# Patient Record
Sex: MALE | Race: WHITE | NOT HISPANIC OR LATINO | Employment: OTHER | URBAN - METROPOLITAN AREA
[De-identification: names, ages, dates, MRNs, and addresses within clinical notes are randomized per-mention and may not be internally consistent; named-entity substitution may affect disease eponyms.]

---

## 2017-01-05 ENCOUNTER — OFFICE VISIT (OUTPATIENT)
Dept: FAMILY MEDICINE | Facility: CLINIC | Age: 82
End: 2017-01-05

## 2017-01-05 VITALS
SYSTOLIC BLOOD PRESSURE: 131 MMHG | BODY MASS INDEX: 29.36 KG/M2 | TEMPERATURE: 97.5 F | HEART RATE: 91 BPM | DIASTOLIC BLOOD PRESSURE: 70 MMHG | HEIGHT: 72 IN | WEIGHT: 216.8 LBS | OXYGEN SATURATION: 98 %

## 2017-01-05 DIAGNOSIS — R15.9 FECAL INCONTINENCE: Primary | ICD-10-CM

## 2017-01-05 DIAGNOSIS — Z13.9 SCREENING: ICD-10-CM

## 2017-01-05 DIAGNOSIS — I10 ESSENTIAL HYPERTENSION: ICD-10-CM

## 2017-01-05 DIAGNOSIS — E11.21 TYPE 2 DIABETES MELLITUS WITH DIABETIC NEPHROPATHY, WITHOUT LONG-TERM CURRENT USE OF INSULIN (H): ICD-10-CM

## 2017-01-05 DIAGNOSIS — E78.5 HYPERLIPIDEMIA LDL GOAL <100: ICD-10-CM

## 2017-01-05 DIAGNOSIS — R19.7 DIARRHEA, UNSPECIFIED TYPE: ICD-10-CM

## 2017-01-05 LAB
% GRANULOCYTES: 75.7 %G (ref 40–75)
ANION GAP SERPL CALCULATED.3IONS-SCNC: 11 MMOL/L (ref 5–18)
BUN SERPL-MCNC: 23 MG/DL (ref 8–28)
CALCIUM SERPL-MCNC: 9.6 MG/DL (ref 8.5–10.5)
CHLORIDE SERPL-SCNC: 109 MMOL/L (ref 98–107)
CHOLEST SERPL-MCNC: 122 MG/DL
CO2 SERPL-SCNC: 17 MMOL/L (ref 22–31)
CREAT SERPL-MCNC: 1.31 MG/DL (ref 0.7–1.3)
CREAT UR-MCNC: 192.5 MG/DL
FASTING?: ABNORMAL
GLUCOSE SERPL-MCNC: 267 MG/DL (ref 70–125)
GRANULOCYTES #: 5.7 K/UL (ref 1.6–8.3)
HBA1C MFR BLD: 11.6 % (ref 4.1–5.7)
HCT VFR BLD AUTO: 42.6 % (ref 40–53)
HDLC SERPL-MCNC: 33 MG/DL
HEMOGLOBIN: 13.4 G/DL (ref 13.3–17.7)
LDLC SERPL CALC-MCNC: 64 MG/DL
LYMPHOCYTES # BLD AUTO: 1.4 K/UL (ref 0.8–5.3)
LYMPHOCYTES NFR BLD AUTO: 19.1 %L (ref 20–48)
MCH RBC QN AUTO: 29.5 PG (ref 26.5–35)
MCHC RBC AUTO-ENTMCNC: 31.5 G/DL (ref 32–36)
MCV RBC AUTO: 93.8 FL (ref 78–100)
MICROALBUMIN UR-MCNC: 2.82 MG/DL (ref 0–1.99)
MICROALBUMIN/CREAT UR: 14.6 MG/G
MID #: 0.4 K/UL (ref 0–2.2)
MID %: 5.2 %M (ref 0–20)
PLATELET # BLD AUTO: 250 K/UL (ref 150–450)
POTASSIUM SERPL-SCNC: 5 MMOL/L (ref 3.5–5)
RBC # BLD AUTO: 4.54 M/UL (ref 4.4–5.9)
SODIUM SERPL-SCNC: 137 MMOL/L (ref 136–145)
TRIGL SERPL-MCNC: 125 MG/DL
WBC # BLD AUTO: 7.5 K/UL (ref 4–11)

## 2017-01-05 RX ORDER — DIPHENOXYLATE HCL/ATROPINE 2.5-.025MG
1 TABLET ORAL 4 TIMES DAILY PRN
Qty: 30 TABLET | Refills: 0 | Status: SHIPPED | OUTPATIENT
Start: 2017-01-05 | End: 2017-02-02

## 2017-01-05 NOTE — PROGRESS NOTES
HPI:       Elie Martinez is a 84 year old  male who presents for diarrhea for five days  Has had intermittent problems with incontinence. Some urgency, but has occasionally lost stool, se isolid, without sensation.  Consistent with autonomic neuropathy.  No recent fever, no other symptoms. Wife has recently been hospitalized for CVA, and patient has had some change in diet and some additional stress.    Has not been checked for diabetes since last February.   Pt thinks there has been little change in diabetes control.   No recent exposures to infectious etiologies known.   Loosing stool about three times per day.  Perhaps better in the last two days.   No vomiting. No GI upset. No change in medication. No change in metformin              PMHX:   Current Medications:   Current Outpatient Prescriptions   Medication Sig Dispense Refill     diphenoxylate-atropine (LOMOTIL) 2.5-0.025 MG per tablet Take 1 tablet by mouth 4 times daily as needed for diarrhea 30 tablet 0     glipiZIDE (GLUCOTROL) 5 MG tablet Take 1 tablet (5 mg) by mouth daily (with breakfast) 180 tablet 1     clotrimazole-betamethasone (LOTRISONE) cream Apply topically 2 times daily 45 g 2     ONE TOUCH LANCETS MISC Use to test blood sugars 3-4 times daily or as directed. (Please dispense LifeScan or preferred brand by insurance) 100 each 11     blood glucose monitoring (ONE TOUCH TEST STRIPS) test strip Use to test blood sugar 4 times daily or as directed. (Please dispense LifeScan or preferred brand by insurance) 1 Box 11     Blood Glucose Monitoring Suppl (ONE TOUCH BASIC SYSTEM) W/DEVICE KIT Use to test blood sugar 3-4 times daily or as directed. (Please dispense LifeScan or preferred brand by insurance) 1 kit 11     blood glucose monitoring (FREESTYLE INSULINX) test strip Use to test blood sugar 4 times daily or as directed. 1 Box 3     blood glucose monitoring (FREESTYLE FREEDOM LITE) meter device kit Use to test blood sugars 3-4 times daily  or as directed. 1 kit 0     blood glucose monitoring (FREESTYLE LITE) test strip Use to test blood sugars 3-4 times daily or as directed. 1 Box 11     metFORMIN (GLUCOPHAGE-XR) 500 MG 24 hr tablet Take 2 tablets (1,000 mg) by mouth 2 times daily (with meals) 360 tablet 3     atorvastatin (LIPITOR) 40 MG tablet Take 1 tablet (40 mg) by mouth daily 90 tablet 3     blood glucose monitoring (FREESTYLE INSULINX SYSTEM) meter device kit Use to test blood sugar 4 times daily or as directed. 1 kit 0     lisinopril (PRINIVIL,ZESTRIL) 10 MG tablet Take 1 tablet (10 mg) by mouth daily 90 tablet 3     aspirin 325 MG tablet Take 1 tablet (325 mg) by mouth daily 90 tablet 3       Existing Problems  Patient Active Problem List   Diagnosis     DM2 (diabetes mellitus, type 2) (H)     Cerebral vascular disease     S/P carotid endarterectomy     Hypertension     Prostate cancer (H)     Hyperlipidemia LDL goal <100     Chronic renal disease, stage II       Allergies:  No Known Allergies    Previous labs:  Lab Results   Component Value Date    HGB 13.2* 02/04/2016    HCT 42.1 02/04/2016    CHOL 135.0 12/21/2015    TRIG 181.0* 12/21/2015    HDL 38.0* 12/21/2015     04/13/2016    BUN 22 04/13/2016    CO2 26.0 03/16/2016    PSA 0.8 09/14/2016    PSA 0.8 09/14/2016               Review of Systems:    CONSTITUTIONAL: no fatigue, no unexpected change in weight  SKIN: no worrisome rashes, no worrisome moles, no worrisome lesions  EYES: no acute vision problems or changes  ENT: no ear problems, no mouth problems, no throat problems  RESP: no significant cough, no shortness of breath  CV: no chest pain, no palpitations, no new or worsening peripheral edema  GI: no nausea, no vomiting, no constipation, no diarrhea          Physical Exam:     Filed Vitals:    01/05/17 1015   BP: 131/70   Pulse: 91   Temp: 97.5  F (36.4  C)   TempSrc: Oral   Height: 6' (182.9 cm)   Weight: 216 lb 12.8 oz (98.34 kg)   SpO2: 98%     Body mass index is 29.4  kg/(m^2).    GENERAL:alert, well hydrated, no distress  EYES: Eyes grossly normal to inspection, extraocular movements - intact, and PERRL  HENT: ear canals- normal; TMs- normal; Nose- normal; Mouth- no ulcers, no lesions  NECK: no tenderness, no adenopathy, no asymmetry, no masses, no stiffness;  RESP: lungs clear to auscultation - no rales, no rhonchi, no wheezes  CV: regular rates and rhythm, normal S1 S2, no S3 or S4 and no murmur, no click or rub -  ABDOMEN: soft, no tenderness, no  hepatosplenomegaly, no masses, normal bowel sounds             Labs and Procedures     Office Visit on 09/14/2016   Component Date Value Ref Range Status     PSA 09/14/2016 0.8  0.0 - 6.5 ng/mL Final     PSA 09/14/2016 0.8  0.0 - 6.5 ng/mL Final              Assessment and Plan     1.Stool incontinence - consider infectious versus onset of autonomic neuropathy (diabetes), versus functional changes.  Will provide lomotil for short term control. Will also increase fiber to two teaspoons twice per day.  2. Stool sample for culture and OP.  3. A1c,CBC, BMP.   4. Hyperlipidemia - will recheck lipids at this time.   5. Recheck in one week.         Options for treatment and follow-up care were reviewed with the patient and/or guardian. Elie Martinez and/or guardian engaged in the decision making process and verbalized understanding of the options discussed and agreed with the final plan.    Jairo Hammer MD

## 2017-01-05 NOTE — PATIENT INSTRUCTIONS
Your medication list is printed, please keep this with you, it is helpful to bring this current list to any other medical appointments, the emergency room or hospital.    If you had lab testing today and your results are reassuring or normal they will be be mailed to you within 7 days.     If the lab tests need quick action we will call you with the results.   The phone number we will call with results is # [phone] (home) . If this is not the best number please call our clinic and change the number.    If you need any refills please call your pharmacy and they will contact us.    If you have any further concerns or wish to schedule another appointment you must call our office during normal business hours  [phone] (8-5:00 M-F)  If you have urgent medical questions that cannot wait  you may also call [phone] at any time of day.  If you have a medical emergency please call [phone].    Thank you for coming to [facility].

## 2017-01-05 NOTE — Clinical Note
January 11, 2017      Elie Martinez  [address]   [place] [facility] [address]        Dear Elie,    Your diabetes is out of control. Your doctor will talk with you more about this at your next visit.    Please see below for your test results.    Resulted Orders   Hemoglobin A1c ([facility])   Result Value Ref Range    Hemoglobin A1C 11.6 (H) 4.1 - 5.7 %   CBC with Diff Plt ([facility])   Result Value Ref Range    WBC 7.5 4.0 - 11.0 K/uL    Lymphocytes # 1.4 0.8 - 5.3 K/uL    % Lymphocytes 19.1 (L) 20.0 - 48.0 %L    Mid # 0.4 0.0 - 2.2 K/uL    Mid % 5.2 0.0 - 20.0 %M    GRANULOCYTES # 5.7 1.6 - 8.3 K/uL    % Granulocytes 75.7 (H) 40.0 - 75.0 %G    RBC 4.54 4.40 - 5.90 M/uL    Hemoglobin 13.4 13.3 - 17.7 g/dL    Hematocrit 42.6 40.0 - 53.0 %    MCV 93.8 78.0 - 100.0 fL    MCH 29.5 26.5 - 35.0 pg    MCHC 31.5 (L) 32.0 - 36.0 g/dL    Platelets 250.0 150.0 - 450.0 K/uL   Microalbumin Creatinine Ratio Random Ur ([facility])   Result Value Ref Range    Microalbumin, Urine 2.82 (H) 0.00 - 1.99 mg/dL    Creatinine, Urine 192.5 mg/dL    Albumin Urine mg/g Cr 14.6 <=19.9 mg/g    Narrative    Test performed by:  [facility] [address] [place] [address]  Microalbumin, Random Urine  <2.0 mg/dL . . . . . . . . Normal  3.0-30.0 mg/dL . . . . . . Microalbuminuria  >30.0 mg/dL . . . . . .  . Clinical Proteinuria  Microalbumin/Creatinine Ratio, Random Urine  <20 mg/g . . . . .. . . . Normal   mg/g . . . . . . . Microalbuminuria  >300 mg/g . . . . . . . . Clinical Proteinuria   Basic Metabolic Profile ([facility])   Result Value Ref Range    Sodium 137 136 - 145 mmol/L    Potassium 5.0 3.5 - 5.0 mmol/L    Chloride 109 (H) 98 - 107 mmol/L    CO2, Total 17 (L) 22 - 31 mmol/L    Anion Gap 11 5 - 18 mmol/L    Glucose 267 (H) 70 - 125 mg/dL    Calcium 9.6 8.5 - 10.5 mg/dL    Urea Nitrogen 23 8 - 28 mg/dL    Creatinine 1.31 (H) 0.70 - 1.30 mg/dL    GFR Estimate If Black >60 >60 mL/min/1.73m2    GFR Estimate 52 (L) >60  mL/min/1.73m2    Narrative    Test performed by:  [facility]  [address] [place] [address]  Fasting Glucose reference range is 70-99 mg/dL per  American Diabetes Association (ADA) guidelines.   Lipid Profile ([facility])   Result Value Ref Range    Triglycerides 125 <=149 mg/dL    Cholesterol 122 <=199 mg/dL    LDL Cholesterol Calculated 64 <=129 mg/dL    HDL Cholesterol 33 (L) >=40 mg/dL    Fasting? Unknown     Narrative    Test performed by:  [facility] [address] [place] [address]       If you have any questions, please call the clinic to make an appointment.    Sincerely,    Jairo Hammer MD

## 2017-01-06 DIAGNOSIS — R19.7 DIARRHEA, UNSPECIFIED TYPE: ICD-10-CM

## 2017-01-06 NOTE — Clinical Note
January 11, 2017      Elie Martinez  [address]   [place] [facility] [address]        Dear Elie,    No stool infections seen.    Please see below for your test results.    Resulted Orders   Ova And Parasite - Stool ([facility])   Result Value Ref Range    Ova and Parasite No ova or parasites seen No Ova or Parasites seen    Narrative    Test performed by:  [facility] [address] [place] [address]   Enterohaemorrhagic E. Coli ([facility])   Result Value Ref Range    Shiga Toxin 1 Negative Negative    Shiga Toxin 2 Negative Negative    Narrative    Test performed by:  [facility] [address] [place] [address]   Culture, Stool ([facility])   Result Value Ref Range    Culture SEE RESULTS BELOW       Comment:      CULTURE, STOOL   CULTURE RESULTS:    No Salmonella, Shigella, Yersinia, or Campylobacter.      Narrative    Test performed by:  [facility] [address] [place] [address]  All specimens submitted for routine culture tested for Salmonella, Shigella,   Yersinia, Campylobacter, and if applicable, Shiga toxin 1 and 2 producing   Enterohemorrhagic E. coli.       If you have any questions, please call the clinic to make an appointment.    Sincerely,    [facility]

## 2017-01-07 LAB
SHIGA TOXIN 1: NEGATIVE
SHIGA TOXIN 2: NEGATIVE

## 2017-01-09 LAB
CULTURE: NORMAL
O+P SPEC MICRO: NORMAL

## 2017-01-09 NOTE — PROGRESS NOTES
Quick Note:    Please call patient and send results letter.   Your diabetes is out of control. Your doctor will talk with you more about this at your next visit.  ______

## 2017-02-02 ENCOUNTER — OFFICE VISIT (OUTPATIENT)
Dept: FAMILY MEDICINE | Facility: CLINIC | Age: 82
End: 2017-02-02

## 2017-02-02 VITALS
BODY MASS INDEX: 29.56 KG/M2 | OXYGEN SATURATION: 98 % | HEART RATE: 92 BPM | TEMPERATURE: 97.3 F | WEIGHT: 218 LBS | SYSTOLIC BLOOD PRESSURE: 95 MMHG | DIASTOLIC BLOOD PRESSURE: 62 MMHG

## 2017-02-02 DIAGNOSIS — R19.7 DIARRHEA, UNSPECIFIED TYPE: ICD-10-CM

## 2017-02-02 DIAGNOSIS — I67.9 CEREBRAL VASCULAR DISEASE: ICD-10-CM

## 2017-02-02 DIAGNOSIS — E11.65 TYPE 2 DIABETES MELLITUS WITH HYPERGLYCEMIA, UNSPECIFIED LONG TERM INSULIN USE STATUS: ICD-10-CM

## 2017-02-02 DIAGNOSIS — E11.65 TYPE 2 DIABETES MELLITUS WITH HYPERGLYCEMIA, WITHOUT LONG-TERM CURRENT USE OF INSULIN (H): Primary | ICD-10-CM

## 2017-02-02 DIAGNOSIS — I10 BENIGN ESSENTIAL HYPERTENSION: ICD-10-CM

## 2017-02-02 RX ORDER — METFORMIN HCL 500 MG
500 TABLET, EXTENDED RELEASE 24 HR ORAL 2 TIMES DAILY WITH MEALS
Qty: 360 TABLET | Refills: 3
Start: 2017-02-02 | End: 2017-07-31

## 2017-02-02 RX ORDER — DIPHENOXYLATE HCL/ATROPINE 2.5-.025MG
1 TABLET ORAL 4 TIMES DAILY PRN
Qty: 30 TABLET | Refills: 0 | Status: SHIPPED | OUTPATIENT
Start: 2017-02-02 | End: 2017-02-16

## 2017-02-02 RX ORDER — GLIPIZIDE 5 MG/1
5 TABLET ORAL
Qty: 180 TABLET | Refills: 1 | Status: SHIPPED | OUTPATIENT
Start: 2017-02-02 | End: 2017-10-09

## 2017-02-02 NOTE — PROGRESS NOTES
"SUBJECTIVE:  This is an 84-year-old male with type 2 diabetes, cerebrovascular disease status post carotid endarterectomy, hypertension, stage III chronic kidney disease and prostate cancer status post treatment.  He presents today with a chief complaint of ongoing loose stools and intermittent stool incontinence.  He gets up in the morning, takes his morning medications of lisinopril, aspirin, metformin 1000 mg (extended release) and 5 mg of glipizide.  Between 10:00 a.m. and noon he will have a small amount of loose runny stools and he is sometimes incontinent of stool.  He has had a normal formed stool, usually shortly after breakfast.  He rarely will have loose runny stools or stool incontinence in the early afternoon.  For the remainder of the day he does not have any difficulty with loose stools or runny stool.  He has formed stool every morning.  He denies any abdominal pain, cramping, blood in stool or black stool.  This has bothered him for years.  He cannot recall it ever being better in the past.  When I look at my records it appears that he had resolution of his loose stools when he switched from shorter acting metformin to long-acting metformin.  He does not have blood glucose readings with him today.  He had an A1c done earlier this month was 11.3, up from 7.3 back in March.  He denies any changes in his medication but admits he occasionally misses his afternoon medication doses.  His wife has suffered a stroke about 2 months ago and has been hospitalized and in transitional care.  This has been hard for him to care for his own medications while caring for her.  They are moving today.  He uses a pillbox for his medications and feels at the end of the week his pillbox is always empty.  He was able to name the medication metformin and its appropriate dose and then knew he took \"four other medications,\" but did not know their names or other dose.   Next, as far as his hypertension he does not check his " blood pressure at home.  No dizziness, lightheadedness or orthostatic symptoms.   Next, as far as his wife's health they are moving, but I do not know the extent of her change in independent living.   REVIEW OF SYSTEMS:  Formed stools in the morning loose stools in late morning as outlined above.  No areas of pain.  The remainder of the review of systems as outlined above.   OBJECTIVE:   VITAL SIGNS:  As above with his blood pressure relatively low.   GENERAL:  He is alert, no distress, relates his own history, although not extensive detail, medication naming is outlined in the HPI.   HEENT:  Head is normocephalic and atraumatic.  Eyes, sclerae are nonicteric, noninjected.  Moist mucous membranes.  Tympanic membranes are white with normal bony and light landmarks.   NECK:  Supple without lymphadenopathy.   CARDIOVASCULAR:  Regular rate and rhythm without murmur.   LUNGS:  Clear with fair air exchange bilaterally.   ABDOMEN:  Soft and nontender.   ASSESSMENT AND PLAN:   1.  Chronic loose stool:  Reviewing my records it appears he had some improvement with switching from short-acting metformin long-acting metformin.  I will try reducing his extended release metformin dose from 1000 mg twice a day to 500 mg twice a day and ensure this happens with meals.  I have also recommended a home care nurse to do weekly medication monitoring.  My staff will help arrange a nursing evaluation and ongoing observation.  He did recently have stool cultures, stool studies which were negative and infectious etiology seems likely.  Without pain ischemic bowel seems unlikely.  He is not on other likely medications.  A neuropathy is a possibility, although his normal stooling and sensation every morning makes this less likely as well.   2.  Uncontrolled type 2 diabetes:  His A1c raised from 7.3-11.3 over the past 7 months.  Medication adherence is a possibility, particularly in light of some memory deficit.  I have increased his glipizide  to 5 mg twice daily with meals.  Her metformin reduction as outlined above.  He will see me in 3 weeks with a glucometer.  Home nursing assessment.   3.  Stage 3 chronic kidney disease:  Basic metabolic profile on  1/5 shows stable GFR in the 50 range,  repeat no later than 2017.   4.  Hypertension:  His blood pressure is relatively low today, I have reduced his lisinopril 5 mg each day.   Goal blood pressure of around 140/ 90 in light of his cerebrovascular disease.   5.  History of prostate cancer:  He last saw Urology in November.  They plan to follow up PSA last at 0.8.

## 2017-02-02 NOTE — MR AVS SNAPSHOT
After Visit Summary   2/2/2017    Elie Martinez    MRN: [id number]           Patient Information     Date Of Birth          9/8/1932        Visit Information        Provider Department      2/2/2017 1:00 PM Jhon Gutierrez MD [facility]        Today's Diagnoses     Type 2 diabetes mellitus with hyperglycemia, without long-term current use of insulin (H)    -  1     Type 2 diabetes mellitus with hyperglycemia, unspecified long term insulin use status (H)         Diarrhea, unspecified type           Care Instructions    - Reduce lisinopril from 10 mg (1 tablet) to 5 mg ( 0.5 tablet) daily for blood pressure.  - Reduce metformin to 1 tablet twice daily instead of 2 tablets twice daily.  - Increase glipizide to 1 tablet with breakfast and 1 tablet with your evening meal.  - Return to see Dr Gutierrez in 2-3 weeks with your glucometer.    Your medication list is printed, please keep this with you, it is helpful to bring this current list to any other medical appointments, the emergency room or hospital.    If you had lab testing today and your results are reassuring or normal they will be be mailed to you within 7 days.     If the lab tests need quick action we will call you with the results.   The phone number we will call with results is # [phone] (home) . If this is not the best number please call our clinic and change the number.    If you need any refills please call your pharmacy and they will contact us.    If you have any further concerns or wish to schedule another appointment you must call our office during normal business hours  [phone] (8-5:00 M-F)  If you have urgent medical questions that cannot wait  you may also call [phone] at any time of day.  If you have a medical emergency please call [phone].    Thank you for coming to [facility].          Follow-ups after your visit        Who to contact     Please call your clinic at [phone] to:    Ask questions about  your health    Make or cancel appointments    Discuss your medicines    Learn about your test results    Speak to your doctor   If you have compliments or concerns about an experience at your clinic, or if you wish to file a complaint, please contact [facility] Patient Relations at [phone] or email us at [email or web address]         Additional Information About Your Visit        OneMedNethart Information     TopPatcht is an electronic gateway that provides easy, online access to your medical records. With Whyteboard, you can request a clinic appointment, read your test results, renew a prescription or communicate with your care team.     To sign up for Whyteboard visit the website at [email or web address] ([email or web address]   You will be asked to enter the access code listed below, as well as some personal information. Please follow the directions to create your username and password.     Your access code is: [email or web address]  Expires: 5/3/2017  1:35 PM     Your access code will  in 90 days. If you need help or a new code, please contact your [facility] or call [phone] for assistance.        Care EveryWhere ID     This is your Care EveryWhere ID. This could be used by other organizations to access your [place] medical records  [id number]        Your Vitals Were     Pulse Temperature Pulse Oximetry             92 97.3  F (36.3  C) (Tympanic) 98%          Blood Pressure from Last 3 Encounters:   17 95/62   17 131/70   16 138/73    Weight from Last 3 Encounters:   17 218 lb (98.884 kg)   17 216 lb 12.8 oz (98.34 kg)   16 219 lb (99.338 kg)              Today, you had the following     No orders found for display         Today's Medication Changes          These changes are accurate as of: 17  1:35 PM.  If you have any questions, ask your nurse or doctor.               These medicines have changed or have updated  prescriptions.        Dose/Directions    glipiZIDE 5 MG tablet   Commonly known as:  GLUCOTROL   This may have changed:  when to take this   Used for:  Type 2 diabetes mellitus with hyperglycemia, unspecified long term insulin use status (H)   Changed by:  Jhon Gutierrez MD        Dose:  5 mg   Take 1 tablet (5 mg) by mouth 2 times daily (before meals)   Quantity:  180 tablet   Refills:  1       metFORMIN 500 MG 24 hr tablet   Commonly known as:  GLUCOPHAGE-XR   This may have changed:  how much to take   Used for:  Type 2 diabetes mellitus with hyperglycemia, without long-term current use of insulin (H)   Changed by:  Jhon Gutierrez MD        Dose:  500 mg   Take 1 tablet (500 mg) by mouth 2 times daily (with meals)   Quantity:  360 tablet   Refills:  3            Where to get your medicines      These medications were sent to [id number] [place], [place] - [address] [place] [address]     Phone:  [phone]    - glipiZIDE 5 MG tablet      Some of these will need a paper prescription and others can be bought over the counter.  Ask your nurse if you have questions.     Bring a paper prescription for each of these medications    - diphenoxylate-atropine 2.5-0.025 MG per tablet    You don't need a prescription for these medications    - metFORMIN 500 MG 24 hr tablet             Primary Care Provider Office Phone # Fax #    Jhon Gutierrez [phone]       [facility] [address] [place] [address]        Thank you!     Thank you for choosing [facility]  for your care. Our goal is always to provide you with excellent care. Hearing back from our patients is one way we can continue to improve our services. Please take a few minutes to complete the written survey that you may receive in the mail after your visit with us. Thank you!             Your Updated Medication List - Protect others around you: Learn how to safely use, store and throw away your  medicines at www.disposemymeds.org.          This list is accurate as of: 2/2/17  1:35 PM.  Always use your most recent med list.                   Brand Name Dispense Instructions for use    aspirin 325 MG tablet     90 tablet    Take 1 tablet (325 mg) by mouth daily       atorvastatin 40 MG tablet    LIPITOR    90 tablet    Take 1 tablet (40 mg) by mouth daily       * blood glucose monitoring meter device kit     1 kit    Use to test blood sugar 4 times daily or as directed.       * blood glucose monitoring meter device kit     1 kit    Use to test blood sugars 3-4 times daily or as directed.       * ONE TOUCH BASIC SYSTEM W/DEVICE Kit     1 kit    Use to test blood sugar 3-4 times daily or as directed. (Please dispense LifeScan or preferred brand by insurance)       * blood glucose monitoring test strip    FREESTYLE INSULINX    1 Box    Use to test blood sugar 4 times daily or as directed.       * blood glucose monitoring test strip    FREESTYLE LITE    1 Box    Use to test blood sugars 3-4 times daily or as directed.       * blood glucose monitoring test strip    ONE TOUCH TEST STRIPS    1 Box    Use to test blood sugar 4 times daily or as directed. (Please dispense LifeScan or preferred brand by insurance)       clotrimazole-betamethasone cream    LOTRISONE    45 g    Apply topically 2 times daily       diphenoxylate-atropine 2.5-0.025 MG per tablet    LOMOTIL    30 tablet    Take 1 tablet by mouth 4 times daily as needed for diarrhea       glipiZIDE 5 MG tablet    GLUCOTROL    180 tablet    Take 1 tablet (5 mg) by mouth 2 times daily (before meals)       lisinopril 10 MG tablet    PRINIVIL/ZESTRIL    90 tablet    Take 1 tablet (10 mg) by mouth daily       metFORMIN 500 MG 24 hr tablet    GLUCOPHAGE-XR    360 tablet    Take 1 tablet (500 mg) by mouth 2 times daily (with meals)       ONE TOUCH LANCETS Misc     100 each    Use to test blood sugars 3-4 times daily or as directed. (Please dispense InCommcan or  preferred brand by insurance)       * Notice:  This list has 6 medication(s) that are the same as other medications prescribed for you. Read the directions carefully, and ask your doctor or other care provider to review them with you.

## 2017-02-02 NOTE — PATIENT INSTRUCTIONS
- Reduce lisinopril from 10 mg (1 tablet) to 5 mg ( 0.5 tablet) daily for blood pressure.  - Reduce metformin to 1 tablet twice daily instead of 2 tablets twice daily.  - Increase glipizide to 1 tablet with breakfast and 1 tablet with your evening meal.  - Return to see Dr Gutierrez in 2-3 weeks with your glucometer.    Your medication list is printed, please keep this with you, it is helpful to bring this current list to any other medical appointments, the emergency room or hospital.    If you had lab testing today and your results are reassuring or normal they will be be mailed to you within 7 days.     If the lab tests need quick action we will call you with the results.   The phone number we will call with results is # [phone] (home) . If this is not the best number please call our clinic and change the number.    If you need any refills please call your pharmacy and they will contact us.    If you have any further concerns or wish to schedule another appointment you must call our office during normal business hours  [phone] (8-5:00 M-F)  If you have urgent medical questions that cannot wait  you may also call [phone] at any time of day.  If you have a medical emergency please call [phone].    Thank you for coming to [facility].

## 2017-02-13 DIAGNOSIS — E11.9 TYPE 2 DIABETES MELLITUS WITHOUT COMPLICATION, WITHOUT LONG-TERM CURRENT USE OF INSULIN (H): ICD-10-CM

## 2017-02-13 NOTE — TELEPHONE ENCOUNTER
[facility] phone call message- patient requesting a refill:    Full Medication Name: Freestyle light test strips    Dose:     Pharmacy confirmed as   [id number] - [place], [place] - [address]  AT [facility] [address]  [address] [place] [id number]  Phone: [phone] Fax: [phone]    [facility] - [place], [place] - [address] [place] [address]  Phone: [phone] Fax: [phone]  : No: To the [employer] above.    Additional Comments:      OK to leave a message on voice mail? Yes    Primary language: English      needed? No    Call taken on February 13, 2017 at 2:59 PM by Cherie Linares

## 2017-02-16 ENCOUNTER — OFFICE VISIT (OUTPATIENT)
Dept: FAMILY MEDICINE | Facility: CLINIC | Age: 82
End: 2017-02-16

## 2017-02-16 VITALS
HEIGHT: 72 IN | OXYGEN SATURATION: 95 % | RESPIRATION RATE: 16 BRPM | TEMPERATURE: 98.3 F | WEIGHT: 218.6 LBS | DIASTOLIC BLOOD PRESSURE: 65 MMHG | BODY MASS INDEX: 29.61 KG/M2 | HEART RATE: 71 BPM | SYSTOLIC BLOOD PRESSURE: 151 MMHG

## 2017-02-16 DIAGNOSIS — I10 BENIGN ESSENTIAL HYPERTENSION: ICD-10-CM

## 2017-02-16 DIAGNOSIS — E11.21 TYPE 2 DIABETES MELLITUS WITH DIABETIC NEPHROPATHY, WITHOUT LONG-TERM CURRENT USE OF INSULIN (H): ICD-10-CM

## 2017-02-16 DIAGNOSIS — R19.5 LOOSE STOOLS: Primary | ICD-10-CM

## 2017-02-16 NOTE — PROGRESS NOTES
SUBJECTIVE:  This is an 84-year-old male with type 2 diabetes, cerebrovascular disease status post carotid endarterectomy, hypertension, stage III chronic kidney disease and prostate cancer status post treatment.  I saw him 2 weeks ago with loose stools with intermittent stool incontinence.  I reduced his metformin from 1000 mg twice daily to 500 mg twice daily.  Over the last 7 days his stools have been formed and back to normal.  He has not been using any Lomotil or other over-the-counter supplements.  He is happy with the change.  Also, at the last appointment due to an A1c greater than 11 I increased his glipizide to 5 mg twice daily with meals.  He admitted that he was having difficulty  remembering afternoon doses of medicines and was likely missing some afternoon doses.  He is using a pillbox and recently completed a moved to a new home so he thinks getting afternoon dose in regularly should be easier from now on.  I also reduced his lisinopril from 10 mg to 5 mg due to relative low blood pressure.   REVIEW OF SYSTEMS:  He has not checked his blood sugars over the last 2 weeks as he has let his wife use all of his testing supplies as she recovers from a recent stroke in the transitional care center.  He has been feeling some stress around her recent stroke but feels he is coping better recently.  He has moved into a new home and the move is now complete.He has not had any abdominal pain or new urinary symptoms.    The remainder of the review of systems is outlined above.   OBJECTIVE:   VITAL SIGNS:  As above with his blood pressure now above goal.   GENERAL:  He is alert, no distress, relates his own history.  He is able to verify all 3 medication changes we made 2 weeks ago.   HEENT:  Head is normocephalic and atraumatic.  Eyes, sclerae are nonicteric, noninjected.  Moist mucous membranes.   NECK:  Supple without lymphadenopathy.   CARDIOVASCULAR:  Regular rate and rhythm without murmur.   LUNGS:  Clear with  fair air exchange bilaterally.   ABDOMEN:  Soft and nontender.   EXTREMITIES:  With trace bilateral edema.   ASSESSMENT AND PLAN:   1.  Loose stool, most likely due to metformin.  His loose stools seemed to resolve with a lower dose of metformin.  He has undergone stool cultures which were negative for infectious etiology.    Will continue the lower dose metformin extended release 500 mg twice daily for now.  Monitor for recurrence.    2.  Uncontrolled type 2 diabetes:  His A1c of 7.3, up to 11.3 over the past 7 months.  Afternoon medication adherence is a likely contributor and some memory deficit is likely contributing to medication adherence.  He is using a pillbox regularly, we will aim for an A1c of 8.5%.  I have referred him for home nursing assessment.  He will begin checking his blood glucoses at least once daily at rotating times,  And in his after after-visit summary he has call parameters for glucoses out of range.  IF his glucoses are within range, he will follow up in April 2017 for an A1c and a basic metabolic profile.  He will return within a couple of weeks if blood glucoses are regularly above the listed range or any single blood sugar less than 70.   3.  Hypertension:  His blood pressure is back above goal now.  I have asked him to resume his typical lisinopril 10 mg dose.  Basic metabolic profile plan for 04/2017.  His goal blood pressure is around 140/90 in light of his cerebrovascular disease.   4.  History of prostate cancer:  He saw Urology in 11/2012 and they are following PSAs.  His last PSA was 0.8.

## 2017-02-16 NOTE — MR AVS SNAPSHOT
After Visit Summary   2/16/2017    Elie Martinez    MRN: [id number]           Patient Information     Date Of Birth          9/8/1932        Visit Information        Provider Department      2/16/2017 10:00 AM Jhon Gutierrez MD [facility]        Care Instructions    Continue Metformin 500 mg twice daily with meals along with Glipizide 5 mg twice daily with meals.    Increase your Lisinopril, blood pressure medication back up to 10 mg daily.    Return to clinic in April 2017 for a hemoglobin A1C and kidney blood test.    Please call earlier if you are regularly getting fasting blood sugars greater than 140 or greater than 250 after you eat.      Your medication list is printed, please keep this with you, it is helpful to bring this current list to any other medical appointments, the emergency room or hospital.    If you had lab testing today and your results are reassuring or normal they will be be mailed to you within 7 days.     If the lab tests need quick action we will call you with the results.   The phone number we will call with results is # [phone] (home) . If this is not the best number please call our clinic and change the number.    If you need any refills please call your pharmacy and they will contact us.    If you have any further concerns or wish to schedule another appointment you must call our office during normal business hours  [phone] (8-5:00 M-F)  If you have urgent medical questions that cannot wait  you may also call [phone] at any time of day.  If you have a medical emergency please call [phone].    Thank you for coming to [facility].          Follow-ups after your visit        Who to contact     Please call your clinic at [phone] to:    Ask questions about your health    Make or cancel appointments    Discuss your medicines    Learn about your test results    Speak to your doctor   If you have compliments or concerns about an experience at  your clinic, or if you wish to file a complaint, please contact [facility] Patient Relations at [phone] or email us at [email or web address]         Additional Information About Your Visit        [facility] [employer] in Practice Information     [employer] is an electronic gateway that provides easy, online access to your medical records. With [employer], you can request a clinic appointment, read your test results, renew a prescription or communicate with your care team.     To sign up for [employer] visit the website at [email or web address]   You will be asked to enter the access code listed below, as well as some personal information. Please follow the directions to create your username and password.     Your access code is: [email or web address]  Expires: 5/3/2017  1:35 PM     Your access code will  in 90 days. If you need help or a new code, please contact your [facility] or call [phone] for assistance.        Care EveryWhere ID     This is your Care EveryWhere ID. This could be used by other organizations to access your [facility] medical records  [id number]        Your Vitals Were     Pulse Temperature Respirations Height Pulse Oximetry BMI (Body Mass Index)    71 98.3  F (36.8  C) (Oral) 16 6' (182.9 cm) 95% 29.65 kg/m2       Blood Pressure from Last 3 Encounters:   17 151/65   17 95/62   17 131/70    Weight from Last 3 Encounters:   17 218 lb 9.6 oz (99.2 kg)   17 218 lb (98.9 kg)   17 216 lb 12.8 oz (98.3 kg)              Today, you had the following     No orders found for display         Today's Medication Changes          These changes are accurate as of: 17 10:50 AM.  If you have any questions, ask your nurse or doctor.               Stop taking these medicines if you haven't already. Please contact your care team if you have questions.     diphenoxylate-atropine 2.5-0.025 MG per tablet   Commonly known as:  LOMOTIL    Stopped by:  Jhon Gutierrez MD                    Primary Care Provider Office Phone # Fax #    Jhon Gutierrez [phone]       [facility] [address] [place] [address]        Thank you!     Thank you for choosing [facility]  for your care. Our goal is always to provide you with excellent care. Hearing back from our patients is one way we can continue to improve our services. Please take a few minutes to complete the written survey that you may receive in the mail after your visit with us. Thank you!             Your Updated Medication List - Protect others around you: Learn how to safely use, store and throw away your medicines at www.disposemymeds.org.          This list is accurate as of: 2/16/17 10:50 AM.  Always use your most recent med list.                   Brand Name Dispense Instructions for use    aspirin 325 MG tablet     90 tablet    Take 1 tablet (325 mg) by mouth daily       atorvastatin 40 MG tablet    LIPITOR    90 tablet    Take 1 tablet (40 mg) by mouth daily       * blood glucose monitoring meter device kit     1 kit    Use to test blood sugar 4 times daily or as directed.       * blood glucose monitoring meter device kit     1 kit    Use to test blood sugars 3-4 times daily or as directed.       * ONE TOUCH BASIC SYSTEM W/DEVICE Kit     1 kit    Use to test blood sugar 3-4 times daily or as directed. (Please dispense LifeScan or preferred brand by insurance)       * blood glucose monitoring test strip    FREESTYLE INSULINX    1 Box    Use to test blood sugar 4 times daily or as directed.       * blood glucose monitoring test strip    ONE TOUCH TEST STRIPS    1 Box    Use to test blood sugar 4 times daily or as directed. (Please dispense LifeScan or preferred brand by insurance)       * blood glucose monitoring test strip    FREESTYLE LITE    1 Box    Use to test blood sugars 3-4 times daily or as directed.       clotrimazole-betamethasone cream     LOTRISONE    45 g    Apply topically 2 times daily       glipiZIDE 5 MG tablet    GLUCOTROL    180 tablet    Take 1 tablet (5 mg) by mouth 2 times daily (before meals)       lisinopril 10 MG tablet    PRINIVIL/ZESTRIL    90 tablet    Take 1 tablet (10 mg) by mouth daily       metFORMIN 500 MG 24 hr tablet    GLUCOPHAGE-XR    360 tablet    Take 1 tablet (500 mg) by mouth 2 times daily (with meals)       ONE TOUCH LANCETS Misc     100 each    Use to test blood sugars 3-4 times daily or as directed. (Please dispense LifeScan or preferred brand by insurance)       * Notice:  This list has 6 medication(s) that are the same as other medications prescribed for you. Read the directions carefully, and ask your doctor or other care provider to review them with you.

## 2017-02-16 NOTE — PATIENT INSTRUCTIONS
Continue Metformin 500 mg twice daily with meals along with Glipizide 5 mg twice daily with meals.    Increase your Lisinopril, blood pressure medication back up to 10 mg daily.    Return to clinic in April 2017 for a hemoglobin A1C and kidney blood test.    Please call earlier if you are regularly getting fasting blood sugars greater than 140 or greater than 250 after you eat.      Your medication list is printed, please keep this with you, it is helpful to bring this current list to any other medical appointments, the emergency room or hospital.    If you had lab testing today and your results are reassuring or normal they will be be mailed to you within 7 days.     If the lab tests need quick action we will call you with the results.   The phone number we will call with results is # [phone] (home) . If this is not the best number please call our clinic and change the number.    If you need any refills please call your pharmacy and they will contact us.    If you have any further concerns or wish to schedule another appointment you must call our office during normal business hours  [phone] (8-5:00 M-F)  If you have urgent medical questions that cannot wait  you may also call [phone] at any time of day.  If you have a medical emergency please call [phone].    Thank you for coming to [facility].

## 2017-02-21 DIAGNOSIS — I10 BENIGN ESSENTIAL HYPERTENSION: ICD-10-CM

## 2017-02-21 DIAGNOSIS — E11.9 TYPE 2 DIABETES MELLITUS WITHOUT COMPLICATION, WITHOUT LONG-TERM CURRENT USE OF INSULIN (H): ICD-10-CM

## 2017-02-23 RX ORDER — LISINOPRIL 10 MG/1
10 TABLET ORAL DAILY
Qty: 90 TABLET | Refills: 3 | Status: SHIPPED | OUTPATIENT
Start: 2017-02-23 | End: 2018-02-20

## 2017-02-28 ENCOUNTER — TRANSFERRED RECORDS (OUTPATIENT)
Dept: HEALTH INFORMATION MANAGEMENT | Facility: CLINIC | Age: 82
End: 2017-02-28

## 2017-05-04 DIAGNOSIS — I67.9 CEREBRAL VASCULAR DISEASE: ICD-10-CM

## 2017-05-05 RX ORDER — ATORVASTATIN CALCIUM 40 MG/1
40 TABLET, FILM COATED ORAL DAILY
Qty: 90 TABLET | Refills: 3 | Status: SHIPPED | OUTPATIENT
Start: 2017-05-05 | End: 2017-10-09

## 2017-05-26 ENCOUNTER — OFFICE VISIT (OUTPATIENT)
Dept: FAMILY MEDICINE | Facility: CLINIC | Age: 82
End: 2017-05-26

## 2017-05-26 VITALS
RESPIRATION RATE: 18 BRPM | OXYGEN SATURATION: 95 % | SYSTOLIC BLOOD PRESSURE: 129 MMHG | TEMPERATURE: 98 F | BODY MASS INDEX: 28.81 KG/M2 | WEIGHT: 212.4 LBS | DIASTOLIC BLOOD PRESSURE: 76 MMHG | HEART RATE: 82 BPM

## 2017-05-26 DIAGNOSIS — R10.9 RIGHT FLANK PAIN: Primary | ICD-10-CM

## 2017-05-26 DIAGNOSIS — I10 ESSENTIAL HYPERTENSION: ICD-10-CM

## 2017-05-26 DIAGNOSIS — E11.65 TYPE 2 DIABETES MELLITUS WITH HYPERGLYCEMIA, UNSPECIFIED LONG TERM INSULIN USE STATUS: ICD-10-CM

## 2017-05-26 DIAGNOSIS — N18.2 CHRONIC RENAL DISEASE, STAGE II: ICD-10-CM

## 2017-05-26 LAB
BILIRUBIN UR: NEGATIVE
BLOOD UR: NEGATIVE
BUN SERPL-MCNC: 22 MG/DL (ref 7–30)
CALCIUM SERPL-MCNC: 10.2 MG/DL (ref 8.5–10.4)
CHLORIDE SERPLBLD-SCNC: 98 MMOL/L (ref 94–109)
CLARITY, URINE: CLEAR
CO2 SERPL-SCNC: 28 MMOL/L (ref 20–32)
COLOR UR: YELLOW
CREAT SERPL-MCNC: 1.2 MG/DL (ref 0.8–1.5)
EGFR CALCULATED (BLACK REFERENCE): 74.2 ML/MIN
EGFR CALCULATED (NON BLACK REFERENCE): 61.3 ML/MIN
GLUCOSE SERPL-MCNC: 149 MG/DL (ref 60–109)
GLUCOSE URINE: ABNORMAL
HBA1C MFR BLD: 8.5 % (ref 4.1–5.7)
KETONES UR QL: NEGATIVE
LEUKOCYTE ESTERASE UR: ABNORMAL
NITRITE UR QL STRIP: NEGATIVE
PH UR STRIP: 5 [PH] (ref 5–7)
POTASSIUM SERPL-SCNC: 4.6 MMOL/L (ref 3.4–5.3)
PROTEIN UR: NEGATIVE
SODIUM SERPL-SCNC: 141 MMOL/L (ref 133–144)
SP GR UR STRIP: 1.01
UROBILINOGEN UR STRIP-ACNC: ABNORMAL

## 2017-05-26 NOTE — PATIENT INSTRUCTIONS
~Avoid overhead pulling, pushing for next 2-3 weeks, if continues to hurts, please call Dr. Gutierrez  ~Can take Tylenol or Acetaminophen, can take 2 tablets up to 4 times per day for side pain  ~Depending on diabetes lab, will see if need to be seen sooner or else 3 months follow up    Your medication list is printed, please keep this with you, it is helpful to bring this current list to any other medical appointments, the emergency room or hospital.    If you had lab testing today and your results are reassuring or normal they will be be mailed to you within 7 days.     If the lab tests need quick action we will call you with the results.   The phone number we will call with results is # [phone] (home) . If this is not the best number please call our clinic and change the number.    If you need any refills please call your pharmacy and they will contact us.    If you have any further concerns or wish to schedule another appointment you must call our office during normal business hours  [phone] (8-5:00 M-F)  If you have urgent medical questions that cannot wait  you may also call [phone] at any time of day.  If you have a medical emergency please call [phone].    Thank you for coming to [facility].

## 2017-05-26 NOTE — LETTER
June 1, 2017      Elie Martinez  [address] [place] [address]   [place] [address]        Dear Elie,    As we discussed in clinic, your 3 month blood sugar average improved to 8.5%     Please see below for your test results.    Resulted Orders   Urinalysis, Micro If ([facility])   Result Value Ref Range    Specific Gravity Urine 1.015 1.005 - 1.030    pH Urine 5.0 4.5 - 8.0    Leukocyte Esterase UR 2+ (A) NEGATIVE    Nitrite Urine Negative NEGATIVE    Protein UR Negative NEGATIVE    Glucose Urine Trace (A) NEGATIVE    Ketones Urine Negative NEGATIVE    Urobilinogen mg/dL 0.2 E.U./dL 0.2 E.U./dL    Bilirubin UR Negative NEGATIVE    Blood UR Negative NEGATIVE    Color Urine Yellow     Clarity, urine Clear    Hemoglobin A1c ([facility])   Result Value Ref Range    Hemoglobin A1C 8.5 (H) 4.1 - 5.7 %   Basic Metabolic Panel ([facility]) - Results < 1 hr   Result Value Ref Range    Glucose 149.0 (H) 60.0 - 109.0 mg/dL    Urea Nitrogen 22.0 7.0 - 30.0 mg/dL    Creatinine 1.2 0.8 - 1.5 mg/dL    Sodium 141.0 133.0 - 144.0 mmol/L    Potassium 4.6 3.4 - 5.3 mmol/L    Chloride 98.0 94.0 - 109.0 mmol/L    Carbon Dioxide 28.0 20.0 - 32.0 mmol/L    Calcium 10.2 8.5 - 10.4 mg/dL    eGFR Calculated (Non Black Reference) 61.3 >60.0 mL/min    eGFR Calculated ([facility] Reference) 74.2 >60.0 mL/min       If you have any questions, please call the clinic to make an appointment.    Sincerely,    Jhon Gutierrez MD

## 2017-05-26 NOTE — NURSING NOTE
5/24/2017 PCS Previsit Plan   DUE FOR:  TSH?  Tdap at pharmacy due to Medicare insurance  Advance directive-advised to bring clinic a copy  Fall risk assessment  Foot exam-MD to ALBERT Moreno

## 2017-05-26 NOTE — MR AVS SNAPSHOT
After Visit Summary   5/26/2017    Elie Martinez    MRN: [id number]           Patient Information     Date Of Birth          9/8/1932        Visit Information        Provider Department      5/26/2017 1:40 PM Jhon Gutierrez MD [facility]        Today's Diagnoses     Right flank pain    -  1    Diabetes mellitus with hyperglycemia (H)          Care Instructions    ~Avoid overhead pulling, pushing for next 2-3 weeks, if continues to hurts, please call Dr. Gutierrez  ~Can take Tylenol or Acetaminophen, can take 2 tablets up to 4 times per day for side pain  ~Depending on diabetes lab, will see if need to be seen sooner or else 3 months follow up    Your medication list is printed, please keep this with you, it is helpful to bring this current list to any other medical appointments, the emergency room or hospital.    If you had lab testing today and your results are reassuring or normal they will be be mailed to you within 7 days.     If the lab tests need quick action we will call you with the results.   The phone number we will call with results is # [phone] (home) . If this is not the best number please call our clinic and change the number.    If you need any refills please call your pharmacy and they will contact us.    If you have any further concerns or wish to schedule another appointment you must call our office during normal business hours  [phone] (8-5:00 M-F)  If you have urgent medical questions that cannot wait  you may also call [phone] at any time of day.  If you have a medical emergency please call [phone].    Thank you for coming to [facility].            Follow-ups after your visit        Who to contact     Please call your clinic at [phone] to:    Ask questions about your health    Make or cancel appointments    Discuss your medicines    Learn about your test results    Speak to your doctor   If you have compliments or concerns about an experience at  your clinic, or if you wish to file a complaint, please contact [facility] Patient Relations at [phone] or email us at [email or web address]         Additional Information About Your Visit        Care EveryWhere ID     This is your Care EveryWhere ID. This could be used by other organizations to access your [facility] medical records  [id number]        Your Vitals Were     Pulse Temperature Respirations Pulse Oximetry BMI (Body Mass Index)       82 98  F (36.7  C) (Oral) 18 95% 28.81 kg/m2        Blood Pressure from Last 3 Encounters:   05/26/17 129/76   02/16/17 151/65   02/02/17 95/62    Weight from Last 3 Encounters:   05/26/17 212 lb 6.4 oz (96.3 kg)   02/16/17 218 lb 9.6 oz (99.2 kg)   02/02/17 218 lb (98.9 kg)              We Performed the Following     Basic Metabolic Panel ([facility]) - Results < 1 hr     Hemoglobin A1c ([facility])     Urinalysis, Micro If ([facility])        Primary Care Provider Office Phone # Fax #    Jhon Gutierrez [phone]       [facility] [address] [place] [address]        Thank you!     Thank you for choosing [facility]  for your care. Our goal is always to provide you with excellent care. Hearing back from our patients is one way we can continue to improve our services. Please take a few minutes to complete the written survey that you may receive in the mail after your visit with us. Thank you!             Your Updated Medication List - Protect others around you: Learn how to safely use, store and throw away your medicines at www.disposemymeds.org.          This list is accurate as of: 5/26/17  2:20 PM.  Always use your most recent med list.                   Brand Name Dispense Instructions for use    aspirin 325 MG tablet     90 tablet    Take 1 tablet (325 mg) by mouth daily       atorvastatin 40 MG tablet    LIPITOR    90 tablet    Take 1 tablet (40 mg) by mouth daily       * blood glucose monitoring  meter device kit     1 kit    Use to test blood sugar 4 times daily or as directed.       * blood glucose monitoring meter device kit     1 kit    Use to test blood sugars 3-4 times daily or as directed.       * ONE TOUCH BASIC SYSTEM W/DEVICE Kit     1 kit    Use to test blood sugar 3-4 times daily or as directed. (Please dispense LifeScan or preferred brand by insurance)       * blood glucose monitoring test strip    FREESTYLE INSULINX    1 Box    Use to test blood sugar 4 times daily or as directed.       * blood glucose monitoring test strip    ONE TOUCH TEST STRIPS    1 Box    Use to test blood sugar 4 times daily or as directed. (Please dispense LifeScan or preferred brand by insurance)       * blood glucose monitoring test strip    FREESTYLE LITE    1 Box    Use to test blood sugars 3-4 times daily or as directed.       clotrimazole-betamethasone cream    LOTRISONE    45 g    Apply topically 2 times daily       glipiZIDE 5 MG tablet    GLUCOTROL    180 tablet    Take 1 tablet (5 mg) by mouth 2 times daily (before meals)       lisinopril 10 MG tablet    PRINIVIL/ZESTRIL    90 tablet    Take 1 tablet (10 mg) by mouth daily       metFORMIN 500 MG 24 hr tablet    GLUCOPHAGE-XR    360 tablet    Take 1 tablet (500 mg) by mouth 2 times daily (with meals)       ONE TOUCH LANCETS Misc     100 each    Use to test blood sugars 3-4 times daily or as directed. (Please dispense LifeScan or preferred brand by insurance)       * Notice:  This list has 6 medication(s) that are the same as other medications prescribed for you. Read the directions carefully, and ask your doctor or other care provider to review them with you.

## 2017-05-26 NOTE — PROGRESS NOTES
SUBJECTIVE:  This is an 84-year-old male with type 2 diabetes, cerebrovascular disease status post carotid endarterectomy, hypertension, stage III chronic kidney disease and prostate cancer status post treatment.  His wife  earlier this month.  She had been helpful with COPD.  He is coping well per his report with her death.  He feels like he is surrounded with family which has been helpful.   As far as his diabetes, he is on metformin 500 mg twice a day, which is the highest he dose can tolerate without loose stools.  He has been using a pillbox regularly now.  He denies any hypoglycemic symptoms.  He is using glipizide 5 mg twice daily with meals but sometimes forgets his afternoon dose of medication, but he thinks since using a pillbox since early February he is using his medication more consistently.   As far as his hypertension, this winter we reduced his lisinopril from 10 to 5 mg due to relative low blood pressure.  He has not had dizziness, lightheadedness.  No headaches or vision changes.   He also has an intermittent discomfort.  He points to his right rib cage just anterior to the mid axillary line.  He has been doing some new exercises over the past 3 weeks which include some overhead reaching and pulling activities.  He thinks this may be contributing.  He has not had any skin changes, rash or blistering.  No abdominal pain, change in his stools, blood in stools or black stools.  No change in his urination, pain with urination, or gross hematuria.   REVIEW OF SYSTEMS:  No fevers or chills, no chest pain episodes.  No dyspnea.  He has not been checking his blood sugars recently.  The remainder of the review of systems as outlined above.   PHYSICAL EXAMINATION:   VITAL SIGNS:  As above with his blood pressure at goal on repeat.   GENERAL:  He is alert, no distress, relates his own history.   HEENT:  Head is normocephalic and atraumatic.  Eyes, sclerae are nonicteric, noninjected.  Moist mucous  membranes.   NECK:  Thick and supple without lymphadenopathy.   CARDIOVASCULAR:  Regular rate and rhythm without murmur.   LUNGS:  Clear with fair air exchange bilaterally.   ABDOMEN:  Soft and nontender throughout.   EXTREMITIES:  Examination of his right side and flank show an approximately 1 cm subcutaneous nodule at the right flank near the mid axillary line and T5 rib space.  This is just posterior to the area of his discomfort.  He does not have any tenderness to palpation.  There is no crepitus.  The skin is normal-appearing.  There is no rash and no vesicles.  He is able to reach above his head, push and pull against resistance without discomfort.  There are no areas of point tenderness.  Extremities are warm, with trace bilateral peripheral edema.   ASSESSMENT AND PLAN:   1.  Type 2 diabetes:  His last A1c was 11.3%.  At that point we felt poor afternoon glipizide adherence is likely contributing and the fact that his metformin had to be decreased due to loose stools.  He has been using a pillbox regularly since February, an A1c was obtained today and is pending.  I have asked him to check his blood glucose 3-5 times per week at rotating times to get more information for medication management.  He will eat a snack and call for further advice if he has any blood sugar readings under 70.   2.  Right flank pain:  No clear etiology at this point, it is not overly bothersome.  I ordered a urinalysis to look for hematuria, although a kidney stone is unlikely.  If he does have microscopic hematuria would increase my suspicion.  Most likely it seems musculoskeletal with intercostal muscle pain, I recommend avoiding his new reaching and pulling activities the next 2-3 weeks to see if that improves.  If not he will call or return for further evaluation.   3.  Hypertension:  He is back on lisinopril 10 mg a day since February, we plan for a basic metabolic profile today.  His goal blood pressure is 140/90 in light of  cerebrovascular disease.   4.  History of prostate cancer:  He tells me the urologist did a PSA sometime this year.  As far as I know his last PSA was 0.8.   5.  Adjustment:  He seems to be adjusting to the loss of his wife.  He feels supported at this point declines other assistance.

## 2017-06-01 ENCOUNTER — TELEPHONE (OUTPATIENT)
Dept: FAMILY MEDICINE | Facility: CLINIC | Age: 82
End: 2017-06-01

## 2017-06-01 ENCOUNTER — OFFICE VISIT (OUTPATIENT)
Dept: FAMILY MEDICINE | Facility: CLINIC | Age: 82
End: 2017-06-01

## 2017-06-01 VITALS
HEART RATE: 70 BPM | TEMPERATURE: 97.6 F | SYSTOLIC BLOOD PRESSURE: 149 MMHG | RESPIRATION RATE: 22 BRPM | DIASTOLIC BLOOD PRESSURE: 64 MMHG | BODY MASS INDEX: 29.12 KG/M2 | WEIGHT: 215 LBS | HEIGHT: 72 IN | OXYGEN SATURATION: 96 %

## 2017-06-01 DIAGNOSIS — R23.8 SKIN BREAKDOWN: Primary | ICD-10-CM

## 2017-06-01 RX ORDER — MUPIROCIN 20 MG/G
OINTMENT TOPICAL 3 TIMES DAILY
Qty: 30 G | Refills: 0 | Status: SHIPPED | OUTPATIENT
Start: 2017-06-01 | End: 2017-09-11

## 2017-06-01 NOTE — PROGRESS NOTES
SUBJECTIVE:  This is an 84-year-old male.  I last saw Mr. Martinez last week where he was having some flank pain in his right lower rib cage.  His flank pain has resolved, but he noticed a spot of blood the day after he saw me in the front of his underwear.  This has been happening most days since that time.  No pain with urination, change his urinary frequency, fevers or chills.  He has not been able to examine his own penis.   REVIEW OF SYSTEMS:  His flank pain has resolved.  No chills.  No change in urinary frequency.  He last had a urinary catheter in [place] a long time ago.  No injury to his penis.  He does not look at his penis when he urinates and retracts his foreskin.   OBJECTIVE:   VITAL SIGNS:  As above.   GENERAL:  He is alert, no distress, relates his own history.   GENITOURINARY:  He does have a spot of blood in the front of his undergarment at the area of his penis, when we retract his foreskin right near the corona anteriorly, there is a scant amount of blood.  There appears to be a shallow area of ulceration near the edge of the foreskin, he is unable to fully retract his foreskin over the corona.  There are no color changes.  There is no pain.  His urethral opening is lengthened likely from the previous catheter.   ASSESSMENT AND PLAN:   1.  Irritation of the glans near the corona where there is contact with the foreskin:  He had a urinalysis without blood at his last visit.  I recommend trying to gently retract the foreskin and use some Bactroban ointment to keep the area free of contact irritation from his foreskin and then replace his foreskin and he will do this 2-3 times a day to keep the area covered with ointment.  He will return to have me examine the area no later than 14 days.  If there is any worsening, any increase in bleeding, he should see his urologist.  If after my exam in 2 weeks the area is not significantly improved I will recommend consultation with a urologist as well.

## 2017-06-01 NOTE — TELEPHONE ENCOUNTER
Spoke with Juarez for follow up details. C/o continued right flank pain, intermittent pain-no known trigger, goes away on its own. Don reports in the mornings noticing reddish secretions/discharge on his underwear. Otherwise on urination does not visibly see blood in urine. Touching right side makes pain more intense. Reviewed Dr Gutierrez's last visit note, advised because of report of changed symptoms and on last UA- no hematuria seen, to come in this morning to be seen. Agreed by Juarez, an appt scheduled to see Dr Gutierrez at 1120am. Denis PARNELL

## 2017-06-01 NOTE — PROGRESS NOTES
Please send result letter    As we discussed in clinic, your 3 month blood sugar average improved to 8.5%

## 2017-06-01 NOTE — MR AVS SNAPSHOT
After Visit Summary   6/1/2017    Elie Martinez    MRN: [id number]           Patient Information     Date Of Birth          9/8/1932        Visit Information        Provider Department      6/1/2017 11:20 AM Jhon Gutierrez MD [facility]        Today's Diagnoses     Skin breakdown    -  1      Care Instructions    - Apply bacitracin 2-3 times a day for 2 weeks to effected area.  - Follow-up with Dr Gutierrez in 2 weeks.  - If no improvement, see urologist.    Your medication list is printed, please keep this with you, it is helpful to bring this current list to any other medical appointments, the emergency room or hospital.    If you had lab testing today and your results are reassuring or normal they will be be mailed to you within 7 days.     If the lab tests need quick action we will call you with the results.   The phone number we will call with results is # [phone] (home) . If this is not the best number please call our clinic and change the number.    If you need any refills please call your pharmacy and they will contact us.    If you have any further concerns or wish to schedule another appointment you must call our office during normal business hours  [phone] (8-5:00 M-F)  If you have urgent medical questions that cannot wait  you may also call [phone] at any time of day.  If you have a medical emergency please call [phone].    Thank you for coming to [facility].            Follow-ups after your visit        Who to contact     Please call your clinic at [phone] to:    Ask questions about your health    Make or cancel appointments    Discuss your medicines    Learn about your test results    Speak to your doctor   If you have compliments or concerns about an experience at your clinic, or if you wish to file a complaint, please contact [facility] Patient Relations at [phone] or email us at [email or web address]          Additional Information About Your Visit        Care EveryWhere ID     This is your Care EveryWhere ID. This could be used by other organizations to access your [place] medical records  [id number]        Your Vitals Were     Pulse Temperature Respirations Height Pulse Oximetry BMI (Body Mass Index)    70 97.6  F (36.4  C) (Oral) 22 6' (182.9 cm) 96% 29.16 kg/m2       Blood Pressure from Last 3 Encounters:   06/01/17 149/64   05/26/17 129/76   02/16/17 151/65    Weight from Last 3 Encounters:   06/01/17 215 lb (97.5 kg)   05/26/17 212 lb 6.4 oz (96.3 kg)   02/16/17 218 lb 9.6 oz (99.2 kg)              Today, you had the following     No orders found for display         Today's Medication Changes          These changes are accurate as of: 6/1/17 11:44 AM.  If you have any questions, ask your nurse or doctor.               Start taking these medicines.        Dose/Directions    mupirocin 2 % ointment   Commonly known as:  BACTROBAN   Used for:  Skin breakdown   Started by:  Jhon Gutierrez MD        Apply topically 3 times daily   Quantity:  30 g   Refills:  0            Where to get your medicines      These medications were sent to [facility] - [address] [address]     Phone:  [phone]     mupirocin 2 % ointment                Primary Care Provider Office Phone # Fax #    Jhon Gutierrez [phone]       [facility] [address] [place] [address]        Thank you!     Thank you for choosing [facility]  for your care. Our goal is always to provide you with excellent care. Hearing back from our patients is one way we can continue to improve our services. Please take a few minutes to complete the written survey that you may receive in the mail after your visit with us. Thank you!             Your Updated Medication List - Protect others around you: Learn how to safely use, store and throw away  your medicines at www.disposemymeds.org.          This list is accurate as of: 6/1/17 11:44 AM.  Always use your most recent med list.                   Brand Name Dispense Instructions for use    aspirin 325 MG tablet     90 tablet    Take 1 tablet (325 mg) by mouth daily       atorvastatin 40 MG tablet    LIPITOR    90 tablet    Take 1 tablet (40 mg) by mouth daily       * blood glucose monitoring meter device kit     1 kit    Use to test blood sugar 4 times daily or as directed.       * blood glucose monitoring meter device kit     1 kit    Use to test blood sugars 3-4 times daily or as directed.       * ONE TOUCH BASIC SYSTEM W/DEVICE Kit     1 kit    Use to test blood sugar 3-4 times daily or as directed. (Please dispense LifeScan or preferred brand by insurance)       * blood glucose monitoring test strip    FREESTYLE INSULINX    1 Box    Use to test blood sugar 4 times daily or as directed.       * blood glucose monitoring test strip    ONE TOUCH TEST STRIPS    1 Box    Use to test blood sugar 4 times daily or as directed. (Please dispense LifeScan or preferred brand by insurance)       * blood glucose monitoring test strip    FREESTYLE LITE    1 Box    Use to test blood sugars 3-4 times daily or as directed.       clotrimazole-betamethasone cream    LOTRISONE    45 g    Apply topically 2 times daily       glipiZIDE 5 MG tablet    GLUCOTROL    180 tablet    Take 1 tablet (5 mg) by mouth 2 times daily (before meals)       lisinopril 10 MG tablet    PRINIVIL/ZESTRIL    90 tablet    Take 1 tablet (10 mg) by mouth daily       metFORMIN 500 MG 24 hr tablet    GLUCOPHAGE-XR    360 tablet    Take 1 tablet (500 mg) by mouth 2 times daily (with meals)       mupirocin 2 % ointment    BACTROBAN    30 g    Apply topically 3 times daily       ONE TOUCH LANCETS Misc     100 each    Use to test blood sugars 3-4 times daily or as directed. (Please dispense LifeScan or preferred brand by insurance)       * Notice:  This list  has 6 medication(s) that are the same as other medications prescribed for you. Read the directions carefully, and ask your doctor or other care provider to review them with you.

## 2017-06-01 NOTE — PATIENT INSTRUCTIONS
- Apply bacitracin 2-3 times a day for 2 weeks to effected area.  - Follow-up with Dr Gutierrez in 2 weeks.  - If no improvement, see urologist.    Your medication list is printed, please keep this with you, it is helpful to bring this current list to any other medical appointments, the emergency room or hospital.    If you had lab testing today and your results are reassuring or normal they will be be mailed to you within 7 days.     If the lab tests need quick action we will call you with the results.   The phone number we will call with results is # [phone] (home) . If this is not the best number please call our clinic and change the number.    If you need any refills please call your pharmacy and they will contact us.    If you have any further concerns or wish to schedule another appointment you must call our office during normal business hours  [phone] (8-5:00 M-F)  If you have urgent medical questions that cannot wait  you may also call [phone] at any time of day.  If you have a medical emergency please call [phone].    Thank you for coming to [facility].

## 2017-07-31 DIAGNOSIS — E11.65 TYPE 2 DIABETES MELLITUS WITH HYPERGLYCEMIA, WITHOUT LONG-TERM CURRENT USE OF INSULIN (H): ICD-10-CM

## 2017-08-04 RX ORDER — METFORMIN HCL 500 MG
500 TABLET, EXTENDED RELEASE 24 HR ORAL 2 TIMES DAILY WITH MEALS
Qty: 360 TABLET | Refills: 3 | Status: SHIPPED | OUTPATIENT
Start: 2017-08-04 | End: 2018-03-12

## 2017-08-21 ENCOUNTER — OFFICE VISIT (OUTPATIENT)
Dept: FAMILY MEDICINE | Facility: CLINIC | Age: 82
End: 2017-08-21

## 2017-08-21 VITALS
SYSTOLIC BLOOD PRESSURE: 139 MMHG | HEIGHT: 72 IN | BODY MASS INDEX: 29.42 KG/M2 | TEMPERATURE: 98.1 F | WEIGHT: 217.2 LBS | OXYGEN SATURATION: 97 % | HEART RATE: 79 BPM | RESPIRATION RATE: 20 BRPM | DIASTOLIC BLOOD PRESSURE: 85 MMHG

## 2017-08-21 DIAGNOSIS — Z53.9 ERRONEOUS ENCOUNTER--DISREGARD: Primary | ICD-10-CM

## 2017-08-21 DIAGNOSIS — Z23 IMMUNIZATION DUE: ICD-10-CM

## 2017-08-21 NOTE — PROGRESS NOTES
Patient presented to clinic, but had an outside obligation and was unable to wait for physician visit.  Will reschedule. FREDIS

## 2017-08-21 NOTE — MR AVS SNAPSHOT
After Visit Summary   8/21/2017    Elie Martinez    MRN: [id number]           Patient Information     Date Of Birth          9/8/1932        Visit Information        Provider Department      8/21/2017 10:40 AM Jhon Gutierrez MD [facility]        Today's Diagnoses     ERRONEOUS ENCOUNTER--DISREGARD    -  1    Immunization due           Follow-ups after your visit        Who to contact     Please call your clinic at [phone] to:    Ask questions about your health    Make or cancel appointments    Discuss your medicines    Learn about your test results    Speak to your doctor   If you have compliments or concerns about an experience at your clinic, or if you wish to file a complaint, please contact [facility] Patient Relations at [phone] or email us at [email or web address]         Additional Information About Your Visit        Care EveryWhere ID     This is your Care EveryWhere ID. This could be used by other organizations to access your [facility] medical records  [id number]        Your Vitals Were     Pulse Temperature Respirations Height Pulse Oximetry BMI (Body Mass Index)    79 98.1  F (36.7  C) (Oral) 20 6' (182.9 cm) 97% 29.46 kg/m2       Blood Pressure from Last 3 Encounters:   08/21/17 139/85   06/01/17 149/64   05/26/17 129/76    Weight from Last 3 Encounters:   08/21/17 217 lb 3.2 oz (98.5 kg)   06/01/17 215 lb (97.5 kg)   05/26/17 212 lb 6.4 oz (96.3 kg)              We Performed the Following     ADMIN VACCINE, INITIAL     TDAP VACCINE (BOOSTRIX)        Primary Care Provider Office Phone # Fax #    Jhon Gutierrez [phone]       [facility] [address] [place] [id number]        Equal Access to Services     [facility] Center[facility] : Hadii tristian Alvarado, waaxda manav, qaybta kaalmada marge, carlos hernandez. So [facility] [phone].    ATENCIÓN: Si mendez carmichael, meche zelaya ponce  disposición servicios gratuitos de asistencia lingüística. Gerson paige [phone].    We comply with applicable federal civil rights laws and [place] laws. We do not discriminate on the basis of race, color, national origin, age, disability sex, sexual orientation or gender identity.            Thank you!     Thank you for choosing [facility]  for your care. Our goal is always to provide you with excellent care. Hearing back from our patients is one way we can continue to improve our services. Please take a few minutes to complete the written survey that you may receive in the mail after your visit with us. Thank you!             Your Updated Medication List - Protect others around you: Learn how to safely use, store and throw away your medicines at www.disposemymeds.org.          This list is accurate as of: 8/21/17 12:50 PM.  Always use your most recent med list.                   Brand Name Dispense Instructions for use Diagnosis    aspirin 325 MG tablet     90 tablet    Take 1 tablet (325 mg) by mouth daily    Cerebral vascular disease       atorvastatin 40 MG tablet    LIPITOR    90 tablet    Take 1 tablet (40 mg) by mouth daily    Cerebral vascular disease       * blood glucose monitoring meter device kit     1 kit    Use to test blood sugar 4 times daily or as directed.    Type 2 diabetes mellitus without complication (H)       * blood glucose monitoring meter device kit     1 kit    Use to test blood sugars 3-4 times daily or as directed.    Type 2 diabetes mellitus without complication (H)       * ONE TOUCH BASIC SYSTEM W/DEVICE Kit     1 kit    Use to test blood sugar 3-4 times daily or as directed. (Please dispense LifeScan or preferred brand by insurance)    Type 2 diabetes mellitus without complication (H)       * blood glucose monitoring test strip    FREESTYLE INSULINX    1 Box    Use to test blood sugar 4 times daily or as directed.    Type 2 diabetes mellitus without complication (H)        * blood glucose monitoring test strip    ONE TOUCH TEST STRIPS    1 Box    Use to test blood sugar 4 times daily or as directed. (Please dispense LifeScan or preferred brand by insurance)    Type 2 diabetes mellitus without complication (H)       * blood glucose monitoring test strip    FREESTYLE LITE    1 Box    Use to test blood sugars 3-4 times daily or as directed.    Type 2 diabetes mellitus without complication, without long-term current use of insulin (H)       clotrimazole-betamethasone cream    LOTRISONE    45 g    Apply topically 2 times daily    Tinea corporis       glipiZIDE 5 MG tablet    GLUCOTROL    180 tablet    Take 1 tablet (5 mg) by mouth 2 times daily (before meals)    Type 2 diabetes mellitus with hyperglycemia, unspecified long term insulin use status (H)       lisinopril 10 MG tablet    PRINIVIL/ZESTRIL    90 tablet    Take 1 tablet (10 mg) by mouth daily    Benign essential hypertension       metFORMIN 500 MG 24 hr tablet    GLUCOPHAGE-XR    360 tablet    Take 1 tablet (500 mg) by mouth 2 times daily (with meals)    Type 2 diabetes mellitus with hyperglycemia, without long-term current use of insulin (H)       mupirocin 2 % ointment    BACTROBAN    30 g    Apply topically 3 times daily    Skin breakdown       ONE TOUCH LANCETS Misc     100 each    Use to test blood sugars 3-4 times daily or as directed. (Please dispense LifeScan or preferred brand by insurance)    Type 2 diabetes mellitus without complication (H)       * Notice:  This list has 6 medication(s) that are the same as other medications prescribed for you. Read the directions carefully, and ask your doctor or other care provider to review them with you.

## 2017-09-11 ENCOUNTER — OFFICE VISIT (OUTPATIENT)
Dept: FAMILY MEDICINE | Facility: CLINIC | Age: 82
End: 2017-09-11

## 2017-09-11 VITALS
TEMPERATURE: 97.9 F | WEIGHT: 217 LBS | HEIGHT: 72 IN | OXYGEN SATURATION: 97 % | BODY MASS INDEX: 29.39 KG/M2 | DIASTOLIC BLOOD PRESSURE: 73 MMHG | SYSTOLIC BLOOD PRESSURE: 132 MMHG | HEART RATE: 86 BPM

## 2017-09-11 DIAGNOSIS — R23.8 SKIN BREAKDOWN: ICD-10-CM

## 2017-09-11 DIAGNOSIS — R19.7 DIARRHEA, UNSPECIFIED TYPE: ICD-10-CM

## 2017-09-11 DIAGNOSIS — E66.3 PATIENT OVERWEIGHT: ICD-10-CM

## 2017-09-11 DIAGNOSIS — E11.65 TYPE 2 DIABETES MELLITUS WITH HYPERGLYCEMIA, UNSPECIFIED LONG TERM INSULIN USE STATUS: Primary | ICD-10-CM

## 2017-09-11 LAB — HBA1C MFR BLD: 9.3 % (ref 4.1–5.7)

## 2017-09-11 RX ORDER — MUPIROCIN 20 MG/G
OINTMENT TOPICAL 3 TIMES DAILY
Qty: 30 G | Refills: 0 | Status: SHIPPED | OUTPATIENT
Start: 2017-09-11 | End: 2017-09-11

## 2017-09-11 RX ORDER — MUPIROCIN 20 MG/G
OINTMENT TOPICAL 3 TIMES DAILY
Qty: 30 G | Refills: 1 | Status: SHIPPED | OUTPATIENT
Start: 2017-09-11 | End: 2018-01-29

## 2017-09-11 NOTE — PROGRESS NOTES
"SUBJECTIVE:  This is a recently turned 85-year-old male with a history of type 2 diabetes, cerebrovascular disease status post carotid endarterectomy, hypertension, stage III chronic kidney disease and prostate cancer status post treatment.  His wife passed away in 05/2017.  He feels supported by his family and reports a 4 day birthday celebration over the weekend which he enjoyed.  His first concern today is the return of loose stools I saw him in February where he was having problems with loose stools.  This resolves when I reduced his metformin from 1000 mg twice a day to 500 mg twice a day.  For some reason he has been taking metformin 500 mg 3 times daily since I last saw him.  In addition he has been using some Metamucil as he recalls being advised to take Metamucil every day.  He has occasional loose stools which were \"come on so quick some time I have an accident.  This is similar to back in February.   Next, as far as his diabetes, he cannot recall whether he is using glipizide once or twice per day.  He has not had any hypoglycemic symptoms, shaking, dizziness or feeling unwell.  He is trying to use a pillbox, which he feels is helpful.   As far as his hypertension no dizziness, lightheadedness or orthostatic symptoms.   REVIEW OF SYSTEMS:  No chest pains, no palpitations, no shortness of breath, no change in his exercise tolerance.  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  He is involved with his family.   OBJECTIVE:   VITAL SIGNS:  As above with blood pressure at goal.   GENERAL:  He is alert and in no distress, relates his own history, although he lacks detail in his medication dosing and timing.   HEENT:  Head is normocephalic and atraumatic.  Eyes, sclerae are nonicteric, noninjected.  Moist mucous membranes.   NECK:  Supple without adenopathy.   CARDIOVASCULAR:  Regular rate and rhythm without murmur.   LUNGS:  Clear with fair air exchange " bilaterally.   ABDOMEN:  Soft and nontender.   EXTREMITIES:  With trace bilateral peripheral edema.   ASSESSMENT AND PLAN:   1.  Loose stools:  I decreased his metformin to 500 mg twice a day.  I anticipate this will resolve his loose stool problem as it did back in 02/2017.  My concern is around his medication adherence, likely due to memory deficits contributing.  He is using a pillbox.  He has some family involved.  I have offered a  homecare nurse and I think some outside supervision of his medication adherence would be helpful.  I also recommended that he stop using the Metamucil at this point.     2.  Type 2 diabetes:  A1c was last 8.5%.  His goal is 8.5% due to his age.  He will reduce his metformin 500 mg twice day, continue glipizide twice daily with meals.  We will await A1c readings for further recommendations.  I also recommended avoiding sweetened beverages and sweetened snacks and trying to do some daily walking.   3.  Right flank pain:  This resolved in early summer and has not recurred.   4.  Hypertension:  He is tolerating lisinopril 10 mg a day, no symptoms.   5.  History of prostate cancer:  He tells me he follows with a urologist and as far as I know his last PSA was 0.8.         Body mass index is 29.43 kg/(m^2).  Weight management plan: Discussed healthy diet and exercise guidelines and patient will follow up in 3 months in clinic to re-evaluate.

## 2017-09-11 NOTE — MR AVS SNAPSHOT
After Visit Summary   9/11/2017    Elie Martinez    MRN: [id number]           Patient Information     Date Of Birth          9/8/1932        Visit Information        Provider Department      9/11/2017 10:40 AM Jhon Gutierrez MD [facility]        Today's Diagnoses     Type 2 diabetes mellitus with hyperglycemia, unspecified long term insulin use status (H)    -  1    Skin breakdown        Diarrhea, unspecified type          Care Instructions    ~ Stop Metamucil.     ~ Take Metformin 500 mg twice daily.     ~ Take your Glipizide 5 mg twice daily with food.     ~ Checked your 3 months blood sugar average test today. (A1c)    Follow up in December or January if blood test is normal.       Your medication list is printed, please keep this with you, it is helpful to bring this current list to any other medical appointments, the emergency room or hospital.    If you had lab testing today and your results are reassuring or normal they will be be mailed to you within 7 days.     If the lab tests need quick action we will call you with the results.   The phone number we will call with results is # [phone] (home) . If this is not the best number please call our clinic and change the number.    If you need any refills please call your pharmacy and they will contact us.    If you have any further concerns or wish to schedule another appointment you must call our office during normal business hours  [phone] (8-5:00 M-F)  If you have urgent medical questions that cannot wait  you may also call [phone] at any time of day.  If you have a medical emergency please call [phone].    Thank you for coming to [facility].            Follow-ups after your visit        Who to contact     Please call your clinic at [phone] to:    Ask questions about your health    Make or cancel appointments    Discuss your medicines    Learn about your test results    Speak to your doctor   If you have  compliments or concerns about an experience at your clinic, or if you wish to file a complaint, please contact [facility] Patient Relations at [phone] or email us at [email or web address]         Additional Information About Your Visit        [employer] Information     [employer] is an electronic gateway that provides easy, online access to your medical records. With [employer], you can request a clinic appointment, read your test results, renew a prescription or communicate with your care team.     To sign up for [employer] visit the website at [email or web address]   You will be asked to enter the access code listed below, as well as some personal information. Please follow the directions to create your username and password.     Your access code is: [email or web address]  Expires: 12/10/2017 11:18 AM     Your access code will  in 90 days. If you need help or a new code, please contact your [facility] or call [phone] for assistance.        Care EveryWhere ID     This is your Care EveryWhere ID. This could be used by other organizations to access your [place] medical records  [id number]        Your Vitals Were     Pulse Temperature Height Pulse Oximetry BMI (Body Mass Index)       86 97.9  F (36.6  C) (Oral) 6' (182.9 cm) 97% 29.43 kg/m2        Blood Pressure from Last 3 Encounters:   17 132/73   17 139/85   17 149/64    Weight from Last 3 Encounters:   17 217 lb (98.4 kg)   17 217 lb 3.2 oz (98.5 kg)   17 215 lb (97.5 kg)              We Performed the Following     Hemoglobin A1c (P FM)          Today's Medication Changes          These changes are accurate as of: 17 11:20 AM.  If you have any questions, ask your nurse or doctor.               Start taking these medicines.        Dose/Directions    mupirocin 2 % ointment   Commonly known as:  BACTROBAN   Used for:  Skin breakdown   Started by:  Matt  Jhon HURST MD        Apply topically 3 times daily   Quantity:  30 g   Refills:  1            Where to get your medicines      These medications were sent to [facility] - [place], [place] - [address]  [address] [address]     Phone:  [phone]     mupirocin 2 % ointment                Primary Care Provider Office Phone # Fax #    Jhon Gutierrez [phone]       [facility] [address] [place] [address]        Equal Access to Services     [facility]: Hadii aad ku hadasho Soomaali, waaxda luqadaha, qaybta kaalmada adeegyada, waxay idiin hayaan adeeg kharash lavicenta . So [facility] [phone].    ATENCIÓN: Si habla español, tiene a ponce disposición servicios gratuitos de asistencia lingüística. [facility] [phone].    We comply with applicable federal civil rights laws and [place] laws. We do not discriminate on the basis of race, color, national origin, age, disability sex, sexual orientation or gender identity.            Thank you!     Thank you for choosing [facility]  for your care. Our goal is always to provide you with excellent care. Hearing back from our patients is one way we can continue to improve our services. Please take a few minutes to complete the written survey that you may receive in the mail after your visit with us. Thank you!             Your Updated Medication List - Protect others around you: Learn how to safely use, store and throw away your medicines at www.disposemymeds.org.          This list is accurate as of: 9/11/17 11:20 AM.  Always use your most recent med list.                   Brand Name Dispense Instructions for use Diagnosis    aspirin 325 MG tablet     90 tablet    Take 1 tablet (325 mg) by mouth daily    Cerebral vascular disease       atorvastatin 40 MG tablet    LIPITOR    90 tablet    Take 1 tablet (40 mg) by mouth daily    Cerebral vascular disease       * blood glucose monitoring  meter device kit     1 kit    Use to test blood sugar 4 times daily or as directed.    Type 2 diabetes mellitus without complication (H)       * blood glucose monitoring meter device kit     1 kit    Use to test blood sugars 3-4 times daily or as directed.    Type 2 diabetes mellitus without complication (H)       * ONE TOUCH BASIC SYSTEM W/DEVICE Kit     1 kit    Use to test blood sugar 3-4 times daily or as directed. (Please dispense LifeScan or preferred brand by insurance)    Type 2 diabetes mellitus without complication (H)       * blood glucose monitoring test strip    FREESTYLE INSULINX    1 Box    Use to test blood sugar 4 times daily or as directed.    Type 2 diabetes mellitus without complication (H)       * blood glucose monitoring test strip    ONE TOUCH TEST STRIPS    1 Box    Use to test blood sugar 4 times daily or as directed. (Please dispense LifeScan or preferred brand by insurance)    Type 2 diabetes mellitus without complication (H)       * blood glucose monitoring test strip    FREESTYLE LITE    1 Box    Use to test blood sugars 3-4 times daily or as directed.    Type 2 diabetes mellitus without complication, without long-term current use of insulin (H)       clotrimazole-betamethasone cream    LOTRISONE    45 g    Apply topically 2 times daily    Tinea corporis       glipiZIDE 5 MG tablet    GLUCOTROL    180 tablet    Take 1 tablet (5 mg) by mouth 2 times daily (before meals)    Type 2 diabetes mellitus with hyperglycemia, unspecified long term insulin use status (H)       lisinopril 10 MG tablet    PRINIVIL/ZESTRIL    90 tablet    Take 1 tablet (10 mg) by mouth daily    Benign essential hypertension       metFORMIN 500 MG 24 hr tablet    GLUCOPHAGE-XR    360 tablet    Take 1 tablet (500 mg) by mouth 2 times daily (with meals)    Type 2 diabetes mellitus with hyperglycemia, without long-term current use of insulin (H)       mupirocin 2 % ointment    BACTROBAN    30 g    Apply topically 3 times  daily    Skin breakdown       ONE TOUCH LANCETS Misc     100 each    Use to test blood sugars 3-4 times daily or as directed. (Please dispense LifeScan or preferred brand by insurance)    Type 2 diabetes mellitus without complication (H)       * Notice:  This list has 6 medication(s) that are the same as other medications prescribed for you. Read the directions carefully, and ask your doctor or other care provider to review them with you.

## 2017-09-11 NOTE — LETTER
September 15, 2017      Elie Martinez  [address]   [place] [place] [address]        Dear Elie,    Your 3 month blood sugar average has risen to 9.3%, above your goal of 8.5%.  Please take your Metformin 500mg twice daily and Glipizide 5mg twice daily about 20 minutes before you eat. Do not drink sweetened beverages, and avoid sweet snacks.  We will repeat this diabetes blood test in December of 2017.     Please see below for your test results.    Resulted Orders   Hemoglobin A1c ([facility])   Result Value Ref Range    Hemoglobin A1C 9.3 (H) 4.1 - 5.7 %       If you have any questions, please call the clinic to make an appointment.    Sincerely,    Jhon Gutierrez MD

## 2017-09-11 NOTE — NURSING NOTE
Provider to review meds.  Patient taking Metformin 500 mg three times a day, chart states twice daily. Taking Glipizide once a day, chart states BID.   ---MARIA ELENA Dorman

## 2017-09-11 NOTE — PATIENT INSTRUCTIONS
~ Stop Metamucil.     ~ Take Metformin 500 mg twice daily.     ~ Take your Glipizide 5 mg twice daily with food.     ~ Checked your 3 months blood sugar average test today. (A1c)    Follow up in December or January if blood test is normal.       Your medication list is printed, please keep this with you, it is helpful to bring this current list to any other medical appointments, the emergency room or hospital.    If you had lab testing today and your results are reassuring or normal they will be be mailed to you within 7 days.     If the lab tests need quick action we will call you with the results.   The phone number we will call with results is # [phone] (home) . If this is not the best number please call our clinic and change the number.    If you need any refills please call your pharmacy and they will contact us.    If you have any further concerns or wish to schedule another appointment you must call our office during normal business hours  [phone] (8-5:00 M-F)  If you have urgent medical questions that cannot wait  you may also call [phone] at any time of day.  If you have a medical emergency please call [phone].    Thank you for coming to [facility].

## 2017-09-15 NOTE — PROGRESS NOTES
Please call with results and recommendations AND send letter with results and recommendations.    Your 3 month blood sugar average has risen to 9.3%, above your goal of 8.5%.  Please take your Metformin 500mg twice daily and Glipizide 5mg twice daily about 20 minutes before you eat. Do not drink sweetened beverages, and avoid sweet snacks.  We will repeat this diabetes blood test in December of 2017.

## 2017-10-09 ENCOUNTER — TELEPHONE (OUTPATIENT)
Dept: FAMILY MEDICINE | Facility: CLINIC | Age: 82
End: 2017-10-09

## 2017-10-09 DIAGNOSIS — E11.65 TYPE 2 DIABETES MELLITUS WITH HYPERGLYCEMIA, UNSPECIFIED LONG TERM INSULIN USE STATUS: ICD-10-CM

## 2017-10-09 DIAGNOSIS — I67.9 CEREBRAL VASCULAR DISEASE: ICD-10-CM

## 2017-10-09 NOTE — TELEPHONE ENCOUNTER
[facility] phone call message- patient requesting a refill:    Full Medication Name: Glipizide 5 mg and Atorvastatin 40mg    Dose:     Pharmacy confirmed as   [id number] - [place], [place] - [address]  [address] [place] [id number]  Phone: [phone] Fax: [phone]    [facility] - [place], [place] - [address] [place] [address]  Phone: [phone] Fax: [phone]  : Yes    Additional Comments:      OK to leave a message on voice mail? Yes    Primary language: English      needed? No    Call taken on October 9, 2017 at 9:18 AM by Adriana Shea

## 2017-10-12 RX ORDER — GLIPIZIDE 5 MG/1
5 TABLET ORAL
Qty: 180 TABLET | Refills: 1 | Status: SHIPPED | OUTPATIENT
Start: 2017-10-12 | End: 2018-01-08

## 2017-10-12 RX ORDER — ATORVASTATIN CALCIUM 40 MG/1
40 TABLET, FILM COATED ORAL DAILY
Qty: 90 TABLET | Refills: 3 | Status: SHIPPED | OUTPATIENT
Start: 2017-10-12 | End: 2018-04-09

## 2017-10-12 NOTE — TELEPHONE ENCOUNTER
Patient is calling to check on Rx below, he states he went to the pharmacy and it is not ready so he is calling to check on Rx. He states he will be out tomorrow. Please call and advise.

## 2017-10-16 ENCOUNTER — TRANSFERRED RECORDS (OUTPATIENT)
Dept: HEALTH INFORMATION MANAGEMENT | Facility: CLINIC | Age: 82
End: 2017-10-16

## 2017-10-23 ENCOUNTER — TRANSFERRED RECORDS (OUTPATIENT)
Dept: HEALTH INFORMATION MANAGEMENT | Facility: CLINIC | Age: 82
End: 2017-10-23

## 2017-11-01 ENCOUNTER — TRANSFERRED RECORDS (OUTPATIENT)
Dept: HEALTH INFORMATION MANAGEMENT | Facility: CLINIC | Age: 82
End: 2017-11-01

## 2017-11-01 ENCOUNTER — AMBULATORY - HEALTHEAST (OUTPATIENT)
Dept: PODIATRY | Age: 82
End: 2017-11-01

## 2017-11-01 DIAGNOSIS — M79.673 PAIN OF FOOT, UNSPECIFIED LATERALITY: ICD-10-CM

## 2017-11-01 DIAGNOSIS — E11.9 TYPE 2 DIABETES MELLITUS WITHOUT COMPLICATION, WITHOUT LONG-TERM CURRENT USE OF INSULIN (H): ICD-10-CM

## 2017-11-01 DIAGNOSIS — L60.2 ONYCHAUXIS: ICD-10-CM

## 2017-11-01 ASSESSMENT — MIFFLIN-ST. JEOR: SCORE: 1705.91

## 2018-01-08 ENCOUNTER — TELEPHONE (OUTPATIENT)
Dept: FAMILY MEDICINE | Facility: CLINIC | Age: 83
End: 2018-01-08

## 2018-01-08 DIAGNOSIS — E11.65 TYPE 2 DIABETES MELLITUS WITH HYPERGLYCEMIA, UNSPECIFIED LONG TERM INSULIN USE STATUS: ICD-10-CM

## 2018-01-08 RX ORDER — GLIPIZIDE 5 MG/1
5 TABLET ORAL
Qty: 180 TABLET | Refills: 1 | Status: SHIPPED | OUTPATIENT
Start: 2018-01-08 | End: 2018-06-25

## 2018-01-08 NOTE — TELEPHONE ENCOUNTER
[facility] phone call message- medication clarification/question:    Full Medication Name: glipiZIDE (GLUCOTROL) 5 MG tablet    Question: Pt needs a refill.     Pharmacy confirmed as    [facility] - [place], [place] - [address] [place] [address] ROAD: Yes    OK to leave a message on voice mail? Yes    Primary language: English      needed? No    Call taken on January 8, 2018 at 9:08 AM by Kayla Triplett

## 2018-01-08 NOTE — TELEPHONE ENCOUNTER
Glipizide refill sent to pharmacy.     Last A1c 9.5%. Last Visit 9/11/17.  Due for f/u MD visit.      Routed to [facility] staff to organize MD appt for sometime in the next month or so.    DAISHA Gutierrez MD

## 2018-01-08 NOTE — TELEPHONE ENCOUNTER
Called patient, left detailed message on pt voicemail letting him know that he is due for a diabetes follow-up and to call back to schedule an appointment. I explain that he can ask for myself or schedule with one of the ladies who answers the phone.

## 2018-01-29 ENCOUNTER — RECORDS - HEALTHEAST (OUTPATIENT)
Dept: ADMINISTRATIVE | Facility: OTHER | Age: 83
End: 2018-01-29

## 2018-01-29 ENCOUNTER — OFFICE VISIT (OUTPATIENT)
Dept: FAMILY MEDICINE | Facility: CLINIC | Age: 83
End: 2018-01-29
Payer: MEDICARE

## 2018-01-29 VITALS
DIASTOLIC BLOOD PRESSURE: 77 MMHG | SYSTOLIC BLOOD PRESSURE: 127 MMHG | TEMPERATURE: 98 F | WEIGHT: 214 LBS | OXYGEN SATURATION: 98 % | HEART RATE: 75 BPM | BODY MASS INDEX: 29.02 KG/M2

## 2018-01-29 DIAGNOSIS — E11.65 TYPE 2 DIABETES MELLITUS WITH HYPERGLYCEMIA, WITHOUT LONG-TERM CURRENT USE OF INSULIN (H): Primary | ICD-10-CM

## 2018-01-29 DIAGNOSIS — N18.2 CKD (CHRONIC KIDNEY DISEASE) STAGE 2, GFR 60-89 ML/MIN: ICD-10-CM

## 2018-01-29 DIAGNOSIS — I10 ESSENTIAL HYPERTENSION: ICD-10-CM

## 2018-01-29 DIAGNOSIS — I67.9 CEREBRAL VASCULAR DISEASE: ICD-10-CM

## 2018-01-29 LAB
BUN SERPL-MCNC: 19 MG/DL (ref 7–30)
CALCIUM SERPL-MCNC: 9.6 MG/DL (ref 8.5–10.4)
CHLORIDE SERPLBLD-SCNC: 97 MMOL/L (ref 94–109)
CO2 SERPL-SCNC: 23 MMOL/L (ref 20–32)
CREAT SERPL-MCNC: 1.2 MG/DL (ref 0.8–1.5)
CREAT UR-MCNC: 98.9 MG/DL
EGFR CALCULATED (BLACK REFERENCE): 74 ML/MIN
EGFR CALCULATED (NON BLACK REFERENCE): 61.2 ML/MIN
GLUCOSE SERPL-MCNC: 371 MG/DL (ref 60–109)
HBA1C MFR BLD: 12.8 % (ref 4.1–5.7)
MICROALBUMIN UR-MCNC: 2.9 MG/DL (ref 0–1.99)
MICROALBUMIN/CREAT UR: 29.3 MG/G
POTASSIUM SERPL-SCNC: 5 MMOL/L (ref 3.4–5.3)
SODIUM SERPL-SCNC: 139 MMOL/L (ref 133–144)

## 2018-01-29 NOTE — PROGRESS NOTES
Please send result letter    As we discussed by phone, your kidney function is normal.    Your 3 month blood sugar average is very high.  We have a number of medication options to better control your blood sugars.  As we discussed, it will be helpful to have your daughter Raghu with us at a clinic visit to discuss these options and ensure we have a good way to monitor your blood sugars at home with these medication changes.  I will see you at about 3pm on Monday 2/5/18.

## 2018-01-29 NOTE — PATIENT INSTRUCTIONS
Schedule your eye appointment at your earliest convenience.   Will call your with your lab results.     +++++++++++++++++++++++++++++++++++++++++++++++++++++++++++++++++++++++  Your medication list is printed, please keep this with you, it is helpful to bring this current list to any other medical appointments, the emergency room or hospital.    If you had lab testing today and your results are reassuring or normal they will be be mailed to you within 7 days.     If the lab tests need quick action we will call you with the results.   The phone number we will call with results is # [phone] (home) . If this is not the best number please call our clinic and change the number.    If you need any refills please call your pharmacy and they will contact us.    If you have any further concerns or wish to schedule another appointment you must call our office during normal business hours  [phone] (8-5:00 M-F)  If you have urgent medical questions that cannot wait  you may also call [phone] at any time of day.  If you have a medical emergency please call [phone].    Thank you for coming to [facility].

## 2018-01-29 NOTE — NURSING NOTE
Eye appt near due and patient aware and will schedule his appointment.   ~ Bobby ORTIZ CMA ([place])

## 2018-01-29 NOTE — PROGRESS NOTES
SUBJECTIVE:  This is an 85-year-old male with type 2 diabetes, cerebrovascular disease status post carotid endarterectomy, hypertension and stage II chronic kidney disease and prostate cancer status post treatment.   As far as his diabetes, he does not often check his blood glucoses.  He admits that over the past few months his children have been taking him out to eat often and he has not been careful about what he has been eating.  When I last saw him in September he had some improvement in loose stools after reducing his metformin from 500 mg 3 times a day to 500 mg twice a day.  He is using Glipizide twice daily before meals.  He denies any hypoglycemic symptoms and has never had readings less than 90 that he can recall.  He follows with [facility] twice a year.  He denies any foot wounds or paresthesias.  He denies polydipsia, no change in his urination.   Next, as far as his hypertension he continues on lisinopril each day.  Denies any dizziness, lightheadedness or orthostatic symptoms.   SOCIAL HISTORY:  His wife, La passed away in 05/2017.  He feels very supported by his family and he denies any depressive symptoms.  He is using a pillbox for his medications.  He continues to work with the [employer] and which keeps him busy.   OBJECTIVE:   VITAL SIGNS:  As above with his blood pressure at goal.   GENERAL:  He is alert, no distress, relates his own history and names his medications for diabetes but needs some prompting to name his other medications.   HEENT:  Head is normocephalic and atraumatic.  Eyes, sclerae are nonicteric, noninjected.   CARDIOVASCULAR:  Regular rate and rhythm without murmur.   LUNGS:  Clear with fair air exchange bilaterally.   ABDOMEN:  Soft and nontender.   EXTREMITIES:  With trace bilateral peripheral edema.  Examination of his feet shows heavy callusing on the plantar aspect of both feet.  He has no skin wounds.  He has no sensation to monofilament exam  throughout both feet.   ASSESSMENT AND PLAN:   1.  Type 2 diabetes:  His A1c is pending.  I anticipate some improvement with improvement in his dietary habits.  He is open to reducing large carbohydrate filled breakfast.  His goal A1c is 8.5%, in light of his age and coexisting medical conditions.  He will continue to use a pillbox.  He has declined home care nurse to help with medications.  His metformin dose is limited by loose stools at higher doses.   2.  Hypertension:  Well controlled.  Basic metabolic profile is pending today.   3.  Stage II chronic kidney disease.  His GFR in 05/2017 was 61.   4.  Cerebrovascular disease, status post carotid endarterectomy:  No new symptoms.   5.  History of prostate cancer:  He follows with a urologist.  His last PSA, he reports was 0.8.

## 2018-01-29 NOTE — MR AVS SNAPSHOT
After Visit Summary   1/29/2018    Elie Martinez    MRN: [id number]           Patient Information     Date Of Birth          9/8/1932        Visit Information        Provider Department      1/29/2018 10:00 AM Jhon Gutierrez MD [facility]        Today's Diagnoses     Type 2 diabetes mellitus with hyperglycemia, without long-term current use of insulin (H)    -  1    CKD (chronic kidney disease) stage 2, GFR 60-89 ml/min        Essential hypertension        Cerebral vascular disease          Care Instructions    Schedule your eye appointment at your earliest convenience.   Will call your with your lab results.     +++++++++++++++++++++++++++++++++++++++++++++++++++++++++++++++++++++++  Your medication list is printed, please keep this with you, it is helpful to bring this current list to any other medical appointments, the emergency room or hospital.    If you had lab testing today and your results are reassuring or normal they will be be mailed to you within 7 days.     If the lab tests need quick action we will call you with the results.   The phone number we will call with results is # [phone] (home) . If this is not the best number please call our clinic and change the number.    If you need any refills please call your pharmacy and they will contact us.    If you have any further concerns or wish to schedule another appointment you must call our office during normal business hours  [phone] (8-5:00 M-F)  If you have urgent medical questions that cannot wait  you may also call [phone] at any time of day.  If you have a medical emergency please call [phone].    Thank you for coming to [facility].            Follow-ups after your visit        Who to contact     Please call your clinic at [phone] to:    Ask questions about your health    Make or cancel appointments    Discuss your medicines    Learn about your test results    Speak to your doctor   If you have  compliments or concerns about an experience at your clinic, or if you wish to file a complaint, please contact [facility] Patient Relations at [phone] or email us at [email or web address]         Additional Information About Your Visit        [email or web address].[employer] Information     RedCritter is an electronic gateway that provides easy, online access to your medical records. With RedCritter, you can request a clinic appointment, read your test results, renew a prescription or communicate with your care team.     To sign up for RedCritter visit the website at [email or web address]   You will be asked to enter the access code listed below, as well as some personal information. Please follow the directions to create your username and password.     Your access code is: [email or web address]  Expires: 2018 10:26 AM     Your access code will  in 90 days. If you need help or a new code, please contact your [facility] or call [phone] for assistance.        Care EveryWhere ID     This is your Care EveryWhere ID. This could be used by other organizations to access your [place] medical records  [id number]        Your Vitals Were     Pulse Temperature Pulse Oximetry BMI (Body Mass Index)          75 98  F (36.7  C) (Oral) 98% 29.02 kg/m2         Blood Pressure from Last 3 Encounters:   18 127/77   17 132/73   17 139/85    Weight from Last 3 Encounters:   18 214 lb (97.1 kg)   17 217 lb (98.4 kg)   17 217 lb 3.2 oz (98.5 kg)              We Performed the Following     Basic Metabolic Panel ([facility]) - Results < 1 hr     Hemoglobin A1c ([facility])     Microalbumin Random Ur ([facility])          Today's Medication Changes          These changes are accurate as of 18 10:27 AM.  If you have any questions, ask your nurse or doctor.               These medicines have changed or have updated prescriptions.        Dose/Directions     blood glucose monitoring test strip   Commonly known as:  ONETOUCH TEST STRIPS   This may have changed:  Another medication with the same name was removed. Continue taking this medication, and follow the directions you see here.   Used for:  Type 2 diabetes mellitus without complication (H)   Changed by:  Jhon Gutierrez MD        Use to test blood sugar 4 times daily or as directed. (Please dispense LifeScan or preferred brand by insurance)   Quantity:  1 Box   Refills:  11       ONETOUCH BASIC SYSTEM W/DEVICE Kit   This may have changed:  Another medication with the same name was removed. Continue taking this medication, and follow the directions you see here.   Used for:  Type 2 diabetes mellitus without complication (H)   Changed by:  Jhon Gutierrez MD        Use to test blood sugar 3-4 times daily or as directed. (Please dispense LifeScan or preferred brand by insurance)   Quantity:  1 kit   Refills:  11         Stop taking these medicines if you haven't already. Please contact your care team if you have questions.     clotrimazole-betamethasone cream   Commonly known as:  LOTRISONE   Stopped by:  Jhon Gutierrez MD           mupirocin 2 % ointment   Commonly known as:  BACTROBAN   Stopped by:  Jhon Gutierrez MD                    Primary Care Provider Office Phone # Fax #    Jhon Gutierrez [phone]       [facility] [address] [place] [address]        Equal Access to Services     [facility]: Hadii aad ku hadasho Soomaali, waaxda luqadaha, qaybta kaalmada adeegyada, waxay idiin hayarleen denise . So [facility] [phone].    ATENCIÓN: Si habla español, tiene a ponce disposición servicios gratuitos de asistencia lingüística. Llame al [phone].    We comply with applicable federal civil rights laws and [place] laws. We do not discriminate on the basis of race, color, national origin, age, disability, sex, sexual orientation, or gender identity.            Thank  you!     Thank you for choosing [facility]  for your care. Our goal is always to provide you with excellent care. Hearing back from our patients is one way we can continue to improve our services. Please take a few minutes to complete the written survey that you may receive in the mail after your visit with us. Thank you!             Your Updated Medication List - Protect others around you: Learn how to safely use, store and throw away your medicines at www.disposemymeds.org.          This list is accurate as of 1/29/18 10:27 AM.  Always use your most recent med list.                   Brand Name Dispense Instructions for use Diagnosis    aspirin 325 MG tablet     90 tablet    Take 1 tablet (325 mg) by mouth daily    Cerebral vascular disease       atorvastatin 40 MG tablet    LIPITOR    90 tablet    Take 1 tablet (40 mg) by mouth daily    Cerebral vascular disease       blood glucose monitoring test strip    ONETOUCH TEST STRIPS    1 Box    Use to test blood sugar 4 times daily or as directed. (Please dispense LifeScan or preferred brand by insurance)    Type 2 diabetes mellitus without complication (H)       glipiZIDE 5 MG tablet    GLUCOTROL    180 tablet    Take 1 tablet (5 mg) by mouth 2 times daily (before meals)    Type 2 diabetes mellitus with hyperglycemia, unspecified long term insulin use status (H)       lisinopril 10 MG tablet    PRINIVIL/ZESTRIL    90 tablet    Take 1 tablet (10 mg) by mouth daily    Benign essential hypertension       metFORMIN 500 MG 24 hr tablet    GLUCOPHAGE-XR    360 tablet    Take 1 tablet (500 mg) by mouth 2 times daily (with meals)    Type 2 diabetes mellitus with hyperglycemia, without long-term current use of insulin (H)       ONETOUCH BASIC SYSTEM W/DEVICE Kit     1 kit    Use to test blood sugar 3-4 times daily or as directed. (Please dispense LifeScan or preferred brand by insurance)    Type 2 diabetes mellitus without complication (H)       ONETOUCH LANCETS  Misc     100 each    Use to test blood sugars 3-4 times daily or as directed. (Please dispense LifeScan or preferred brand by insurance)    Type 2 diabetes mellitus without complication (H)

## 2018-01-29 NOTE — LETTER
January 29, 2018      Elie Martinez  [address]   [place] [address]        Dear Elie,    As we discussed by phone, your kidney function is normal.     Your 3 month blood sugar average is very high.  We have a number of medication options to better control your blood sugars.  As we discussed, it will be helpful to have your daughter Raghu with us at a clinic visit to discuss these options and ensure we have a good way to monitor your blood sugars at home with these medication changes.  I will see you at about 3pm on Monday 2/5/18.     Please see below for your test results.    Resulted Orders   Basic Metabolic Panel ([facility]) - Results < 1 hr   Result Value Ref Range    Glucose 371.0 (H) 60.0 - 109.0 mg/dL    Urea Nitrogen 19.0 7.0 - 30.0 mg/dL    Creatinine 1.2 0.8 - 1.5 mg/dL    Sodium 139.0 133.0 - 144.0 mmol/L    Potassium 5.0 3.4 - 5.3 mmol/L    Chloride 97.0 94.0 - 109.0 mmol/L    Carbon Dioxide 23.0 20.0 - 32.0 mmol/L    Calcium 9.6 8.5 - 10.4 mg/dL    eGFR Calculated (Non Black Reference) 61.2 >60.0 mL/min    eGFR Calculated (Black Reference) 74.0 >60.0 mL/min   Hemoglobin A1c ([facility])   Result Value Ref Range    Hemoglobin A1C 12.8 (H) 4.1 - 5.7 %       If you have any questions, please call the clinic to make an appointment.    Sincerely,    Jhon Gutierrez MD

## 2018-01-29 NOTE — LETTER
January 31, 2018      Elie Martinez  [address]   [place] [address]        Dear Elie,    Your urine protein test is unchanged compared to one year ago.  It shows that your diabetes is putting some strain on your kidneys.  Avoid taking ibuprofen, advil, naproxen or other NSAID pain relievers which can be hard on the kidneys.  We will talk more about this when I see you in the clinic next week.    Please see below for your test results.    Resulted Orders   Basic Metabolic Panel ([facility]) - Results < 1 hr   Result Value Ref Range    Glucose 371.0 (H) 60.0 - 109.0 mg/dL    Urea Nitrogen 19.0 7.0 - 30.0 mg/dL    Creatinine 1.2 0.8 - 1.5 mg/dL    Sodium 139.0 133.0 - 144.0 mmol/L    Potassium 5.0 3.4 - 5.3 mmol/L    Chloride 97.0 94.0 - 109.0 mmol/L    Carbon Dioxide 23.0 20.0 - 32.0 mmol/L    Calcium 9.6 8.5 - 10.4 mg/dL    eGFR Calculated (Non Black Reference) 61.2 >60.0 mL/min    eGFR Calculated (Black Reference) 74.0 >60.0 mL/min   Hemoglobin A1c ([facility])   Result Value Ref Range    Hemoglobin A1C 12.8 (H) 4.1 - 5.7 %   Microalbumin Random Ur ([facility])   Result Value Ref Range    Microalbumin, Urine 2.90 (H) 0.00 - 1.99 mg/dL    Creatinine, Urine 98.9 mg/dL    Albumin Urine mg/g Cr 29.3 (H) <=19.9 mg/g    Narrative    Test performed by:  [facility]  [address] [place] [address]  Microalbumin, Random Urine  <2.0 mg/dL . . . . . . . . Normal  3.0-30.0 mg/dL . . . . . . Microalbuminuria  >30.0 mg/dL . . . . . .  . Clinical Proteinuria  Microalbumin/Creatinine Ratio, Random Urine  <20 mg/g . . . . .. . . . Normal   mg/g . . . . . . . Microalbuminuria  >300 mg/g . . . . . . . . Clinical Proteinuria       If you have any questions, please call the clinic to make an appointment.    Sincerely,    Jhon Gutierrez MD

## 2018-01-30 NOTE — PROGRESS NOTES
Please send result letter  Your urine protein test is unchanged compared to one year ago.  It shows that your diabetes is putting some strain on your kidneys.  Avoid taking ibuprofen, advil, naproxen or other NSAID pain relievers which can be hard on the kidneys.  We will talk more about this when I see you in the clinic next week.

## 2018-02-05 ENCOUNTER — OFFICE VISIT (OUTPATIENT)
Dept: FAMILY MEDICINE | Facility: CLINIC | Age: 83
End: 2018-02-05
Payer: MEDICARE

## 2018-02-05 ENCOUNTER — RECORDS - HEALTHEAST (OUTPATIENT)
Dept: ADMINISTRATIVE | Facility: OTHER | Age: 83
End: 2018-02-05

## 2018-02-05 VITALS
HEIGHT: 72 IN | HEART RATE: 74 BPM | WEIGHT: 214.8 LBS | SYSTOLIC BLOOD PRESSURE: 155 MMHG | TEMPERATURE: 98.3 F | BODY MASS INDEX: 29.09 KG/M2 | DIASTOLIC BLOOD PRESSURE: 77 MMHG | OXYGEN SATURATION: 98 %

## 2018-02-05 DIAGNOSIS — E11.65 TYPE 2 DIABETES MELLITUS WITH HYPERGLYCEMIA, WITHOUT LONG-TERM CURRENT USE OF INSULIN (H): ICD-10-CM

## 2018-02-05 DIAGNOSIS — N18.2 CHRONIC RENAL DISEASE, STAGE II: Primary | ICD-10-CM

## 2018-02-05 NOTE — PATIENT INSTRUCTIONS
- Keep checking blood sugars one time daily, preferably in the morning before you eat breakfast.  - Start taking Lantus insulin 10 units at night, before bed or in the morning.  - Goal fasting blood sugars (before you eat) is between 100-150 and after eating goal blood sugar in between 120-220.  - Follow-up in 1 month.    Your medication list is printed, please keep this with you, it is helpful to bring this current list to any other medical appointments, the emergency room or hospital.    If you had lab testing today and your results are reassuring or normal they will be be mailed to you within 7 days.     If the lab tests need quick action we will call you with the results.   The phone number we will call with results is # [phone] (home) . If this is not the best number please call our clinic and change the number.    If you need any refills please call your pharmacy and they will contact us.    If you have any further concerns or wish to schedule another appointment you must call our office during normal business hours  [phone] (8-5:00 M-F)  If you have urgent medical questions that cannot wait  you may also call [phone] at any time of day.  If you have a medical emergency please call [phone].    Thank you for coming to [facility].

## 2018-02-05 NOTE — MR AVS SNAPSHOT
After Visit Summary   2/5/2018    Elie Martinez    MRN: [id number]           Patient Information     Date Of Birth          9/8/1932        Visit Information        Provider Department      2/5/2018 3:00 PM Jhon Gutierrez MD [facility]        Today's Diagnoses     Chronic renal disease, stage II    -  1    Type 2 diabetes mellitus with hyperglycemia, without long-term current use of insulin (H)          Care Instructions    - Keep checking blood sugars one time daily, preferably in the morning before you eat breakfast.  - Start taking Lantus insulin 10 units at night, before bed or in the morning.  - Goal fasting blood sugars (before you eat) is between 100-150 and after eating goal blood sugar in between 120-220.  - Follow-up in 1 month.    Your medication list is printed, please keep this with you, it is helpful to bring this current list to any other medical appointments, the emergency room or hospital.    If you had lab testing today and your results are reassuring or normal they will be be mailed to you within 7 days.     If the lab tests need quick action we will call you with the results.   The phone number we will call with results is # [phone] (home) . If this is not the best number please call our clinic and change the number.    If you need any refills please call your pharmacy and they will contact us.    If you have any further concerns or wish to schedule another appointment you must call our office during normal business hours  [phone] (8-5:00 M-F)  If you have urgent medical questions that cannot wait  you may also call [phone] at any time of day.  If you have a medical emergency please call [phone].    Thank you for coming to [facility].            Follow-ups after your visit        Your next 10 appointments already scheduled     Mar 05, 2018  3:00 PM CST   Return Visit with Jhon Gutierrez MD   [facility] ([facility])    [address]  [place] [place] [address]   [phone]              Who to contact     Please call your clinic at [phone] to:    Ask questions about your health    Make or cancel appointments    Discuss your medicines    Learn about your test results    Speak to your doctor   If you have compliments or concerns about an experience at your clinic, or if you wish to file a complaint, please contact [facility] Patient Relations at [phone] or email us at [email or web address]         Additional Information About Your Visit        [employer] Information     [employer] is an electronic gateway that provides easy, online access to your medical records. With [employer], you can request a clinic appointment, read your test results, renew a prescription or communicate with your care team.     To sign up for [employer] visit the website at [email or web address]   You will be asked to enter the access code listed below, as well as some personal information. Please follow the directions to create your username and password.     Your access code is: [email or web address]  Expires: 2018 10:26 AM     Your access code will  in 90 days. If you need help or a new code, please contact your [facility] or call [phone] for assistance.        Care EveryWhere ID     This is your Care EveryWhere ID. This could be used by other organizations to access your [place] medical records  [id number]        Your Vitals Were     Pulse Temperature Height Pulse Oximetry BMI (Body Mass Index)       74 98.3  F (36.8  C) (Oral) 6' 0.25\" (183.5 cm) 98% 28.93 kg/m2        Blood Pressure from Last 3 Encounters:   18 155/77   18 127/77   17 132/73    Weight from Last 3 Encounters:   18 214 lb 12.8 oz (97.4 kg)   18 214 lb (97.1 kg)   17 217 lb (98.4 kg)              Today, you had the following     No orders found for display         Today's " Medication Changes          These changes are accurate as of 2/5/18  4:01 PM.  If you have any questions, ask your nurse or doctor.               Start taking these medicines.        Dose/Directions    insulin glargine 100 UNIT/ML injection   Commonly known as:  LANTUS SOLOSTAR   Used for:  Type 2 diabetes mellitus with hyperglycemia, without long-term current use of insulin (H)   Started by:  Jhon Gutierrez MD        Dose:  10 Units   Inject 10 Units Subcutaneous At Bedtime   Quantity:  3 mL   Refills:  0            Where to get your medicines      These medications were sent to [facility] - [address]  [address] [facility] [address]     Phone:  [phone]     insulin glargine 100 UNIT/ML injection                Primary Care Provider Office Phone # Fax #    Jhon Gutierrez [phone]       [facility] [address] [place] [address]        Equal Access to Services     [facility]: Hadii aad ku hadasho Soomaali, waaxda luqadaha, qaybta kaalmada adeegyada, waxay idiin hayaan adeeg kharash la'aan . So [facility] [phone].    ATENCIÓN: Si habla español, tiene a ponce disposición servicios gratuitos de asistencia lingüística. Gerson al [phone].    We comply with applicable federal civil rights laws and [place] laws. We do not discriminate on the basis of race, color, national origin, age, disability, sex, sexual orientation, or gender identity.            Thank you!     Thank you for choosing [facility]  for your care. Our goal is always to provide you with excellent care. Hearing back from our patients is one way we can continue to improve our services. Please take a few minutes to complete the written survey that you may receive in the mail after your visit with us. Thank you!             Your Updated Medication List - Protect others around you: Learn how to safely use, store and throw away your medicines at  www.disposemymeds.org.          This list is accurate as of 2/5/18  4:01 PM.  Always use your most recent med list.                   Brand Name Dispense Instructions for use Diagnosis    aspirin 325 MG tablet     90 tablet    Take 1 tablet (325 mg) by mouth daily    Cerebral vascular disease       atorvastatin 40 MG tablet    LIPITOR    90 tablet    Take 1 tablet (40 mg) by mouth daily    Cerebral vascular disease       blood glucose monitoring test strip    ONETOUCH TEST STRIPS    1 Box    Use to test blood sugar 4 times daily or as directed. (Please dispense LifeScan or preferred brand by insurance)    Type 2 diabetes mellitus without complication (H)       glipiZIDE 5 MG tablet    GLUCOTROL    180 tablet    Take 1 tablet (5 mg) by mouth 2 times daily (before meals)    Type 2 diabetes mellitus with hyperglycemia, unspecified long term insulin use status (H)       insulin glargine 100 UNIT/ML injection    LANTUS SOLOSTAR    3 mL    Inject 10 Units Subcutaneous At Bedtime    Type 2 diabetes mellitus with hyperglycemia, without long-term current use of insulin (H)       lisinopril 10 MG tablet    PRINIVIL/ZESTRIL    90 tablet    Take 1 tablet (10 mg) by mouth daily    Benign essential hypertension       metFORMIN 500 MG 24 hr tablet    GLUCOPHAGE-XR    360 tablet    Take 1 tablet (500 mg) by mouth 2 times daily (with meals)    Type 2 diabetes mellitus with hyperglycemia, without long-term current use of insulin (H)       ONETOUCH BASIC SYSTEM W/DEVICE Kit     1 kit    Use to test blood sugar 3-4 times daily or as directed. (Please dispense LifeScan or preferred brand by insurance)    Type 2 diabetes mellitus without complication (H)       ONETOUCH LANCETS Misc     100 each    Use to test blood sugars 3-4 times daily or as directed. (Please dispense LifeScan or preferred brand by insurance)    Type 2 diabetes mellitus without complication (H)

## 2018-02-06 NOTE — PROGRESS NOTES
SUBJECTIVE:  This is an 85-year-old male with type 2 diabetes, cerebrovascular disease status post carotid endarterectomy, hypertension and stage II chronic kidney disease.  At his last visit his A1C returned well above goal at 12.8%.  I discussed with both him and his daughter and his daughter is going to take a more active role in assisting with his diabetic management.  He brings in glycemic readings for the first time today showing fasting glucoses of 220-240 and postprandials of 200-320.  He denies any hypoglycemic symptoms or readings.  He continues to feel well.   REVIEW OF SYSTEMS:  No visual changes.  No change in his urination.  No skin wounds or paresthesias.   OBJECTIVE:   VITAL SIGNS:  As above with his blood pressure above goal.   GENERAL:  He is alert and in no distress.   HEENT:  Head is normocephalic and atraumatic.   CARDIOVASCULAR:  Regular rate and rhythm.   LUNGS:  Clear with fair air exchange.   ABDOMEN:  Soft and nontender.   EXTREMITIES:  With trace bilateral edema.   ASSESSMENT AND PLAN:     1.  Uncontrolled type 2 diabetes:  Last A1c greater than 12%.  We will initiate long-acting insulin at 10 units a day.  He will continue metformin 500 mg twice a day (dose is limited by GI upset).  Continue glipizide 5 mg twice a day.  Will titrate to a fasting goal 100-150 postprandial goal less than 220.   2.  Hypertension:  His blood pressure is well controlled at last visit continue current medication regimen.   3.  Stage II chronic kidney disease.   4.  Cerebrovascular disease:  Continue secondary prevention.   5.  History of prostate cancer, continue to follow with Urology.

## 2018-02-13 ENCOUNTER — TELEPHONE (OUTPATIENT)
Dept: FAMILY MEDICINE | Facility: CLINIC | Age: 83
End: 2018-02-13

## 2018-02-13 DIAGNOSIS — E11.22 TYPE 2 DIABETES MELLITUS WITH STAGE 2 CHRONIC KIDNEY DISEASE, WITH LONG-TERM CURRENT USE OF INSULIN (H): Primary | ICD-10-CM

## 2018-02-13 DIAGNOSIS — N18.2 TYPE 2 DIABETES MELLITUS WITH STAGE 2 CHRONIC KIDNEY DISEASE, WITH LONG-TERM CURRENT USE OF INSULIN (H): Primary | ICD-10-CM

## 2018-02-13 DIAGNOSIS — Z79.4 TYPE 2 DIABETES MELLITUS WITH STAGE 2 CHRONIC KIDNEY DISEASE, WITH LONG-TERM CURRENT USE OF INSULIN (H): Primary | ICD-10-CM

## 2018-02-13 NOTE — TELEPHONE ENCOUNTER
Elie santiago pen needle rx has been faxed to [facility]. He confirms with also informing me he has already received a call from [facility]. Denis PARNELL

## 2018-02-13 NOTE — TELEPHONE ENCOUNTER
[facility] phone call message- medication clarification/question:    Full Medication Name: Needles for insulin     Question: Pt is calling in stating he didn't get any needles for the insulin he's supposed to be using. Also wants to know if this has to go through [facility] also? If not [place] is ok to send them to. Would like a call back when it's sent.      Pharmacy confirmed as      [id number] - [place] [address] [address] AT [facility] [address]: Yes    OK to leave a message on voice mail? Yes    Primary language: English      needed? No    Call taken on February 13, 2018 at 9:20 AM by Luz Marina Stover

## 2018-02-13 NOTE — TELEPHONE ENCOUNTER
Dr Gutierrez is not in clinic. Will route this request to NICHOLAS-Dr Zhang to review and complete. Pen needles have been populated for Dr Zhang to review. Denis PARNELL

## 2018-02-20 DIAGNOSIS — I10 BENIGN ESSENTIAL HYPERTENSION: ICD-10-CM

## 2018-02-20 RX ORDER — LISINOPRIL 10 MG/1
10 TABLET ORAL DAILY
Qty: 90 TABLET | Refills: 1 | Status: SHIPPED | OUTPATIENT
Start: 2018-02-20 | End: 2018-08-13

## 2018-03-12 ENCOUNTER — OFFICE VISIT (OUTPATIENT)
Dept: FAMILY MEDICINE | Facility: CLINIC | Age: 83
End: 2018-03-12
Payer: MEDICARE

## 2018-03-12 VITALS
TEMPERATURE: 97.6 F | SYSTOLIC BLOOD PRESSURE: 128 MMHG | HEART RATE: 72 BPM | DIASTOLIC BLOOD PRESSURE: 76 MMHG | WEIGHT: 214 LBS | BODY MASS INDEX: 28.82 KG/M2 | OXYGEN SATURATION: 98 %

## 2018-03-12 DIAGNOSIS — N18.2 CHRONIC RENAL DISEASE, STAGE II: ICD-10-CM

## 2018-03-12 DIAGNOSIS — E11.65 TYPE 2 DIABETES MELLITUS WITH HYPERGLYCEMIA, WITHOUT LONG-TERM CURRENT USE OF INSULIN (H): ICD-10-CM

## 2018-03-12 DIAGNOSIS — I10 ESSENTIAL HYPERTENSION: Primary | ICD-10-CM

## 2018-03-12 RX ORDER — METFORMIN HCL 500 MG
500 TABLET, EXTENDED RELEASE 24 HR ORAL
Qty: 90 TABLET | Refills: 3
Start: 2018-03-12 | End: 2018-05-16

## 2018-03-12 NOTE — PROGRESS NOTES
SUBJECTIVE: This is an 85-year-old male who presents with multiple issues.     First, as far as his type 2 diabetes he started Lantus insulin 10 units about 1 month ago.  He has noticed some decrease in his glucoses.  His fasting glucoses continue to be between 160-200 and postprandial 164-299 mainly in the 260sS.  He has a glucose log showing regular twice daily glucose monitoring.  He has again developed loose stools which in the past has improved with the reduction in dose of metformin.  He is currently taking metformin extended release twice daily.     As far as his hypertension he is taking lisinopril 10 mg a day without dizziness, lightheadedness, cough.   REVIEW OF SYSTEMS:  No abdominal pain, no blood in stool or black stools.  His loose stools are difficult to control and he has had some stool incontinence as he has had before during loose stool bouts.  No nausea or vomiting.  The remainder of the review of systems is outlined above.   OBJECTIVE:   VITAL SIGNS:  As above with his blood pressure at goal.   GENERAL:  He is alert, in no distress, relates his own history.  His daughter is also here.   HEENT:  Head is normocephalic and atraumatic.  Eyes, sclerae are nonicteric, not injected.  Moist mucous membranes.   CARDIOVASCULAR:  Heart tones are quiet and regular.   LUNGS:  Clear with fair air exchange bilaterally.   ABDOMEN:  Soft and nontender throughout.   EXTREMITIES:  Warm.   ASSESSMENT AND PLAN:   1.  Type 2 diabetes, poorly controlled:  Increased Lantus insulin from 10 to 15 units each day.  Will decrease metformin extended release down to 500 mg once daily due to likely side effect of loose stools.  He will call next week with his glucose readings and I will titrate his Lantus insulin dose to a goal fasting level of 120-160.  He will continue glipizide as outlined in the medication list.   2.  Hypertension:  Well controlled.  Continue lisinopril.   3.  Stage II chronic kidney disease:  Repeat a  basic metabolic profile no later than May 2018, baseline GFR of about 61.   4.  Cerebrovascular disease:  He is status post carotid endarterectomy, continue secondary prevention.   5.  History of prostate cancer:  He follows with a urologist.

## 2018-03-12 NOTE — PATIENT INSTRUCTIONS
~ Increase your Lantus to 15 units starting tonight.     ~ Call Dr. Gutierrez on 3/19/18 next week with 1 week of glucose readings. Check sugars twice a day.     ~  Metformin to 1 tablet once a day to see if this will help your loose stools.  Call Dr. Gutierrez with update when you call with your sugar readings.     =======================================================================  Your medication list is printed, please keep this with you, it is helpful to bring this current list to any other medical appointments, the emergency room or hospital.    If you had lab testing today and your results are reassuring or normal they will be be mailed to you within 7 days.     If the lab tests need quick action we will call you with the results.   The phone number we will call with results is # [phone] (home) . If this is not the best number please call our clinic and change the number.    If you need any refills please call your pharmacy and they will contact us.    If you have any further concerns or wish to schedule another appointment you must call our office during normal business hours  [phone] (8-5:00 M-F)  If you have urgent medical questions that cannot wait  you may also call [phone] at any time of day.  If you have a medical emergency please call [phone].    Thank you for coming to [facility].

## 2018-03-12 NOTE — MR AVS SNAPSHOT
After Visit Summary   3/12/2018    Elie Martinez    MRN: [id number]           Patient Information     Date Of Birth          1932        Visit Information        Provider Department      3/12/2018 9:40 AM Jhon Gutierrez MD [facility]        Today's Diagnoses     Essential hypertension    -  1    Type 2 diabetes mellitus with hyperglycemia, without long-term current use of insulin (H)        Chronic renal disease, stage II          Care Instructions    ~ Increase your Lantus to 15 units starting tonight.     ~ Call Dr. Gutierrez on 3/19/18 next week with 1 week of glucose readings. Check sugars twice a day.     ~  Metformin to 1 tablet once a day to see if this will help your loose stools.  Call Dr. Gutierrez with update when you call with your sugar readings.     =======================================================================  Your medication list is printed, please keep this with you, it is helpful to bring this current list to any other medical appointments, the emergency room or hospital.    If you had lab testing today and your results are reassuring or normal they will be be mailed to you within 7 days.     If the lab tests need quick action we will call you with the results.   The phone number we will call with results is # [phone] (home) . If this is not the best number please call our clinic and change the number.    If you need any refills please call your pharmacy and they will contact us.    If you have any further concerns or wish to schedule another appointment you must call our office during normal business hours  [phone] (8-5:00 M-F)  If you have urgent medical questions that cannot wait  you may also call [phone] at any time of day.  If you have a medical emergency please call 211.    Thank you for coming to [facility].            Follow-ups after your visit        Your next 10 appointments already scheduled     2018  9:20 AM CDT    65+ Annual Wellness with Rosa Shaferp Vicki   [facility] ([facility])    [address] [place] [address]   [phone]            2018  9:40 AM CDT   65+ Annual Wellness with Jhon Gutierrez MD   [facility] ([facility])    [address] [place] [address]   [phone]              Who to contact     Please call your clinic at [phone] to:    Ask questions about your health    Make or cancel appointments    Discuss your medicines    Learn about your test results    Speak to your doctor            Additional Information About Your Visit        MyChart Information     Everfit is an electronic gateway that provides easy, online access to your medical records. With Flux, you can request a clinic appointment, read your test results, renew a prescription or communicate with your care team.     To sign up for Everfit visit the website at [email or web address]   You will be asked to enter the access code listed below, as well as some personal information. Please follow the directions to create your username and password.     Your access code is: [email or web address]  Expires: 2018 11:26 AM     Your access code will  in 90 days. If you need help or a new code, please contact your [facility] or call [phone] for assistance.        Care EveryWhere ID     This is your Care EveryWhere ID. This could be used by other organizations to access your [place] medical records  [id number]        Your Vitals Were     Pulse Temperature Pulse Oximetry BMI (Body Mass Index)          72 97.6  F (36.4  C) 98% 28.82 kg/m2         Blood Pressure from Last 3 Encounters:   18 128/76   18 155/77   18 127/77    Weight from Last 3 Encounters:   18 214 lb (97.1 kg)   18 214 lb 12.8 oz (97.4 kg)   18 214 lb (97.1 kg)              Today, you had the following     No orders found for display         Today's  Medication Changes          These changes are accurate as of 3/12/18 10:31 AM.  If you have any questions, ask your nurse or doctor.               These medicines have changed or have updated prescriptions.        Dose/Directions    blood glucose monitoring test strip   Commonly known as:  ONETOUCH TEST STRIPS   This may have changed:  additional instructions   Changed by:  Jhon Gutierrez MD        Use to test blood sugar 4 times daily or as directed. (Please dispense preferred brand by insurance)   Quantity:  1 Box   Refills:  11       insulin glargine 100 UNIT/ML injection   Commonly known as:  LANTUS SOLOSTAR   This may have changed:  how much to take   Used for:  Type 2 diabetes mellitus with hyperglycemia, without long-term current use of insulin (H)   Changed by:  Jhon Gutierrez MD        Dose:  15 Units   Inject 15 Units Subcutaneous At Bedtime   Quantity:  3 mL   Refills:  0       metFORMIN 500 MG 24 hr tablet   Commonly known as:  GLUCOPHAGE-XR   This may have changed:  when to take this   Used for:  Type 2 diabetes mellitus with hyperglycemia, without long-term current use of insulin (H)   Changed by:  Jhon Gutierrez MD        Dose:  500 mg   Take 1 tablet (500 mg) by mouth every morning (before breakfast)   Quantity:  90 tablet   Refills:  3            Where to get your medicines      These medications were sent to [facility] [address] [address]     Phone:  [phone]     blood glucose monitoring test strip         Some of these will need a paper prescription and others can be bought over the counter.  Ask your nurse if you have questions.     You don't need a prescription for these medications     insulin glargine 100 UNIT/ML injection    metFORMIN 500 MG 24 hr tablet                Primary Care Provider Office Phone # Fax #    Jhon Gutierrez -[phone]       [facility] [address]  [place] [address]        Equal Access to Services     [facility] : Hadii tristian garrett tucker Alvarado, waaxda luqadaha, qaybta kaalmada marge, carlos hernandezglenismisty hernandez. So [facility] [phone].    ATENCIÓN: Si habla español, tiene a ponce disposición servicios gratuitos de asistencia lingüística. Gerson al [phone].    We comply with applicable federal civil rights laws and [place] laws. We do not discriminate on the basis of race, color, national origin, age, disability, sex, sexual orientation, or gender identity.            Thank you!     Thank you for choosing [facility]  for your care. Our goal is always to provide you with excellent care. Hearing back from our patients is one way we can continue to improve our services. Please take a few minutes to complete the written survey that you may receive in the mail after your visit with us. Thank you!             Your Updated Medication List - Protect others around you: Learn how to safely use, store and throw away your medicines at www.disposemymeds.org.          This list is accurate as of 3/12/18 10:31 AM.  Always use your most recent med list.                   Brand Name Dispense Instructions for use Diagnosis    aspirin 325 MG tablet     90 tablet    Take 1 tablet (325 mg) by mouth daily    Cerebral vascular disease       atorvastatin 40 MG tablet    LIPITOR    90 tablet    Take 1 tablet (40 mg) by mouth daily    Cerebral vascular disease       blood glucose monitoring test strip    ONETOUCH TEST STRIPS    1 Box    Use to test blood sugar 4 times daily or as directed. (Please dispense preferred brand by insurance)        glipiZIDE 5 MG tablet    GLUCOTROL    180 tablet    Take 1 tablet (5 mg) by mouth 2 times daily (before meals)    Type 2 diabetes mellitus with hyperglycemia, unspecified long term insulin use status (H)       insulin glargine 100 UNIT/ML injection    LANTUS SOLOSTAR    3 mL    Inject 15 Units Subcutaneous At Bedtime     Type 2 diabetes mellitus with hyperglycemia, without long-term current use of insulin (H)       insulin pen needle 32G X 6 MM    NOVOFINE    100 each    Use once daily or as directed for Lantus Solostar.    Type 2 diabetes mellitus with stage 2 chronic kidney disease, with long-term current use of insulin (H)       lisinopril 10 MG tablet    PRINIVIL/ZESTRIL    90 tablet    Take 1 tablet (10 mg) by mouth daily    Benign essential hypertension       metFORMIN 500 MG 24 hr tablet    GLUCOPHAGE-XR    90 tablet    Take 1 tablet (500 mg) by mouth every morning (before breakfast)    Type 2 diabetes mellitus with hyperglycemia, without long-term current use of insulin (H)       ONETOUCH BASIC SYSTEM W/DEVICE Kit     1 kit    Use to test blood sugar 3-4 times daily or as directed. (Please dispense LifeScan or preferred brand by insurance)    Type 2 diabetes mellitus without complication (H)       ONETOUCH LANCETS Misc     100 each    Use to test blood sugars 3-4 times daily or as directed. (Please dispense LifeScan or preferred brand by insurance)    Type 2 diabetes mellitus without complication (H)

## 2018-03-19 ENCOUNTER — TELEPHONE (OUTPATIENT)
Dept: FAMILY MEDICINE | Facility: CLINIC | Age: 83
End: 2018-03-19

## 2018-03-19 DIAGNOSIS — E11.65 TYPE 2 DIABETES MELLITUS WITH HYPERGLYCEMIA, WITHOUT LONG-TERM CURRENT USE OF INSULIN (H): ICD-10-CM

## 2018-03-19 NOTE — TELEPHONE ENCOUNTER
[facility] phone call message- general phone call:    Reason for call: Pt calling in stating Dr Gutierrez wanted his insulin results from last week. States his morning results were between 7:15am and 7:45am and the evenings were between 8:45pm and 9:00pm and all within 15 units of insulin. Results are as follows:     3/12 187 in the am and 266 in the pm  3/13 209 in the am and 260 in the pm  3/14 253 in the am and 112 at 8:45pm, due to it being low he had some ice cream and took it again at 10:00pm and it was 420   3/15 246 in the am and 240 in the pm   3/16 355 in the am and 265 in the pm  3/17 209 in the am and 289 in the pm   3/18 269 in the am and 260 in the pm     States he will be home after 2pm today if there's any questions or a vm can be left also.     Return call needed: Yes    OK to leave a message on voice mail? Yes    Primary language: English      needed? No    Call taken on March 19, 2018 at 9:09 AM by Luz Marina Stover

## 2018-03-21 ENCOUNTER — DOCUMENTATION ONLY (OUTPATIENT)
Dept: FAMILY MEDICINE | Facility: CLINIC | Age: 83
End: 2018-03-21

## 2018-03-21 NOTE — TELEPHONE ENCOUNTER
I called and spoke with patient, blood glucoses reviewed (outlined below)    Fasting and post prandial readings well above goal.    Loose stools improved with the recent reduction in metformin to 500mg once daily.    Continue metformin at just 500mg once daily.    Increase Lantus insulin from 15 to 20 units daily.    Record glucose fasting AM and QHS daily for next 2 weeks and call with readings for further medication titration.    Eat a snack and then call MD if any glucose reading under 60.    Lorie ZUÑIGA

## 2018-04-09 ENCOUNTER — OFFICE VISIT (OUTPATIENT)
Dept: FAMILY MEDICINE | Facility: CLINIC | Age: 83
End: 2018-04-09
Payer: MEDICARE

## 2018-04-09 VITALS
HEART RATE: 81 BPM | BODY MASS INDEX: 28.15 KG/M2 | TEMPERATURE: 98.6 F | WEIGHT: 209 LBS | SYSTOLIC BLOOD PRESSURE: 133 MMHG | DIASTOLIC BLOOD PRESSURE: 74 MMHG | OXYGEN SATURATION: 97 %

## 2018-04-09 DIAGNOSIS — Z00.00 WELLNESS EXAMINATION: Primary | ICD-10-CM

## 2018-04-09 DIAGNOSIS — I67.9 CEREBRAL VASCULAR DISEASE: ICD-10-CM

## 2018-04-09 DIAGNOSIS — Z00.00 INITIAL MEDICARE ANNUAL WELLNESS VISIT: Primary | ICD-10-CM

## 2018-04-09 DIAGNOSIS — E11.65 TYPE 2 DIABETES MELLITUS WITH HYPERGLYCEMIA, WITHOUT LONG-TERM CURRENT USE OF INSULIN (H): ICD-10-CM

## 2018-04-09 RX ORDER — ATORVASTATIN CALCIUM 40 MG/1
40 TABLET, FILM COATED ORAL DAILY
Qty: 90 TABLET | Refills: 3 | Status: SHIPPED | OUTPATIENT
Start: 2018-04-09 | End: 2019-04-04

## 2018-04-09 NOTE — NURSING NOTE
Medicare Wellness Visit  Health Risk Assessment          FALL RISK ASSESSMENT 2018   Fallen 2 or more times in the past year? No No   Any fall with injury in the past year? No No     TUseconds       Health Risk Assessment / Review of Systems     Constitutional: Any fevers or night sweats? 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  No     Cardiovascular: Any chest pain, fast or irregular heart beat, calf pain with walking?     No           Respiratory:   Any breathing problems or cough?   No     Gastrointestinal: Any stomach or stool problems?    YES being seen by  for symptoms     Genitourinary: Do you have difficulty controlling urination?   No     Muscles and Joints: Any joint stiffness or soreness?   No     Skin: Any concerning lesions or moles?   No     Nervous System: Any loss of strength or feeling, numbness or tingling, shaking, dizziness, or headache?  No     Mental Health: Any depression, anxiety or problems sleeping?    No     Cognition: Do you have any problems with your memory?  No            Medical Care     What other specialists or organizations are involved in your medical care?  [facility];   Patient Care Team       Relationship Specialty Notifications Start End    Jhon Gutierrez MD PCP - General Family Practice  14     Phone: [phone] Fax: [phone]         [facility] [address] [place] [address]          Have you been to the ER or overnight in the hospital in the last year?  No          Social History / Home Safety       Marital Status:Single  Who lives in your household? Lives alone in assisted living (engages in activities in living community including coffee, cards, and bible study)    Do you feel threatened or controlled by a partner, ex-partner or anyone in your life? No     Has anyone hurt you physically, for example by pushing, hitting, slapping or kicking you  "  or forcing you to have sex? No          Does your home have any of the following safety concerns? Loose rugs in the hallway, no grab bars in the bathroom, no handrails on the stairs or have poorly lit areas?  No     Do you need help with dressing yourself, bathing, eating or getting around your home?No     Do you need help with the phone, transportation, shopping, preparing meals, housework, laundry, medications or managing money?No       Risk Behaviors and Healthy Habits     History   Smoking Status     Former Smoker     Types: Cigarettes     Quit date: 5/21/1994   Smokeless Tobacco     Former User     Quit date: 5/21/1952     How many servings of fruits and vegetables do you eat a day? 3 (educated on 5 servings a day)    Exercise: 2-3 days/week for an average of 15-30 minutes walking      Do you frequently drive without a seatbelt? No     Do you use tobacco?  No     Do you use any other drugs? No         Do you use alcohol?Yes  Number of drinks per day 1 per week  Number of drinking days a week 1      Frailty Assessment            How difficult is walking from one room to the other on the same level?not  Yes=moderate or severe No     Is it difficult to lift or carry something as heavy as 10 pounds?not  Yes=moderate or severe No     Have you lost 10 or more pounds unintentionally in the previous year? No  (If \"Yes\" and >5% weight loss, then score 1.  Score 0, if <5% weight loss or \"No\" weight loss)    Compared with most (men/women) your age, would you say  that you are more active, less active, or about the same? Same       A doctor told the patient they had the following illnesses:  Stroke (in eye)  Diabetes  Kidney disease  Cancer(Prostate 8 years ago, is seen yearly for check-ups)   Scored:4       Advance Directives:   Discussed with patient and family as appropriate. Has patient  completed advance directives and/or a living will? yes      Latha Leon RN    "

## 2018-04-09 NOTE — MR AVS SNAPSHOT
After Visit Summary   2018    Elie Martinez    MRN: [id number]           Patient Information     Date Of Birth          1932        Visit Information        Provider Department      2018 9:20 AM Rn, Rosa Ump [facility]        Today's Diagnoses     Wellness examination    -  1       Follow-ups after your visit        Your next 10 appointments already scheduled     2018  9:20 AM CDT   65+ Annual Wellness with Rosa Espinoza Rn   [facility] ([facility])    [address] [place] [address]   [phone]            2018  9:40 AM CDT   65+ Annual Wellness with Jhon Gutierrez MD   [facility] ([facility])    [address] [place] [address]   [phone]              Who to contact     Please call your clinic at [phone] to:    Ask questions about your health    Make or cancel appointments    Discuss your medicines    Learn about your test results    Speak to your doctor            Additional Information About Your Visit        MyChart Information     Syncplicity is an electronic gateway that provides easy, online access to your medical records. With [employer], you can request a clinic appointment, read your test results, renew a prescription or communicate with your care team.     To sign up for Syncplicity visit the website at [email or web address]   You will be asked to enter the access code listed below, as well as some personal information. Please follow the directions to create your username and password.     Your access code is: [email or web address]  Expires: 2018 11:26 AM     Your access code will  in 90 days. If you need help or a new code, please contact your [facility] or call [phone] for assistance.        Care EveryWhere ID     This is your Care EveryWhere ID. This could be used by other organizations to access your [place] medical records  [id number]         Blood  Pressure from Last 3 Encounters:   03/12/18 128/76   02/05/18 155/77   01/29/18 127/77    Weight from Last 3 Encounters:   03/12/18 214 lb (97.1 kg)   02/05/18 214 lb 12.8 oz (97.4 kg)   01/29/18 214 lb (97.1 kg)              Today, you had the following     No orders found for display       Primary Care Provider Office Phone # Fax #    Jhon Gutierrez [phone]       [facility] [address] [place] [address]        Equal Access to Services     [facility]: Hadii aad ku hadasho Soomaali, waaxda luqadaha, qaybta kaalmada adeegyada, carlos denise . So [facility] [phone].    ATENCIÓN: Si habla español, tiene a ponce disposición servicios gratuitos de asistencia lingüística. [facility] [phone].    We comply with applicable federal civil rights laws and [place] laws. We do not discriminate on the basis of race, color, national origin, age, disability, sex, sexual orientation, or gender identity.            Thank you!     Thank you for choosing [facility]  for your care. Our goal is always to provide you with excellent care. Hearing back from our patients is one way we can continue to improve our services. Please take a few minutes to complete the written survey that you may receive in the mail after your visit with us. Thank you!             Your Updated Medication List - Protect others around you: Learn how to safely use, store and throw away your medicines at www.disposemymeds.org.          This list is accurate as of 4/9/18  9:02 AM.  Always use your most recent med list.                   Brand Name Dispense Instructions for use Diagnosis    aspirin 325 MG tablet     90 tablet    Take 1 tablet (325 mg) by mouth daily    Cerebral vascular disease       atorvastatin 40 MG tablet    LIPITOR    90 tablet    Take 1 tablet (40 mg) by mouth daily    Cerebral vascular disease       blood glucose monitoring test strip    ONETOUCH TEST STRIPS    1 Box     Use to test blood sugar 4 times daily or as directed. (Please dispense preferred brand by insurance)        glipiZIDE 5 MG tablet    GLUCOTROL    180 tablet    Take 1 tablet (5 mg) by mouth 2 times daily (before meals)    Type 2 diabetes mellitus with hyperglycemia, unspecified long term insulin use status (H)       insulin glargine 100 UNIT/ML injection    LANTUS SOLOSTAR    3 mL    Inject 20 Units Subcutaneous At Bedtime    Type 2 diabetes mellitus with hyperglycemia, without long-term current use of insulin (H)       insulin pen needle 32G X 6 MM    NOVOFINE    100 each    Use once daily or as directed for Lantus Solostar.    Type 2 diabetes mellitus with stage 2 chronic kidney disease, with long-term current use of insulin (H)       lisinopril 10 MG tablet    PRINIVIL/ZESTRIL    90 tablet    Take 1 tablet (10 mg) by mouth daily    Benign essential hypertension       metFORMIN 500 MG 24 hr tablet    GLUCOPHAGE-XR    90 tablet    Take 1 tablet (500 mg) by mouth every morning (before breakfast)    Type 2 diabetes mellitus with hyperglycemia, without long-term current use of insulin (H)       ONETOUCH BASIC SYSTEM W/DEVICE Kit     1 kit    Use to test blood sugar 3-4 times daily or as directed. (Please dispense LifeScan or preferred brand by insurance)    Type 2 diabetes mellitus without complication (H)       ONETOUCH LANCETS Misc     100 each    Use to test blood sugars 3-4 times daily or as directed. (Please dispense LifeScan or preferred brand by insurance)    Type 2 diabetes mellitus without complication (H)

## 2018-04-09 NOTE — PROGRESS NOTES
Annual Wellness Visit for 65 years and older    HPI  This 85 year old male presents as an established patient  Jhon Gutierrez who presents for a  Initial Medicare Wellness Visit  Other issues patient wants to be addressed today:  Chief Complaint   Patient presents with     Wellness Visit     Medication Reconciliation     completed.        Patient Active Problem List   Diagnosis     Long-term insulin use in type 2 diabetes (H)     Cerebral vascular disease     S/P carotid endarterectomy     Hypertension     Prostate cancer (H)     Hyperlipidemia LDL goal <100     Chronic renal disease, stage II     Past Medical History:   Diagnosis Date     Cancer (H)     prostate     Cerebral vascular disease      Chronic kidney disease      Diabetes (H)      Hyperlipidemia      Hypertension        Family History   Problem Relation Age of Onset     DIABETES Daughter      Hypertension No family hx of      Coronary Artery Disease No family hx of      Breast Cancer No family hx of      Colon Cancer No family hx of      Prostate Cancer No family hx of      Other Cancer No family hx of      Asthma No family hx of      Problem List, Family History and past Medical History reviewed and  unchanged/updated.    Nursing Notes:   Latha Leon, RN  2018  9:38 AM  Addendum  Medicare Wellness Visit  Health Risk Assessment          FALL RISK ASSESSMENT 2018   Fallen 2 or more times in the past year? No No   Any fall with injury in the past year? No No     TUseconds       Health Risk Assessment / Review of Systems     Constitutional: Any fevers or night sweats? 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  No     Cardiovascular: Any chest pain, fast or irregular heart beat, calf pain with walking?     No           Respiratory:   Any breathing problems or cough?   No     Gastrointestinal: Any stomach or stool problems?    YES being seen by  for symptoms      Genitourinary: Do you have difficulty controlling urination?   No     Muscles and Joints: Any joint stiffness or soreness?   No     Skin: Any concerning lesions or moles?   No     Nervous System: Any loss of strength or feeling, numbness or tingling, shaking, dizziness, or headache?  No     Mental Health: Any depression, anxiety or problems sleeping?    No     Cognition: Do you have any problems with your memory?  No            Medical Care     What other specialists or organizations are involved in your medical care?  [facility];   Patient Care Team       Relationship Specialty Notifications Start End    Jhon Gutierrez MD PCP - General Family Practice  5/1/14     Phone: [phone] Fax: [phone]         [facility] [address] [facility] [address]          Have you been to the ER or overnight in the hospital in the last year?  No          Social History / Home Safety       Marital Status:Single  Who lives in your household? Lives alone in assisted living (engages in activities in living community including coffee, cards, and bible study)    Do you feel threatened or controlled by a partner, ex-partner or anyone in your life? No     Has anyone hurt you physically, for example by pushing, hitting, slapping or kicking you   or forcing you to have sex? No          Does your home have any of the following safety concerns? Loose rugs in the hallway, no grab bars in the bathroom, no handrails on the stairs or have poorly lit areas?  No     Do you need help with dressing yourself, bathing, eating or getting around your home?No     Do you need help with the phone, transportation, shopping, preparing meals, housework, laundry, medications or managing money?No       Risk Behaviors and Healthy Habits     History   Smoking Status     Former Smoker     Types: Cigarettes     Quit date: 5/21/1994   Smokeless Tobacco     Former User     Quit date: 5/21/1952     How many servings of fruits and  "vegetables do you eat a day? 3 (educated on 5 servings a day)    Exercise: 2-3 days/week for an average of 15-30 minutes walking      Do you frequently drive without a seatbelt? No     Do you use tobacco?  No     Do you use any other drugs? No         Do you use alcohol?Yes  Number of drinks per day 1 per week  Number of drinking days a week 1      Frailty Assessment            How difficult is walking from one room to the other on the same level?not  Yes=moderate or severe No     Is it difficult to lift or carry something as heavy as 10 pounds?not  Yes=moderate or severe No     Have you lost 10 or more pounds unintentionally in the previous year? No  (If \"Yes\" and >5% weight loss, then score 1.  Score 0, if <5% weight loss or \"No\" weight loss)    Compared with most (men/women) your age, would you say  that you are more active, less active, or about the same? Same       A doctor told the patient they had the following illnesses:  Stroke (in eye)  Diabetes  Kidney disease  Cancer(Prostate 8 years ago, is seen yearly for check-ups)   Scored:4       Advance Directives:   Discussed with patient and family as appropriate. Has patient  completed advance directives and/or a living will? yes      SOHEILA Griggs Cheng, CMA  4/9/2018  9:52 AM  Signed  Chief Complaint   Patient presents with     Wellness Visit     Medication Reconciliation     completed.        /74  Pulse 81  Temp 98.6  F (37  C) (Oral)  Wt 209 lb (94.8 kg)  SpO2 97%  BMI 28.15 kg/m2      Are you sexually active?  No         1. Weight loss (>5% in year)  No  Wt Readings from Last 5 Encounters:   04/09/18 209 lb (94.8 kg)   03/12/18 214 lb (97.1 kg)   02/05/18 214 lb 12.8 oz (97.4 kg)   01/29/18 214 lb (97.1 kg)   09/11/17 217 lb (98.4 kg)       2. Exhaustion (perceived effort for a given activity)   How difficult is walking from one room to the other on the same level?not  Yes=moderate or severe No     3. Weakness (handgrip " strength)   How difficult is lifting or carrying something as heavy as 10 pounds? not  Yes=moderate or severe No    4. Slow gait speed (timed up and go > 12 sec.)     5. Decreased physical activity    Compared with most (men/women) your age, would you say  that you are more active, less active, or about the same? Same      Total # YES:0    Not Frail      EVALUATION OF COGNITIVE FUNCTION  Mood/affect:Normal  Appearance:Normal  Family member/caregiver input: not present    PHQ-2 Score:   PHQ-2 ( 1999 Pfizer) 4/9/2018 3/12/2018   Q1: Little interest or pleasure in doing things 0 0   Q2: Feeling down, depressed or hopeless 0 0   PHQ-2 Score 0 0       PHQ-9 Score: 0  No flowsheet data found.      3. Ask the patient to repeat the 3 previously presented word }  Mini Cog Scoring 3 points   Clock Draw Test result: Normal   Cognitive screen is:Negative    Other Assessments  CV Risk based on Pooled Cohort Risk (consider assessing every 4-6 years;  consider statin in patients with 10-yr risk &gt; 7.5%):Recommend statin  The ASCVD Risk score (Alvaro TERRIE Jr, et al., 2013) failed to calculate for the following reasons:    The 2013 ASCVD risk score is only valid for ages 40 to 79    ECG (if done)not performed    Corrected Visual acuity: follows with eye MD  Hearing evaluation if done: No      ASA use (>3% risk in 5 y):    Last colonoscopy 5 years ago, reported normal per patient    Advance Directives: Discussed with patient and family as appropriate. Has patient  completed advance directives and/or a living will? yes    Son Favian and daughter Karishma are medical power of     Full code, but does not want artificial life support beyond brief rescue attempt per our conversation today    Immunization History   Administered Date(s) Administered     Influenza (High Dose) 3 valent vaccine 10/01/2015, 10/01/2016, 09/21/2017     Influenza (IIV3) PF 09/01/2014     Pneumo Conj 13-V (2010&after) 02/04/2016     Pneumococcal 23 valent  01/13/2012     TDAP Vaccine (Boostrix) 08/21/2017     Reviewed Immunization Record Today  Physical Exam    Vitals: /74  Pulse 81  Temp 98.6  F (37  C) (Oral)  Wt 209 lb (94.8 kg)  SpO2 97%  BMI 28.15 kg/m2  BMI= Body mass index is 28.15 kg/(m^2).  EXAM:  Constitutional: alert and no distress   Cardiovascular: negative, No edema or JVD.  Respiratory: negative  Head: Normocephalic. No masses, lesions, tenderness or abnormalities  Neck: Neck supple. No adenopathy. Thyroid symmetric, normal size,, Carotids without bruits.  ENT: ENT exam normal, no neck nodes or sinus tenderness  Abdomen: Abdomen soft, non-tender. BS normal. No masses, organomegaly  NEURO: Gait normal. Reflexes normal and symmetric. Sensation grossly WNL.  Assessment and Plan      Reviewed Preventive Services and Plan form with patient as specified in  Patient Instructions.  Positive findings on assessment:  Chronic disease management:  DM 2  Cerebral vascular disease  Elie was seen today for wellness visit and medication reconciliation.    Diagnoses and all orders for this visit:    Type 2 diabetes mellitus with hyperglycemia, without long-term current use of insulin (H)  -     insulin glargine (LANTUS SOLOSTAR) 100 UNIT/ML injection; Inject 25 Units Subcutaneous At Bedtime        Options for treatment and follow-up care were reviewed with the Elie Martinez  and/or guardian engaged in the decision making process and verbalized  understanding of the options discussed and agreed with the final plan.  Jhon Gutierrez MD

## 2018-04-09 NOTE — PATIENT INSTRUCTIONS
Increase your Lantus to 25 units. Call Dr. Gutierrez in 1 week with your sugar readings. We may need to go up to 30 units.  Refills has been sent to your pharmacy.     Follow up in one month. We will check your 3 months blood sugar average test (A1c) and Kidney function at that time.       +++++++++++++++++++++++++++++++++++++++++++++++++++++++++++++++++++++++  Your medication list is printed, please keep this with you, it is helpful to bring this current list to any other medical appointments, the emergency room or hospital.    If you had lab testing today and your results are reassuring or normal they will be be mailed to you within 7 days.     If the lab tests need quick action we will call you with the results.   The phone number we will call with results is # [phone] (home) . If this is not the best number please call our clinic and change the number.    If you need any refills please call your pharmacy and they will contact us.    If you have any further concerns or wish to schedule another appointment you must call our office during normal business hours  [phone] (8-5:00 M-F)  If you have urgent medical questions that cannot wait  you may also call [phone] at any time of day.  If you have a medical emergency please call [phone].    Thank you for coming to [facility].

## 2018-04-09 NOTE — PROGRESS NOTES
Subjective: 85-year-old male with type 2 diabetes hypertension.  In February 2018 he started Lantus at a dose of 10 units.  This was increased to 15 units in early March and then 20 units March 19.  He brings a glucose log today showing regular twice daily glucose monitoring.  He checks his glucose fasting and in the afternoon.  His morning glucoses continue in the 250-260 range frequently.  There does not appear to be any significant change in glucoses with the dose increased from 15-20 units.  He continues on glipizide twice daily postprandial glucoses are frequently in the 250-300 range.  He denies polyuria polydipsia.  His loose stools significantly improved and have nearly resolved since reducing his Metformin extended release dose.  Review of systems: No abdominal pain.  No nausea.  No hypoglycemic symptoms.  Exam  Blood pressure is at goal  General: Alert no distress relates his own history  HEENT head is nor cephalic atraumatic tympanic membranes are partially obstructed by cerumen  Cardiovascular: Regular rate and rhythm  Respiratory: Clear bilaterally  Abdomen: Soft and nontender  Extremities: Free of edema    Assessment and plan  1) type 2 diabetes: Fasting glucose is well above goal.  Increase Lantus insulin from 20-25 units daily.  Continue to monitor blood glucoses twice daily.  Continue glipizide twice daily.  Goal A1c 8.0% or less.  Metformin dose is maximized due to side effect of loose stools at higher doses.  Plan for an A1c in May 2018  2)  Hypertension: Well controlled.  Continue lisinopril.  Basic metabolic profile May 2018  3) stage II chronic kidney disease: Baseline GFR about 60 repeat BMP May 2018  4) cerebrovascular disease status post carotid endarterectomy.  Continue secondary prevention.  5) history of prostate cancer: Continue to follow with urology

## 2018-04-09 NOTE — NURSING NOTE
Chief Complaint   Patient presents with     Wellness Visit     Medication Reconciliation     completed.        /74  Pulse 81  Temp 98.6  F (37  C) (Oral)  Wt 209 lb (94.8 kg)  SpO2 97%  BMI 28.15 kg/m2

## 2018-04-09 NOTE — MR AVS SNAPSHOT
After Visit Summary   4/9/2018    Elie Martinez    MRN: [id number]           Patient Information     Date Of Birth          9/8/1932        Visit Information        Provider Department      4/9/2018 9:40 AM Jhon Gutierrez MD [facility]        Today's Diagnoses     Type 2 diabetes mellitus with hyperglycemia, without long-term current use of insulin (H)          Care Instructions    Increase your Lantus to 25 units. Call Dr. Gutierrez in 1 week with your sugar readings. We may need to go up to 30 units.  Refills has been sent to your pharmacy.     Follow up in one month. We will check your 3 months blood sugar average test (A1c) and Kidney function at that time.       +++++++++++++++++++++++++++++++++++++++++++++++++++++++++++++++++++++++  Your medication list is printed, please keep this with you, it is helpful to bring this current list to any other medical appointments, the emergency room or hospital.    If you had lab testing today and your results are reassuring or normal they will be be mailed to you within 7 days.     If the lab tests need quick action we will call you with the results.   The phone number we will call with results is # [phone] (home) . If this is not the best number please call our clinic and change the number.    If you need any refills please call your pharmacy and they will contact us.    If you have any further concerns or wish to schedule another appointment you must call our office during normal business hours  [phone] (8-5:00 M-F)  If you have urgent medical questions that cannot wait  you may also call [phone] at any time of day.  If you have a medical emergency please call [phone].    Thank you for coming to [facility].            Follow-ups after your visit        Your next 10 appointments already scheduled     May 14, 2018  9:40 AM CDT   Return Visit with Jhon Gutierrez MD   [facility] ([facility])    [address] [place] [address]  [address]  [facility] [address]   [phone]              Who to contact     Please call your clinic at [phone] to:    Ask questions about your health    Make or cancel appointments    Discuss your medicines    Learn about your test results    Speak to your doctor            Additional Information About Your Visit        MyChart Information     [employer] is an electronic gateway that provides easy, online access to your medical records. With [employer], you can request a clinic appointment, read your test results, renew a prescription or communicate with your care team.     To sign up for [employer] visit the website at [email or web address]   You will be asked to enter the access code listed below, as well as some personal information. Please follow the directions to create your username and password.     Your access code is: [email or web address]  Expires: 2018 11:26 AM     Your access code will  in 90 days. If you need help or a new code, please contact your [facility] or call [phone] for assistance.        Care EveryWhere ID     This is your Care EveryWhere ID. This could be used by other organizations to access your [facility] medical records  [id number]        Your Vitals Were     Pulse Temperature Pulse Oximetry BMI (Body Mass Index)          81 98.6  F (37  C) (Oral) 97% 28.15 kg/m2         Blood Pressure from Last 3 Encounters:   18 133/74   18 128/76   18 155/77    Weight from Last 3 Encounters:   18 209 lb (94.8 kg)   18 214 lb (97.1 kg)   18 214 lb 12.8 oz (97.4 kg)              Today, you had the following     No orders found for display         Today's Medication Changes          These changes are accurate as of 18 10:34 AM.  If you have any questions, ask your nurse or doctor.               These medicines have changed or have updated prescriptions.        Dose/Directions    insulin glargine 100 UNIT/ML injection   Commonly  known as:  LANTUS SOLOSTAR   This may have changed:  how much to take   Used for:  Type 2 diabetes mellitus with hyperglycemia, without long-term current use of insulin (H)   Changed by:  Jhon Gutierrez MD        Dose:  25 Units   Inject 25 Units Subcutaneous At Bedtime   Quantity:  3 mL   Refills:  0       metFORMIN 500 MG 24 hr tablet   Commonly known as:  GLUCOPHAGE-XR   This may have changed:  additional instructions   Used for:  Type 2 diabetes mellitus with hyperglycemia, without long-term current use of insulin (H)        Dose:  500 mg   Take 1 tablet (500 mg) by mouth every morning (before breakfast)   Quantity:  90 tablet   Refills:  3            Where to get your medicines      These medications were sent to [facility] - [address] [address]     Phone:  [phone]     insulin glargine 100 UNIT/ML injection                Primary Care Provider Office Phone # Fax #    Jhon Gutierrez [phone]       [facility] [address] [place] [address]        Equal Access to Services     [facility] AH: Hadii aad ku hadasho Soomaali, waaxda luqadaha, qaybta kaalmada adeegyada, waxay idiin hayaan jade denise . So [facility] [phone].    ATENCIÓN: Si habla español, tiene a ponce disposición servicios gratuitos de asistencia lingüística. Llame al [phone].    We comply with applicable federal civil rights laws and [place] laws. We do not discriminate on the basis of race, color, national origin, age, disability, sex, sexual orientation, or gender identity.            Thank you!     Thank you for choosing [facility]  for your care. Our goal is always to provide you with excellent care. Hearing back from our patients is one way we can continue to improve our services. Please take a few minutes to complete the written survey that you may receive in the mail after your visit with us. Thank  you!             Your Updated Medication List - Protect others around you: Learn how to safely use, store and throw away your medicines at www.disposemymeds.org.          This list is accurate as of 4/9/18 10:34 AM.  Always use your most recent med list.                   Brand Name Dispense Instructions for use Diagnosis    aspirin 325 MG tablet     90 tablet    Take 1 tablet (325 mg) by mouth daily    Cerebral vascular disease       atorvastatin 40 MG tablet    LIPITOR    90 tablet    Take 1 tablet (40 mg) by mouth daily    Cerebral vascular disease       blood glucose monitoring test strip    ONETOUCH TEST STRIPS    1 Box    Use to test blood sugar 4 times daily or as directed. (Please dispense preferred brand by insurance)        glipiZIDE 5 MG tablet    GLUCOTROL    180 tablet    Take 1 tablet (5 mg) by mouth 2 times daily (before meals)    Type 2 diabetes mellitus with hyperglycemia, unspecified long term insulin use status (H)       insulin glargine 100 UNIT/ML injection    LANTUS SOLOSTAR    3 mL    Inject 25 Units Subcutaneous At Bedtime    Type 2 diabetes mellitus with hyperglycemia, without long-term current use of insulin (H)       insulin pen needle 32G X 6 MM    NOVOFINE    100 each    Use once daily or as directed for Lantus Solostar.    Type 2 diabetes mellitus with stage 2 chronic kidney disease, with long-term current use of insulin (H)       lisinopril 10 MG tablet    PRINIVIL/ZESTRIL    90 tablet    Take 1 tablet (10 mg) by mouth daily    Benign essential hypertension       metFORMIN 500 MG 24 hr tablet    GLUCOPHAGE-XR    90 tablet    Take 1 tablet (500 mg) by mouth every morning (before breakfast)    Type 2 diabetes mellitus with hyperglycemia, without long-term current use of insulin (H)       ONETOUCH BASIC SYSTEM W/DEVICE Kit     1 kit    Use to test blood sugar 3-4 times daily or as directed. (Please dispense LifeScan or preferred brand by insurance)    Type 2 diabetes mellitus without  complication (H)       ONETOUCH LANCETS Misc     100 each    Use to test blood sugars 3-4 times daily or as directed. (Please dispense LifeScan or preferred brand by insurance)    Type 2 diabetes mellitus without complication (H)

## 2018-04-23 ENCOUNTER — TELEPHONE (OUTPATIENT)
Dept: FAMILY MEDICINE | Facility: CLINIC | Age: 83
End: 2018-04-23

## 2018-04-23 DIAGNOSIS — E11.65 TYPE 2 DIABETES MELLITUS WITH HYPERGLYCEMIA, WITHOUT LONG-TERM CURRENT USE OF INSULIN (H): ICD-10-CM

## 2018-04-23 NOTE — TELEPHONE ENCOUNTER
[facility] phone call message- general phone call:    Reason for call: Pt is calling in to let Dr. Gutierrez know his blood count. He took his blood sugars from 7-8 in the morning, and 8-9 in the evening. These following results are all with 30 units of insulin. He says to call him if any questions.    16th: 263 in the am, and 254 in the pm  17th: 276 in the am and 234 in the evening   18th: 251 in the am and 313 in the pm  19th: 306 in the am 305pm in the pm  20th: 240 in the am 306 in the pm  21st: 246 in the am and 303pm in the pm  22nd 250 in the am 313 in the pm   23rd: 278am in the am     Return call needed: No    OK to leave a message on voice mail? Yes    Primary language: English      needed? No    Call taken on April 23, 2018 at 9:12 AM by Kayla Triplett

## 2018-04-23 NOTE — TELEPHONE ENCOUNTER
Spoke with patient.   No blood glucoses under 250.    Increase Lantus insulin to 35 units.    Call with glucoses next week.    F/U appt scheduled.

## 2018-05-01 ENCOUNTER — TELEPHONE (OUTPATIENT)
Dept: FAMILY MEDICINE | Facility: CLINIC | Age: 83
End: 2018-05-01

## 2018-05-01 DIAGNOSIS — E11.65 TYPE 2 DIABETES MELLITUS WITH HYPERGLYCEMIA, WITHOUT LONG-TERM CURRENT USE OF INSULIN (H): ICD-10-CM

## 2018-05-01 NOTE — TELEPHONE ENCOUNTER
[facility] phone call message- general phone call:    Reason for call:  Pt is calling in to let Dr. Gutierrez know his blood count. He took his blood sugars from 7-8 in the morning, and 8-9 in the evening. These following results are all with 35 units of insulin. He says to call him if any questions.    24th: 301 in the am, 312 in the pm  25th: 284 in the am, 293 in the pm  26th: 232 in the am, 158 in the pm  27th: 234 in the am, 212 in the pm  28th: 223 in the am, 234 in the pm  29th: 269 in the am, 153 in the pm   30th: 246 in the am, 290 in the pm  1st: 233 in the am.     Return call needed: Yes    OK to leave a message on voice mail? Yes    Primary language: English      needed? No    Call taken on May 1, 2018 at 10:04 AM by Kayla Triplett

## 2018-05-03 NOTE — TELEPHONE ENCOUNTER
Blood glucose readings reviewed.  With lantus dose increase from 30 to 35 units last week, his afternoon glucoses have improved, minimal improvement in AM glucoses.  I called patient this morning and advised increasing LANTUS insulin dose from 35 to 38 units daily. Med list updated.  Call blood glucose readings on Monday May 14th.    Reluctant to make any further increase in insulin dose without inperson evaluation of his insulin administration technique and further discussion.    Recommend scheduling visit with me and pharmacist and/or DM education.    DAISHA Gutierrez

## 2018-05-14 ENCOUNTER — OFFICE VISIT (OUTPATIENT)
Dept: FAMILY MEDICINE | Facility: CLINIC | Age: 83
End: 2018-05-14
Payer: MEDICARE

## 2018-05-14 ENCOUNTER — RECORDS - HEALTHEAST (OUTPATIENT)
Dept: ADMINISTRATIVE | Facility: OTHER | Age: 83
End: 2018-05-14

## 2018-05-14 VITALS
BODY MASS INDEX: 28.01 KG/M2 | SYSTOLIC BLOOD PRESSURE: 138 MMHG | TEMPERATURE: 97.9 F | OXYGEN SATURATION: 95 % | HEART RATE: 69 BPM | DIASTOLIC BLOOD PRESSURE: 73 MMHG | WEIGHT: 208 LBS

## 2018-05-14 DIAGNOSIS — E11.65 TYPE 2 DIABETES MELLITUS WITH HYPERGLYCEMIA, WITHOUT LONG-TERM CURRENT USE OF INSULIN (H): Primary | ICD-10-CM

## 2018-05-14 DIAGNOSIS — N18.1 CKD (CHRONIC KIDNEY DISEASE) STAGE 1, GFR 90 ML/MIN OR GREATER: ICD-10-CM

## 2018-05-14 DIAGNOSIS — I10 ESSENTIAL HYPERTENSION: ICD-10-CM

## 2018-05-14 LAB
BUN SERPL-MCNC: 23 MG/DL (ref 7–30)
CALCIUM SERPL-MCNC: 9.5 MG/DL (ref 8.5–10.4)
CHLORIDE SERPLBLD-SCNC: 102 MMOL/L (ref 94–109)
CO2 SERPL-SCNC: 26 MMOL/L (ref 20–32)
CREAT SERPL-MCNC: 1 MG/DL (ref 0.8–1.5)
EGFR CALCULATED (BLACK REFERENCE): >90 ML/MIN
EGFR CALCULATED (NON BLACK REFERENCE): 75.5 ML/MIN
GLUCOSE SERPL-MCNC: 513 MG/DL (ref 60–109)
HBA1C MFR BLD: 11.7 % (ref 4.1–5.7)
POTASSIUM SERPL-SCNC: 4.9 MMOL/L (ref 3.4–5.3)
SODIUM SERPL-SCNC: 139 MMOL/L (ref 133–144)

## 2018-05-14 NOTE — PROGRESS NOTES
Please send result letter  As we discussed in clinic, your kidney function is stable.Your blood sugar average is above goal.  You will take Lantus insulin 40 units daily, and call with your blood sugar readings on Monday 5/21/18.  See our pharmacist and/or the diabetic educator within the next two weeks.

## 2018-05-14 NOTE — LETTER
May 15, 2018      Elie Martinez  [address]   [place] [facility] [place] [address]        Dear Elie,    As we discussed in clinic, your kidney function is stable.Your blood sugar average is above goal. You will take Lantus insulin 40 units daily, and call with your blood sugar readings on Monday 5/21/18.  See our pharmacist and/or the diabetic educator within the next two weeks.     Please see below for your test results.    Resulted Orders   Basic Metabolic Panel ([facility])  - Results < 1 hr   Result Value Ref Range    Glucose 513.0 (H) 60.0 - 109.0 mg/dL    Urea Nitrogen 23.0 7.0 - 30.0 mg/dL    Creatinine 1.0 0.8 - 1.5 mg/dL    Sodium 139.0 133.0 - 144.0 mmol/L    Potassium 4.9 3.4 - 5.3 mmol/L    Chloride 102.0 94.0 - 109.0 mmol/L    Carbon Dioxide 26.0 20.0 - 32.0 mmol/L    Calcium 9.5 8.5 - 10.4 mg/dL    eGFR Calculated (Non Black Reference) 75.5 >60.0 mL/min    eGFR Calculated (Black Reference) >90 >60.0 mL/min   Hemoglobin A1c ([facility])   Result Value Ref Range    Hemoglobin A1C 11.7 (H) 4.1 - 5.7 %       If you have any questions, please call the clinic to make an appointment.    Sincerely,    Jhon Gutierrez MD

## 2018-05-14 NOTE — MR AVS SNAPSHOT
After Visit Summary   5/14/2018    Elie Martinez    MRN: [id number]           Patient Information     Date Of Birth          9/8/1932        Visit Information        Provider Department      5/14/2018 9:40 AM Jhon Gutierrez MD [facility]        Today's Diagnoses     Chronic renal disease, stage II    -  1    Essential hypertension        Type 2 diabetes mellitus with hyperglycemia, without long-term current use of insulin (H)          Care Instructions      Increase your Lantus to 40 units today      Recommend meeting with our pharmacist to ensure correct use of insulin and diabetic medications      Recommend meeting with diabetes educator Sometime in the next 2-4 weeks      In the morning before you eat your goal blood sugar is between 80 and 160      After you eat your goal blood sugar is less than 260      We will continue to adjust her medications until your blood sugars are frequently in this goal range      Please return to see Dr. Gutierrez in 4-6 weeks earlier as needed.          Follow-ups after your visit        Additional Services     DIABETES EDUCATOR REFERRAL       Date of Diagnosis: 20 years ago    Diabetes Education Order:  Choose either self management, prediabetes,  one on one, endocrine consult, GDM, pump therapy or exanitide start: insulin titration   Diabetes Self Management Class  One on One with Diabetes Educator:  Glucose Monitoring   Insulin Start or Adjust:                        New to insulin?  No                        RN to calculate and adjust insulin?   Yes                        Patient to continue oral medicine?  Yes       needed: No  Language: English    Recent labs including A1C, Lipid panel, FBS or casual glucose or GCT:    Lab Results       Component                Value               Date                       A1C                      12.8                01/29/2018                 A1C                      9.3                 09/11/2017             Lab Results       Component                Value               Date                       CHOL                     122                 01/05/2017                 CHOL                     135.0               12/21/2015            Lab Results       Component                Value               Date                       HDL                      33                  01/05/2017                 HDL                      38.0                12/21/2015            Lab Results       Component                Value               Date                       LDL                      64                  01/05/2017                 LDL                      61.0                12/21/2015                 LDL                      68                  05/21/2014            Lab Results       Component                Value               Date                       TRIG                     181.0               12/21/2015            Lab Results       Component                Value               Date                       CHOLHDLRATIO             3.5                 12/21/2015            Lab Results       Component                Value               Date                       GLC                      371.0               01/29/2018                 GLC                      149.0               05/26/2017          ]    ([facility] Only) Referral should be tracked (Yes/No)? Yes                  Your next 10 appointments already scheduled     May 16, 2018 11:00 AM CDT   Return Visit with Anais Sanchez RPH   [facility] ([facility])    [address] [place] [address] [id number] [phone]            Jun 25, 2018  9:00 AM CDT   Return Visit with Jhon Gutierrez MD   [facility] ([facility])    [address] [place] [address]   [phone]              Who to contact     Please call your clinic at [phone] to:    Ask questions about your health    Make or cancel appointments    Discuss your  medicines    Learn about your test results    Speak to your doctor            Additional Information About Your Visit        Care EveryWhere ID     This is your Care EveryWhere ID. This could be used by other organizations to access your [facility] medical records  [id number]        Your Vitals Were     Pulse Temperature Pulse Oximetry BMI (Body Mass Index)          69 97.9  F (36.6  C) (Oral) 95% 28.01 kg/m2         Blood Pressure from Last 3 Encounters:   05/14/18 138/73   04/09/18 133/74   03/12/18 128/76    Weight from Last 3 Encounters:   05/14/18 208 lb (94.3 kg)   04/09/18 209 lb (94.8 kg)   03/12/18 214 lb (97.1 kg)              We Performed the Following     Basic Metabolic Panel ([facility])  - Results < 1 hr     DIABETES EDUCATOR REFERRAL     Hemoglobin A1c ([facility])          Today's Medication Changes          These changes are accurate as of 5/14/18 10:34 AM.  If you have any questions, ask your nurse or doctor.               These medicines have changed or have updated prescriptions.        Dose/Directions    blood glucose monitoring test strip   Commonly known as:  ONETOUCH TEST STRIPS   This may have changed:  additional instructions   Used for:  Type 2 diabetes mellitus with hyperglycemia, without long-term current use of insulin (H)   Changed by:  Jhon Gutierrez MD        Use to test blood sugar 2 times daily or as directed. (Please dispense preferred brand by insurance)   Quantity:  100 each   Refills:  11       insulin glargine 100 UNIT/ML injection   Commonly known as:  LANTUS SOLOSTAR   This may have changed:  how much to take   Used for:  Type 2 diabetes mellitus with hyperglycemia, without long-term current use of insulin (H)   Changed by:  Jhon Gutierrez MD        Dose:  40 Units   Inject 40 Units Subcutaneous At Bedtime   Quantity:  15 mL   Refills:  1       metFORMIN 500 MG 24 hr tablet   Commonly known as:  GLUCOPHAGE-XR   This may have changed:  additional instructions   Used for:  Type 2  diabetes mellitus with hyperglycemia, without long-term current use of insulin (H)        Dose:  500 mg   Take 1 tablet (500 mg) by mouth every morning (before breakfast)   Quantity:  90 tablet   Refills:  3            Where to get your medicines      These medications were sent to [facility] - [place], [place] - [address]  [address] [address]     Phone:  [phone]     blood glucose monitoring test strip    insulin glargine 100 UNIT/ML injection                Primary Care Provider Office Phone # Fax #    Jhon Gutierrez [phone]       [facility] [address]        Equal Access to Services     [facility]: Hadii tristian garrett hadrobert Sojosiane, waaxda luqadaha, qaybta kaalmada jadeyaearnest, carlos denise . So [facility] [phone].    ATENCIÓN: Si habla español, tiene a ponce disposición servicios gratuitos de asistencia lingüística. [facility] [phone].    We comply with applicable federal civil rights laws and [place] laws. We do not discriminate on the basis of race, color, national origin, age, disability, sex, sexual orientation, or gender identity.            Thank you!     Thank you for choosing [facility]  for your care. Our goal is always to provide you with excellent care. Hearing back from our patients is one way we can continue to improve our services. Please take a few minutes to complete the written survey that you may receive in the mail after your visit with us. Thank you!             Your Updated Medication List - Protect others around you: Learn how to safely use, store and throw away your medicines at www.disposemymeds.org.          This list is accurate as of 5/14/18 10:34 AM.  Always use your most recent med list.                   Brand Name Dispense Instructions for use Diagnosis    aspirin 325 MG tablet     90 tablet    Take 1 tablet (325 mg) by mouth daily    Cerebral vascular  disease       atorvastatin 40 MG tablet    LIPITOR    90 tablet    Take 1 tablet (40 mg) by mouth daily    Cerebral vascular disease       blood glucose monitoring test strip    ONETOUCH TEST STRIPS    100 each    Use to test blood sugar 2 times daily or as directed. (Please dispense preferred brand by insurance)    Type 2 diabetes mellitus with hyperglycemia, without long-term current use of insulin (H)       glipiZIDE 5 MG tablet    GLUCOTROL    180 tablet    Take 1 tablet (5 mg) by mouth 2 times daily (before meals)    Type 2 diabetes mellitus with hyperglycemia, unspecified long term insulin use status (H)       insulin glargine 100 UNIT/ML injection    LANTUS SOLOSTAR    15 mL    Inject 40 Units Subcutaneous At Bedtime    Type 2 diabetes mellitus with hyperglycemia, without long-term current use of insulin (H)       insulin pen needle 32G X 6 MM    NOVOFINE    100 each    Use once daily or as directed for Lantus Solostar.    Type 2 diabetes mellitus with stage 2 chronic kidney disease, with long-term current use of insulin (H)       lisinopril 10 MG tablet    PRINIVIL/ZESTRIL    90 tablet    Take 1 tablet (10 mg) by mouth daily    Benign essential hypertension       metFORMIN 500 MG 24 hr tablet    GLUCOPHAGE-XR    90 tablet    Take 1 tablet (500 mg) by mouth every morning (before breakfast)    Type 2 diabetes mellitus with hyperglycemia, without long-term current use of insulin (H)       ONETOUCH BASIC SYSTEM w/Device Kit     1 kit    Use to test blood sugar 3-4 times daily or as directed. (Please dispense LifeScan or preferred brand by insurance)    Type 2 diabetes mellitus without complication (H)       ONETOUCH LANCETS Misc     100 each    Use to test blood sugars 3-4 times daily or as directed. (Please dispense LifeScan or preferred brand by insurance)    Type 2 diabetes mellitus without complication (H)

## 2018-05-14 NOTE — PROGRESS NOTES
Subjective: 85-year-old with type 2 diabetes, hypertension, stage II chronic kidney disease presents for management of chronic disease.  His A1c increased from 8.5% in May 2017, 2 greater than 12% in January 2018.  She initiated Lantus insulin in February 2018.  He titrated from 10 units up to 38 units over the past 2 months.  His fasting glucoses have not changed significantly still ranging from 2042305.  He denies any blood sugar readings less than 150.  No hypoglycemic symptoms.  He is unable to take more than 500 mg of metformin per day due to loose stools with any higher dose.  He takes glipizide 5 mg about 30 minutes before his 2 largest meals a day.  He cannot estimate how often he forgets but thinks he takes his medication almost all the time.  As far as his hypertension continues on lisinopril without dizziness lightheadedness or cough.    Review of systems: No recent illness.  No respiratory symptoms.  No chest pain episodes.  No racing heart or palpitations.  No skin wounds or paresthesias.  Remainder the review of systems outlined above    Exam  Vitals are as above with his blood pressure goal  General: He is alert no distress relates his own history needs some prompting for medication doses  HEENT head is nor cephalic atraumatic eyes clear nodular noninjected moist moist membranes neck is free of adenopathy  Cardiovascular: Regular rate and rhythm  Lungs are clear with fair air exchange bilaterally  Abdomen soft and nontender  Extremities Free of edema feet are free of a skin wounds.  Normal monofilament exam throughout both feet    Assessment and plan  1.  Type 2 diabetes: A1c today 11.7% down from 12.8% in January.  Increase Lantus insulin from 38-40 units each day.  I am concerned in reviewing his morning blood glucoses that there is been no significant change despite the increase in Lantus insulin.  I have asked that he meet with our pharmacist to review injection technique dosing and medication  adherence.  I have also asked that he meet with a diabetic educator for further education monitoring and insulin titration.  He will call with blood glucose readings in 1 week.  We will continue to follow closely and titrate medications up.  He will see me no later than 4 weeks  2.  Hypertension: Well-controlled continue lisinopril.  GFR today is 75 with normal electrolytes.  Repeat basic metabolic profile no later than May 2019

## 2018-05-14 NOTE — NURSING NOTE
Chief Complaint   Patient presents with     Diabetes     follow up on readings.     Refill Request     testing strips.      Medication Reconciliation     completed       /75  Pulse 69  Temp 97.9  F (36.6  C) (Oral)  Wt 208 lb (94.3 kg)  SpO2 95%  BMI 28.01 kg/m2    BP recheck: 138/73

## 2018-05-14 NOTE — PATIENT INSTRUCTIONS
Increase your Lantus to 40 units today      Recommend meeting with our pharmacist to ensure correct use of insulin and diabetic medications      Recommend meeting with diabetes educator Sometime in the next 2-4 weeks      In the morning before you eat your goal blood sugar is between 80 and 160      After you eat your goal blood sugar is less than 260      We will continue to adjust her medications until your blood sugars are frequently in this goal range      Please return to see Dr. Gutierrez in 4-6 weeks earlier as needed.      Referral for Diabetes Educator along with OV notes and labs faxed to  [facility]  [phone]  Clinic will contact patient to schedule appointment.    7.18.18 DM Ed HE consult notes 6.27.18 to Dr. Gutierrez. ALBERT Huitron

## 2018-05-15 ENCOUNTER — COMMUNICATION - HEALTHEAST (OUTPATIENT)
Dept: ADMINISTRATIVE | Facility: CLINIC | Age: 83
End: 2018-05-15

## 2018-05-16 ENCOUNTER — OFFICE VISIT (OUTPATIENT)
Dept: PHARMACY | Facility: CLINIC | Age: 83
End: 2018-05-16
Payer: MEDICARE

## 2018-05-16 DIAGNOSIS — E11.65 TYPE 2 DIABETES MELLITUS WITH HYPERGLYCEMIA, WITHOUT LONG-TERM CURRENT USE OF INSULIN (H): Primary | ICD-10-CM

## 2018-05-16 RX ORDER — METFORMIN HCL 500 MG
TABLET, EXTENDED RELEASE 24 HR ORAL
COMMUNITY
Start: 2018-05-16 | End: 2018-09-06 | Stop reason: ALTCHOICE

## 2018-05-16 RX ORDER — VIT A/VIT C/VIT E/ZINC/COPPER 7160-113
1 TABLET, DELAYED RELEASE (ENTERIC COATED) ORAL 2 TIMES DAILY
COMMUNITY
Start: 2018-05-16

## 2018-05-16 NOTE — PROGRESS NOTES
"S: Seen today per referral of Dr. Gutierrez for insulin review and teaching.       Scheduled to see CDE the end of May    Patient presents with home BG log, outlining BG results and dose of insulin administered. Calling in QMonday with results and obtains further instruction    Reports no issues with missed doses with this system. Administers insulin every evening.     No difficulty seeing numbers on insulin pen.     No dexterity issues in handling insulin pen.     Patient's prescription insurance through [employer]. No cost issues. Medicines delivered to home which is convenient.    Using new pen needle each time.     Injecting into same area of abdomen each time    Does not know how to prime insulins pen    States has not yet seen change in blood sugars since starting insulin. No concerns for hypoglycemic events.     O:     Patient Active Problem List   Diagnosis     Long-term insulin use in type 2 diabetes (H)     Cerebral vascular disease     S/P carotid endarterectomy     Hypertension     Prostate cancer (H)     Hyperlipidemia LDL goal <100     Chronic renal disease, stage II         A:     From patient report today, review of log and confirmed through refill dates provided by mail order pharmacy, no concerns for adherence    Technique for insulin injections appropriate. Only areas for improvement include adding priming or \"safety check\" to each injection to ensure dosing as accurate as possible and concern for lack of new injection site each day. Likely not impacting absorption at this point but long term could see tissue damage and change in absorption.    Lack of efficacy could be due to insulin resistance     Patient with good record keeping. While first time meeting patient, impression that patient could self titrate insulin.     Plan:     Patient agrees to rotate injection site daily and to prime insulin pen prior to each dose.     Will discuss self titration strategy with Dr. Gutierrez      Options for treatment " and/or follow-up care were reviewed with the patient. Elie was engaged and actively involved in the decision making process. He verbalized understanding of the options discussed and was satisfied with the final plan. Patient was provided with written instructions/medication list via AVS.    Dr. Gutierrez was provided our recommendations via routed note and Dr. Burgess was available for supervision during this visit and is the authorizing prescriber for this visit through the pharmacist collaborative practice agreement.    Thank you for the opportunity to participate in the care of this patient.  Anais Sanchez, Pharm.D.  [facility]: [phone]

## 2018-05-16 NOTE — MR AVS SNAPSHOT
After Visit Summary   5/16/2018    Elie Martinez    MRN: [id number]           Patient Information     Date Of Birth          9/8/1932        Visit Information        Provider Department      5/16/2018 11:00 AM Anais Sanchez RPH [facility]        Today's Diagnoses     Type 2 diabetes mellitus with hyperglycemia, without long-term current use of insulin (H)    -  1       Follow-ups after your visit        Your next 10 appointments already scheduled     Jun 25, 2018  9:00 AM CDT   Return Visit with Jhon Gutierrez MD   [facility] ([facility])    [address] [place] [address]   [phone]              Who to contact     Please call your clinic at [phone] to:    Ask questions about your health    Make or cancel appointments    Discuss your medicines    Learn about your test results    Speak to your doctor            Additional Information About Your Visit        Care EveryWhere ID     This is your Care EveryWhere ID. This could be used by other organizations to access your [place] medical records  [id number]         Blood Pressure from Last 3 Encounters:   05/14/18 138/73   04/09/18 133/74   03/12/18 128/76    Weight from Last 3 Encounters:   05/14/18 208 lb (94.3 kg)   04/09/18 209 lb (94.8 kg)   03/12/18 214 lb (97.1 kg)              Today, you had the following     No orders found for display         Today's Medication Changes          These changes are accurate as of 5/16/18 11:59 PM.  If you have any questions, ask your nurse or doctor.               These medicines have changed or have updated prescriptions.        Dose/Directions    LANTUS SOLOSTAR 100 UNIT/ML injection   This may have changed:  how much to take   Used for:  Type 2 diabetes mellitus with hyperglycemia, without long-term current use of insulin (H)   Generic drug:  insulin glargine        Dose:  45 Units   Inject 45 Units Subcutaneous At Bedtime   Refills:  0        metFORMIN 500 MG 24 hr tablet   Commonly known as:  GLUCOPHAGE-XR   This may have changed:    - how much to take  - how to take this  - when to take this  - additional instructions   Used for:  Type 2 diabetes mellitus with hyperglycemia, without long-term current use of insulin (H)        Take 1/2 tablet by mouth twice daily   Refills:  0                Primary Care Provider Office Phone # Fax #    Jhon Gutierrez [phone]       [facility] [address]        Equal Access to Services     [facility] : Hadii aad ku hadasho Soomaali, waaxda luqadaha, qaybta kaalmada adeegyada, waxay idiin hayaan adejorden denise . So [facility] [phone].    ATENCIÓN: Si habla español, tiene a ponce disposición servicios gratuitos de asistencia lingüística. [facility] [phone].    We comply with applicable federal civil rights laws and [place] laws. We do not discriminate on the basis of race, color, national origin, age, disability, sex, sexual orientation, or gender identity.            Thank you!     Thank you for choosing [facility]  for your care. Our goal is always to provide you with excellent care. Hearing back from our patients is one way we can continue to improve our services. Please take a few minutes to complete the written survey that you may receive in the mail after your visit with us. Thank you!             Your Updated Medication List - Protect others around you: Learn how to safely use, store and throw away your medicines at www.disposemymeds.org.          This list is accurate as of 5/16/18 11:59 PM.  Always use your most recent med list.                   Brand Name Dispense Instructions for use Diagnosis    aspirin 325 MG tablet     90 tablet    Take 1 tablet (325 mg) by mouth daily    Cerebral vascular disease       atorvastatin 40 MG tablet    LIPITOR    90 tablet    Take 1 tablet (40 mg) by mouth daily    Cerebral vascular disease       blood glucose monitoring  test strip    ONETOUCH TEST STRIPS    100 each    Use to test blood sugar 2 times daily or as directed. (Please dispense preferred brand by insurance)    Type 2 diabetes mellitus with hyperglycemia, without long-term current use of insulin (H)       glipiZIDE 5 MG tablet    GLUCOTROL    180 tablet    Take 1 tablet (5 mg) by mouth 2 times daily (before meals)    Type 2 diabetes mellitus with hyperglycemia, unspecified long term insulin use status (H)       ICAPS AREDS FORMULA Tabs      Take 1 tablet by mouth 2 times daily        insulin pen needle 32G X 6 MM    NOVOFINE    100 each    Use once daily or as directed for Lantus Solostar.    Type 2 diabetes mellitus with stage 2 chronic kidney disease, with long-term current use of insulin (H)       LANTUS SOLOSTAR 100 UNIT/ML injection   Generic drug:  insulin glargine      Inject 45 Units Subcutaneous At Bedtime    Type 2 diabetes mellitus with hyperglycemia, without long-term current use of insulin (H)       lisinopril 10 MG tablet    PRINIVIL/ZESTRIL    90 tablet    Take 1 tablet (10 mg) by mouth daily    Benign essential hypertension       metFORMIN 500 MG 24 hr tablet    GLUCOPHAGE-XR     Take 1/2 tablet by mouth twice daily    Type 2 diabetes mellitus with hyperglycemia, without long-term current use of insulin (H)       ONETOUCH BASIC SYSTEM w/Device Kit     1 kit    Use to test blood sugar 3-4 times daily or as directed. (Please dispense LifeScan or preferred brand by insurance)    Type 2 diabetes mellitus without complication (H)       ONETOUCH LANCETS Misc     100 each    Use to test blood sugars 3-4 times daily or as directed. (Please dispense LifeScan or preferred brand by insurance)    Type 2 diabetes mellitus without complication (H)

## 2018-05-21 ENCOUNTER — TELEPHONE (OUTPATIENT)
Dept: FAMILY MEDICINE | Facility: CLINIC | Age: 83
End: 2018-05-21

## 2018-05-21 NOTE — TELEPHONE ENCOUNTER
[facility] phone call message- general phone call:    Reason for call: Patient called to report his diabetes results:     5/15 @ 7am 227, between 8:30pm - 9:30pm 315  5/16 in the morning 265, night 299  5/17 in the morning 259, night 315  5/18 in the morning 290, night 301  5/19 in the morning 283, night 299  5/20 in the morning 279, night 279  5/21 this morning 229      Return call needed: Yes    OK to leave a message on voice mail? Yes    Primary language: English      needed? No    Call taken on May 21, 2018 at 9:08 AM by Tayler Patel

## 2018-05-22 NOTE — TELEPHONE ENCOUNTER
Fasting glucoses continue well above goal (goal ).    Patient currently using Lantus 40 units daily.  I saw patient one week ago, and was concerned that insulin administration technique or frequency may not be correct as I have not seen a significant change in fasting glucose with insulin titration over the past 2 months.  He it appears he had an appt with pharmacist on 5/16.  I will ask pharmacist (Dr. Sanchez) to review these latest glucose readings and titrate insulin/medication advise.    I can be reached 5/22 by pager or cell phone.    If Dr. Sanchez is not available to advise prior on or before 5/23, please return this phone note to me (at which time I will likely increase Lantus insulin to 45 units, and have patient call with glucoses again next Monday)    Jhon Gutierrez MD

## 2018-05-23 NOTE — TELEPHONE ENCOUNTER
Patient continues on 41 units of Lantus nightly. Has started priming insulin pen prior to each dose. No difficulties there. Also has continued rotating injection site, as instructed. No pain or irritation at new injection sites.     Other than single morning blood sugar of 159 yesterday, other blood sugars per patient report have been above goal, mostly in 200s.     Relayed Dr. Carson recommendation of increasing to 45 units starting this evening. Patient agreeable. Plan to call in next Monday with blood sugars.       Anais Stone, PharmD  [facility]  Phone: [phone]  May 23, 2018 at 2:40 PM

## 2018-05-25 NOTE — TELEPHONE ENCOUNTER
During check in with patient Mondays/Tuesdays, plan to provide instruction for dose titration on Thursday if blood sugars still above goal.    As not stressed during previous encounters as BG have remained high, need to review how to adjust insulin if experienced hypoglycemic episode. Would recommend decreasing insulin to most recent previous dose.

## 2018-05-30 ENCOUNTER — OFFICE VISIT - HEALTHEAST (OUTPATIENT)
Dept: EDUCATION SERVICES | Facility: CLINIC | Age: 83
End: 2018-05-30

## 2018-05-30 ENCOUNTER — TELEPHONE (OUTPATIENT)
Dept: FAMILY MEDICINE | Facility: CLINIC | Age: 83
End: 2018-05-30

## 2018-05-30 DIAGNOSIS — E11.9 DIABETES (H): ICD-10-CM

## 2018-05-30 NOTE — TELEPHONE ENCOUNTER
[facility] Family Medicine phone call message- general phone call:    Reason for call: Patient called to report his glucose readings 5/23 - 5/28 with 41 units - 45 units:    5/23 289 morning, 290 night  5/24 263 morning, 275 night   5/25 290 morning, 224 night   5/26 229 morning, 219 night   5/27 271 morning, 309 night   5/28 290 morning, 626 night       Return call needed: Yes    OK to leave a message on voice mail? Yes    Primary language: English      needed? No    Call taken on May 30, 2018 at 9:21 AM by Tayler Patel

## 2018-05-30 NOTE — TELEPHONE ENCOUNTER
Spoke to Joanna- Diabetic Educator at [facility]. Initial thought was to add mealtime insulin for coverage since even with Lantus 45 units at bedtime, fasting bs were ranging 220-289 and postprandial bs were 200-300. After left phone message, Joanna had reviewed Juarez's insulin administration technique and found he may have been administering incorrectly. Not removing the second plastic shield of pen needle. Would like to hold off on starting meal time insulin and decrease Lantus to 25 units. Elie will be following up with Joanna in one week.   Will further discuss this with Anais Stone [facility].    After discussion with Anais, would feel more comfortable with decreasing Lantus to 15 units at bedtime. Called Joanna to inform her of this information. She would like to speak with Anais. Will route encounter to Anais to see what continued Lantus dose should be.  Denis PARNELL

## 2018-05-30 NOTE — TELEPHONE ENCOUNTER
Based on previous initial starting dose, recommended 15 units daily. Relayed to Joanna. She plans to see patient again in 10 days. Instructed patient to call in to clinic with blood sugars on Monday per usual schedule. Can titrate at this time if BG continue to be above goal.

## 2018-05-30 NOTE — TELEPHONE ENCOUNTER
[facility] Family Medicine phone call message- medication clarification/question:    Full Medication Name:    Strength:     Have you contacted your pharmacy about this refill request?     If  Yes,  which pharmacy?    When did you contact the pharmacy?    Additional comments/concerns from call to pharmacy:    Reason for call to clinic: She states she is calling to speak to the Nurses about Patient and that the patient is there now. Send a message to both Nurses but no response so told her if it was okay that I send a message and she said well Patient is there but she is okay with that. She has questions about adding novolog insulin and discontinuing glipizide. Please call and advise.       OK to leave a message on voice mail?     Primary language: English      needed? No    Call taken on May 30, 2018 at 1:38 PM by Kamran Salamanca

## 2018-06-12 ENCOUNTER — OFFICE VISIT - HEALTHEAST (OUTPATIENT)
Dept: EDUCATION SERVICES | Facility: CLINIC | Age: 83
End: 2018-06-12

## 2018-06-12 ENCOUNTER — TELEPHONE (OUTPATIENT)
Dept: FAMILY MEDICINE | Facility: CLINIC | Age: 83
End: 2018-06-12

## 2018-06-12 DIAGNOSIS — E11.9 TYPE 2 DIABETES MELLITUS (H): ICD-10-CM

## 2018-06-12 NOTE — TELEPHONE ENCOUNTER
[facility] Family Medicine phone call message- general phone call:    Reason for call: Patient called to report his glucose readings 6/4 - 6/12 with 15 units:    6/4 188 morning, 298 night  6/5 247 morning, 279 night  6/6 190 morning, 217 night  6/7 169 morning, 232 night  6/8 149 morning, 252 night  6/9 206 morning, 235 night  6/10 134 morning, 260 night  6/11 219 morning, 245 night  6/12 167 morning only at this time        Return call needed: Yes    OK to leave a message on voice mail? Yes    Primary language: English      needed? No    Call taken on June 12, 2018 at 9:10 AM by Tayler Patel

## 2018-06-25 ENCOUNTER — OFFICE VISIT (OUTPATIENT)
Dept: FAMILY MEDICINE | Facility: CLINIC | Age: 83
End: 2018-06-25
Payer: MEDICARE

## 2018-06-25 ENCOUNTER — COMMUNICATION - HEALTHEAST (OUTPATIENT)
Dept: ADMINISTRATIVE | Facility: CLINIC | Age: 83
End: 2018-06-25

## 2018-06-25 VITALS
HEART RATE: 83 BPM | DIASTOLIC BLOOD PRESSURE: 74 MMHG | BODY MASS INDEX: 28.42 KG/M2 | OXYGEN SATURATION: 97 % | HEIGHT: 72 IN | WEIGHT: 209.8 LBS | SYSTOLIC BLOOD PRESSURE: 146 MMHG | TEMPERATURE: 97.4 F

## 2018-06-25 DIAGNOSIS — Z79.4 TYPE 2 DIABETES MELLITUS WITH HYPERGLYCEMIA, WITH LONG-TERM CURRENT USE OF INSULIN (H): Primary | ICD-10-CM

## 2018-06-25 DIAGNOSIS — I10 BENIGN ESSENTIAL HYPERTENSION: ICD-10-CM

## 2018-06-25 DIAGNOSIS — E11.65 TYPE 2 DIABETES MELLITUS WITH HYPERGLYCEMIA, WITH LONG-TERM CURRENT USE OF INSULIN (H): Primary | ICD-10-CM

## 2018-06-25 NOTE — PROGRESS NOTES
Subjective: 85-year-old male with type 2 diabetes.  Over the past 2 months we have been working to improve his glycemic control.  He has met with a diabetic educator.  Diabetic educator found that he was not removing a second needle Which was likely reducing his insulin administration of.  His Lantus dose was cut down after this revelation despite high blood sugars.  The insulin was re-titrated he is not currently at 22 units of Lantus.  He has been calling his blood sugars once a week.  There had been a downward trend of his fasting blood sugars in response to the improved technique.  Today he brings in only postprandial readings ranging from 199 to 290 for the most part.  There is one solitary reading under 200 which is 130.  No hypoglycemic symptoms.  He is using Lantus insulin 22 units, and a glipizide twice daily.  His metformin dose is maximized at 500 mg a day due to GI intolerance.  He has met with diabetic education through the [employer] system (Mary Polanco)    Review of systems: No hypoglycemic readings or episodes.  No chest pain shortness of breath or palpitations.  Good energy level.    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  He does not smoke.    Exam  Repeat blood pressure slightly above goal  HEENT head is normal cephalic and atraumatic eyes sclera nonicteric noninjected moist mucous membranes neck is supple free of adenopathy  Cardia vascular: Regular rate rhythm  Respiratory lungs are clear with fair air exchange bilaterally  Abdomen soft nontender  Extremities Free of edema    Assessment and plan  1.  Uncontrolled type 2 diabetes.  Most recent A1c May 14, 2018 was 11.7%.   He initiated Lantus insulin for the first time in February 2018.  He titrated from 10 units up to 38 units in March and April of.  I a pending Administration problem was identified in late May by diabetes education.  Lantus insulin now at 22  units.  I recommend he discontinue glipizide.  He will start NovoLog Lantus with a 10, 5 units before his 3 meals per day.  He will check his blood glucoses fasting, 2 hours after 1 of his meals, and before bed.  He will bring glucose readings to his diabetic education and physician visits.  He is meeting with diabetic education on Wednesday.  I spoke with the diabetic to continue to work together to titrate educator by phone today to an A1c goal of 8.5% or less    2.  Hypertension: Blood pressure was under better control in May with the current regimen.  Continue lisinopril 10 mg per day for now.  Titrate to goal of 130/80

## 2018-06-25 NOTE — MR AVS SNAPSHOT
After Visit Summary   6/25/2018    Elie Martinez    MRN: [id number]           Patient Information     Date Of Birth          9/8/1932        Visit Information        Provider Department      6/25/2018 9:00 AM Jhon Gutierrez MD [facility]        Today's Diagnoses     Type 2 diabetes mellitus with hyperglycemia, with long-term current use of insulin (H)    -  1      Care Instructions    - STOP taking glipizide.  - Check blood sugars 3 times a day: fasting in the morning , before bed, and 2 hours after one other meal.  - Start taking Novolog, 5 units before each meal     Your medication list is printed, please keep this with you, it is helpful to bring this current list to any other medical appointments, the emergency room or hospital.    If you had lab testing today and your results are reassuring or normal they will be be mailed to you within 7 days.     If the lab tests need quick action we will call you with the results.   The phone number we will call with results is # [phone] (home) . If this is not the best number please call our clinic and change the number.    If you need any refills please call your pharmacy and they will contact us.    If you have any further concerns or wish to schedule another appointment you must call our office during normal business hours  [phone] (8-5:00 M-F)  If you have urgent medical questions that cannot wait  you may also call [phone] at any time of day.  If you have a medical emergency please call [phone].    Thank you for coming to [facility].            Follow-ups after your visit        Who to contact     Please call your clinic at [phone] to:    Ask questions about your health    Make or cancel appointments    Discuss your medicines    Learn about your test results    Speak to your doctor            Additional Information About Your Visit        Care EveryWhere ID     This is your Care EveryWhere ID. This could be used by  other organizations to access your [facility] medical records  [id number]        Your Vitals Were     Pulse Temperature Height Pulse Oximetry BMI (Body Mass Index)       83 97.4  F (36.3  C) (Oral) 6' (182.9 cm) 97% 28.45 kg/m2        Blood Pressure from Last 3 Encounters:   06/25/18 146/74   05/14/18 138/73   04/09/18 133/74    Weight from Last 3 Encounters:   06/25/18 209 lb 12.8 oz (95.2 kg)   05/14/18 208 lb (94.3 kg)   04/09/18 209 lb (94.8 kg)              Today, you had the following     No orders found for display         Today's Medication Changes          These changes are accurate as of 6/25/18  9:48 AM.  If you have any questions, ask your nurse or doctor.               Start taking these medicines.        Dose/Directions    insulin aspart 100 UNIT/ML injection   Commonly known as:  NovoLOG FLEXPEN   Used for:  Type 2 diabetes mellitus with hyperglycemia, with long-term current use of insulin (H)   Started by:  Jhon Gutierrez MD        5 units before breakfast, 5 units before lunch, 5 units before dinner   Quantity:  30 mL   Refills:  3         Stop taking these medicines if you haven't already. Please contact your care team if you have questions.     glipiZIDE 5 MG tablet   Commonly known as:  GLUCOTROL   Stopped by:  Jhon Gutierrez MD                Where to get your medicines      These medications were sent to [facility] - [address] [address]     Phone:  [phone]     insulin aspart 100 UNIT/ML injection                Primary Care Provider Office Phone # Fax #    Jhon Gutierrez [phone]       [facility] [address]        Equal Access to Services     [facility] Center[facility]: Hadii tristian ceballos Sojosiane, waaxda luqadaha, qaybta kaalmada adeegyada, carlos hernandez. Suzi [facility] [phone].    ATENCIÓN: Si habla español, tiene a ponce disposición servicios gratuitos de  asistencia lingüística. Gerson al [phone].    We comply with applicable federal civil rights laws and [place] laws. We do not discriminate on the basis of race, color, national origin, age, disability, sex, sexual orientation, or gender identity.            Thank you!     Thank you for choosing [facility]  for your care. Our goal is always to provide you with excellent care. Hearing back from our patients is one way we can continue to improve our services. Please take a few minutes to complete the written survey that you may receive in the mail after your visit with us. Thank you!             Your Updated Medication List - Protect others around you: Learn how to safely use, store and throw away your medicines at www.disposemymeds.org.          This list is accurate as of 6/25/18  9:48 AM.  Always use your most recent med list.                   Brand Name Dispense Instructions for use Diagnosis    aspirin 325 MG tablet     90 tablet    Take 1 tablet (325 mg) by mouth daily    Cerebral vascular disease       atorvastatin 40 MG tablet    LIPITOR    90 tablet    Take 1 tablet (40 mg) by mouth daily    Cerebral vascular disease       blood glucose monitoring test strip    ONETOUCH TEST STRIPS    100 each    Use to test blood sugar 2 times daily or as directed. (Please dispense preferred brand by insurance)    Type 2 diabetes mellitus with hyperglycemia, without long-term current use of insulin (H)       ICAPS AREDS FORMULA Tabs      Take 1 tablet by mouth 2 times daily        insulin aspart 100 UNIT/ML injection    NovoLOG FLEXPEN    30 mL    5 units before breakfast, 5 units before lunch, 5 units before dinner    Type 2 diabetes mellitus with hyperglycemia, with long-term current use of insulin (H)       insulin pen needle 32G X 6 MM    NOVOFINE    100 each    Use once daily or as directed for Lantus Solostar.    Type 2 diabetes mellitus with stage 2 chronic kidney disease, with long-term current use  of insulin (H)       LANTUS SOLOSTAR 100 UNIT/ML injection   Generic drug:  insulin glargine      Inject 22 Units Subcutaneous At Bedtime    Type 2 diabetes mellitus with hyperglycemia, without long-term current use of insulin (H)       lisinopril 10 MG tablet    PRINIVIL/ZESTRIL    90 tablet    Take 1 tablet (10 mg) by mouth daily    Benign essential hypertension       metFORMIN 500 MG 24 hr tablet    GLUCOPHAGE-XR     Take 1/2 tablet by mouth twice daily    Type 2 diabetes mellitus with hyperglycemia, without long-term current use of insulin (H)       ONETOUCH BASIC SYSTEM w/Device Kit     1 kit    Use to test blood sugar 3-4 times daily or as directed. (Please dispense LifeScan or preferred brand by insurance)    Type 2 diabetes mellitus without complication (H)       ONETOUCH LANCETS Misc     100 each    Use to test blood sugars 3-4 times daily or as directed. (Please dispense LifeScan or preferred brand by insurance)    Type 2 diabetes mellitus without complication (H)

## 2018-06-25 NOTE — TELEPHONE ENCOUNTER
[facility] Family Medicine phone call message- patient requesting a refill:    Full Medication Name: FREESTYLE TEST STRIPS    Dose:     Pharmacy confirmed as   [id number] - [place], [place] - [address] AT [facility] [address]  [address] [place] [address]  Phone: [phone] Fax: [phone]    [facility] - [place], [place] - [address] [place] [address]  Phone: [phone] Fax: [phone]    [id number] - [place], [place] - [address] [place], [place] [address]  AT [facility] [address] [place] [address]  Phone: [phone] Fax: [phone]    No: [facility]    Additional Comments:      OK to leave a message on voice mail? Yes    Primary language: English      needed? No    Call taken on June 25, 2018 at 11:05 AM by Cherie Linares

## 2018-06-25 NOTE — PATIENT INSTRUCTIONS
- STOP taking glipizide.  - Check blood sugars 3 times a day: fasting in the morning , before bed, and 2 hours after one other meal.  - Start taking Novolog, 5 units before each meal     Your medication list is printed, please keep this with you, it is helpful to bring this current list to any other medical appointments, the emergency room or hospital.    If you had lab testing today and your results are reassuring or normal they will be be mailed to you within 7 days.     If the lab tests need quick action we will call you with the results.   The phone number we will call with results is # [phone] (home) . If this is not the best number please call our clinic and change the number.    If you need any refills please call your pharmacy and they will contact us.    If you have any further concerns or wish to schedule another appointment you must call our office during normal business hours  [phone] (8-5:00 M-F)  If you have urgent medical questions that cannot wait  you may also call [phone] at any time of day.  If you have a medical emergency please call [phone].    Thank you for coming to [facility].

## 2018-06-26 DIAGNOSIS — Z79.4 TYPE 2 DIABETES MELLITUS WITH HYPERGLYCEMIA, WITH LONG-TERM CURRENT USE OF INSULIN (H): ICD-10-CM

## 2018-06-26 DIAGNOSIS — E11.65 TYPE 2 DIABETES MELLITUS WITH HYPERGLYCEMIA, WITH LONG-TERM CURRENT USE OF INSULIN (H): ICD-10-CM

## 2018-06-27 ENCOUNTER — OFFICE VISIT - HEALTHEAST (OUTPATIENT)
Dept: EDUCATION SERVICES | Facility: CLINIC | Age: 83
End: 2018-06-27

## 2018-06-27 ENCOUNTER — TELEPHONE (OUTPATIENT)
Dept: FAMILY MEDICINE | Facility: CLINIC | Age: 83
End: 2018-06-27

## 2018-06-27 ENCOUNTER — TRANSFERRED RECORDS (OUTPATIENT)
Dept: HEALTH INFORMATION MANAGEMENT | Facility: CLINIC | Age: 83
End: 2018-06-27

## 2018-06-27 DIAGNOSIS — E11.9 DIABETES MELLITUS, TYPE 2 (H): ICD-10-CM

## 2018-06-27 DIAGNOSIS — Z79.4 TYPE 2 DIABETES MELLITUS WITH HYPERGLYCEMIA, WITH LONG-TERM CURRENT USE OF INSULIN (H): Primary | ICD-10-CM

## 2018-06-27 DIAGNOSIS — E11.65 TYPE 2 DIABETES MELLITUS WITH HYPERGLYCEMIA, WITH LONG-TERM CURRENT USE OF INSULIN (H): Primary | ICD-10-CM

## 2018-06-27 NOTE — TELEPHONE ENCOUNTER
PA request for Freestyle. Likely alternative brand covered by insurance. Resent Rx for new supplies, pharmacy to determine brand covered. Patient requested 90 day supply. Outlined this in Rx.

## 2018-06-27 NOTE — TELEPHONE ENCOUNTER
Prior Authorization needed on:  6/26/18    Medication:  FreeStyle Test [facility]    Pharmacy confirmed as   [id number] - [place], [place] - [address] [facility] [address]  [address]  [facility] [address]  Phone: [phone] Fax: [phone]  : Yes    Anais Sanchez June 27, 2018 at 10:04 AM

## 2018-07-02 ENCOUNTER — TELEPHONE (OUTPATIENT)
Dept: FAMILY MEDICINE | Facility: CLINIC | Age: 83
End: 2018-07-02

## 2018-07-02 DIAGNOSIS — Z79.4 TYPE 2 DIABETES MELLITUS WITH STAGE 2 CHRONIC KIDNEY DISEASE, WITH LONG-TERM CURRENT USE OF INSULIN (H): ICD-10-CM

## 2018-07-02 DIAGNOSIS — N18.2 TYPE 2 DIABETES MELLITUS WITH STAGE 2 CHRONIC KIDNEY DISEASE, WITH LONG-TERM CURRENT USE OF INSULIN (H): ICD-10-CM

## 2018-07-02 DIAGNOSIS — E11.22 TYPE 2 DIABETES MELLITUS WITH STAGE 2 CHRONIC KIDNEY DISEASE, WITH LONG-TERM CURRENT USE OF INSULIN (H): ICD-10-CM

## 2018-07-02 NOTE — TELEPHONE ENCOUNTER
[facility] Family Medicine phone call message- general phone call:    Reason for call: FYI: Calling to update blood sugars for Dr. Gutierrez. First morning 6-7am=22 units of insulin and taking novolog 5 units 3 times a day date 6/26 then on 6/27 reported to diabete clinic they change some things morning reading was 215 second one 2:30-4pm 229, and third is 8-9 pm 172. He states they change is diabete unit to 24 units and the novolog is 6 units. Then on 6/28 it was 190 in the morning 220 afternoon, and  158 evening. On 6/29 morning was 185, afternoon 141, and evening 201 (this was still at 24 units).On 6/30 165 morning, 265 afternoon, 240 evening. On 7/1 it was 181, 183 afternoon, and 190 night time. Then today 7/2 it was 181 morning, 223 afternoon.   o  Return call needed: No    OK to leave a message on voice mail?     Primary language: English      needed? No    Call taken on July 2, 2018 at 1:21 PM by Kamran Salamanca

## 2018-07-04 NOTE — TELEPHONE ENCOUNTER
Will titrate insulin dose twice weekly (likely Monday and Thursdays) via phone review of glucoses.  Either me or Pharmacist Dr. RUBIO can review and titrate his insulin dose.    Goal fasting , but will be satisfied with most glucoses just below 160.    Plan to not increase Lantus insulin dose beyond 70 units prior to office visit.    Patient will call next Tuesday with glucoses.    FREDIS     normal

## 2018-07-09 ENCOUNTER — TELEPHONE (OUTPATIENT)
Dept: FAMILY MEDICINE | Facility: CLINIC | Age: 83
End: 2018-07-09

## 2018-07-09 DIAGNOSIS — E11.22 TYPE 2 DIABETES MELLITUS WITH STAGE 2 CHRONIC KIDNEY DISEASE, WITH LONG-TERM CURRENT USE OF INSULIN (H): ICD-10-CM

## 2018-07-09 DIAGNOSIS — N18.2 TYPE 2 DIABETES MELLITUS WITH STAGE 2 CHRONIC KIDNEY DISEASE, WITH LONG-TERM CURRENT USE OF INSULIN (H): ICD-10-CM

## 2018-07-09 DIAGNOSIS — Z79.4 TYPE 2 DIABETES MELLITUS WITH STAGE 2 CHRONIC KIDNEY DISEASE, WITH LONG-TERM CURRENT USE OF INSULIN (H): ICD-10-CM

## 2018-07-23 ENCOUNTER — TELEPHONE (OUTPATIENT)
Dept: FAMILY MEDICINE | Facility: CLINIC | Age: 83
End: 2018-07-23

## 2018-07-23 DIAGNOSIS — Z79.4 TYPE 2 DIABETES MELLITUS WITH HYPERGLYCEMIA, WITH LONG-TERM CURRENT USE OF INSULIN (H): ICD-10-CM

## 2018-07-23 DIAGNOSIS — E11.65 TYPE 2 DIABETES MELLITUS WITH HYPERGLYCEMIA, WITH LONG-TERM CURRENT USE OF INSULIN (H): ICD-10-CM

## 2018-07-23 NOTE — TELEPHONE ENCOUNTER
[facility] Family Medicine phone call message- general phone call:    Reason for call: Caller is calling to give update on blood sugar readings. 7 units of novolog, 26 units of insulin    7/17 - 166 in am     190 at 2 pm            280  At 8 pm  7/18-  208 in am      243 in afternoon    238 in evening  7/19 - 209 in am      210 at 2-3 pm        204 in evening  7/20-  179 in am      220 in afternoon    210 in evening  7/21-   181 in am     198 in afternoon    240 in evening  7/22-   154 in am     199 in afternoon    157 in evening  7/23 - 165 in am.    Return call needed: No    OK to leave a message on voice mail? Yes    Primary language: English      needed? No    Call taken on July 23, 2018 at 9:28 AM by Cherie Linares

## 2018-07-23 NOTE — TELEPHONE ENCOUNTER
Blood glucoses reviewed.  Fasting glucoses improved but above goal.  Post prandials improved, 6/7 at goal.    Fasting goal <150  Post prandial goal <250    Called to speak with patient, no answer. As I could not speak with him, I left a message to continue same insulin dose (Lantus 26 units once daily, Novolog 7 units 3 times daily with meals).  F/U with glucose readings with DM educator as scheduled in the coming days.    FREDIS

## 2018-07-23 NOTE — TELEPHONE ENCOUNTER
[facility] Family Medicine phone call message- patient requesting a refill:    Full Medication Name: Freestyle test strips    Dose:     Pharmacy confirmed as   [id number] - [place], [place] - [address] [facility] AT [facility] [address]  [address] [place] [address]  Phone: [phone] Fax: [phone]    [facility]  for [place], [place] - [address] [place], [place] [address] [facility] [address]  Phone: [phone] Fax: [phone]    [facility] - [place], [place] - [address] [place], [place] [address] [facility] [address] [place] [address]  Phone: [phone] Fax: [phone]  : Yes    Additional Comments:      OK to leave a message on voice mail? Yes    Primary language: English      needed? No    Call taken on July 23, 2018 at 9:39 AM by Cherie Linares

## 2018-07-25 ENCOUNTER — OFFICE VISIT - HEALTHEAST (OUTPATIENT)
Dept: EDUCATION SERVICES | Facility: CLINIC | Age: 83
End: 2018-07-25

## 2018-07-25 ENCOUNTER — TRANSFERRED RECORDS (OUTPATIENT)
Dept: HEALTH INFORMATION MANAGEMENT | Facility: CLINIC | Age: 83
End: 2018-07-25

## 2018-07-25 DIAGNOSIS — E11.9 DIABETES MELLITUS, TYPE 2 (H): ICD-10-CM

## 2018-07-30 ENCOUNTER — TELEPHONE (OUTPATIENT)
Dept: FAMILY MEDICINE | Facility: CLINIC | Age: 83
End: 2018-07-30

## 2018-07-30 NOTE — TELEPHONE ENCOUNTER
Please call patient with recs:    Continue current INSULIN doses:  Lantus 28 units once daily.  Novolog 7 units 3 times daily with meals    Call to schedule an appointment with Dr. Gutierrez for sometime in August (certainly no later than early September)

## 2018-07-30 NOTE — TELEPHONE ENCOUNTER
[facility] Family Medicine phone call message- general phone call:    Reason for call: Blood sugar readings  7units & 28 units lantus  Date:         7/23   165 in am    201 in afternoon   207 at night  7/24   204 in am   180 in afternoon   203 at night  7/25   166 in am   199 in afternoon   218 at night  7/26   121 in am   158 in afternoon   183 at night  727    179 in am   189 in afternoon    201 at night  7/28   129 in am   179 in afternoon   124 at night   7/29   155 in am   205 in afternoon    208 at night    Return call needed: No    OK to leave a message on voice mail? Yes    Primary language: English      needed? No    Call taken on July 30, 2018 at 2:56 PM by Cherie Linares

## 2018-08-03 DIAGNOSIS — N18.2 TYPE 2 DIABETES MELLITUS WITH STAGE 2 CHRONIC KIDNEY DISEASE, WITH LONG-TERM CURRENT USE OF INSULIN (H): ICD-10-CM

## 2018-08-03 DIAGNOSIS — Z79.4 TYPE 2 DIABETES MELLITUS WITH STAGE 2 CHRONIC KIDNEY DISEASE, WITH LONG-TERM CURRENT USE OF INSULIN (H): ICD-10-CM

## 2018-08-03 DIAGNOSIS — E11.22 TYPE 2 DIABETES MELLITUS WITH STAGE 2 CHRONIC KIDNEY DISEASE, WITH LONG-TERM CURRENT USE OF INSULIN (H): ICD-10-CM

## 2018-08-03 DIAGNOSIS — Z79.4 TYPE 2 DIABETES MELLITUS WITH HYPERGLYCEMIA, WITH LONG-TERM CURRENT USE OF INSULIN (H): ICD-10-CM

## 2018-08-03 DIAGNOSIS — E11.65 TYPE 2 DIABETES MELLITUS WITH HYPERGLYCEMIA, WITH LONG-TERM CURRENT USE OF INSULIN (H): ICD-10-CM

## 2018-08-03 NOTE — TELEPHONE ENCOUNTER
Pt called back stating that [facility] shows that he is to use the pen needles once a day but he actually uses 4 needles a day. States if he is supposed to be changing it 4 times a day he needs a new prescription sent to pharmacy. Please call and advise.

## 2018-08-03 NOTE — TELEPHONE ENCOUNTER
[facility] Family Medicine phone call message- medication clarification/question:    Full Medication Name: insulin pen needle (NOVOFINE) 32G X 6 MM       insulin aspart (NOVOLOG FLEXPEN) 100 UNIT/ML injection    Question: Pt calling in requesting refills on medications above.      Pharmacy confirmed as      [facility]  FOR [address]: Yes    OK to leave a message on voice mail? Yes    Primary language: English      needed? No    Call taken on August 3, 2018 at 9:18 AM by Luz Marina Stover

## 2018-08-06 ENCOUNTER — TELEPHONE (OUTPATIENT)
Dept: FAMILY MEDICINE | Facility: CLINIC | Age: 83
End: 2018-08-06

## 2018-08-06 DIAGNOSIS — N18.2 TYPE 2 DIABETES MELLITUS WITH STAGE 2 CHRONIC KIDNEY DISEASE, WITH LONG-TERM CURRENT USE OF INSULIN (H): ICD-10-CM

## 2018-08-06 DIAGNOSIS — E11.65 TYPE 2 DIABETES MELLITUS WITH HYPERGLYCEMIA, WITH LONG-TERM CURRENT USE OF INSULIN (H): ICD-10-CM

## 2018-08-06 DIAGNOSIS — E11.22 TYPE 2 DIABETES MELLITUS WITH STAGE 2 CHRONIC KIDNEY DISEASE, WITH LONG-TERM CURRENT USE OF INSULIN (H): ICD-10-CM

## 2018-08-06 DIAGNOSIS — Z79.4 TYPE 2 DIABETES MELLITUS WITH STAGE 2 CHRONIC KIDNEY DISEASE, WITH LONG-TERM CURRENT USE OF INSULIN (H): ICD-10-CM

## 2018-08-06 DIAGNOSIS — Z79.4 TYPE 2 DIABETES MELLITUS WITH HYPERGLYCEMIA, WITH LONG-TERM CURRENT USE OF INSULIN (H): ICD-10-CM

## 2018-08-06 NOTE — TELEPHONE ENCOUNTER
[facility] Medicine phone call message- general phone call:    Reason for call: FYI: Calling to update Dr. Gutierrez on his blood levels from 7/30-8/5. On 7/30 in the morning it was 179, 155 in the afternoon, and 178 in the evening. He states he is on 7 units novolog daily and lantus 26 units. On 7/31 in the morning 202, afternoon 105, evening 201. 8/1 in the morning 171, afternoon 171, and evening 187. 8/2 morning 133, afternoon 143 and 141 at night. 8/3 morning 155, afternoon 99, evening 211. 8/5 morning 201, afternoon 201 and evening 99.     Return call needed: No    OK to leave a message on voice mail?     Primary language: English      needed? No    Call taken on August 6, 2018 at 9:25 AM by Kamran Salamanca

## 2018-08-06 NOTE — TELEPHONE ENCOUNTER
Patient is calling to request refill below he states pharmacy is saying he is using more than direction and he is running low and can't get refills. He states he uses 3 flex pens a day of novolog and 1 of the 32G insulin pen needles. He would need more refills with new direction sent to [facility]. Please call and advise.

## 2018-08-07 NOTE — TELEPHONE ENCOUNTER
Please advise patient to continue current insulin doses until his appointment with me next week.    Bring glucometer to appointment.     FREDIS

## 2018-08-13 ENCOUNTER — OFFICE VISIT (OUTPATIENT)
Dept: FAMILY MEDICINE | Facility: CLINIC | Age: 83
End: 2018-08-13
Payer: MEDICARE

## 2018-08-13 VITALS
HEART RATE: 68 BPM | SYSTOLIC BLOOD PRESSURE: 157 MMHG | BODY MASS INDEX: 28.75 KG/M2 | WEIGHT: 212 LBS | RESPIRATION RATE: 20 BRPM | OXYGEN SATURATION: 98 % | TEMPERATURE: 98.5 F | DIASTOLIC BLOOD PRESSURE: 78 MMHG

## 2018-08-13 DIAGNOSIS — I10 BENIGN ESSENTIAL HYPERTENSION: ICD-10-CM

## 2018-08-13 DIAGNOSIS — E11.65 TYPE 2 DIABETES MELLITUS WITH HYPERGLYCEMIA, WITHOUT LONG-TERM CURRENT USE OF INSULIN (H): ICD-10-CM

## 2018-08-13 LAB — HBA1C MFR BLD: 9.9 % (ref 4.1–5.7)

## 2018-08-13 RX ORDER — LISINOPRIL 10 MG/1
20 TABLET ORAL DAILY
Qty: 90 TABLET | Refills: 0 | Status: SHIPPED | OUTPATIENT
Start: 2018-08-13 | End: 2018-11-05

## 2018-08-13 NOTE — PATIENT INSTRUCTIONS
Increase Lantus insulin to 28 units at bedtime    Continue with Novolog insulin at 7 units before each meals.     Take 20 mg of Lisinopril once daily. (take 2 of your 10 mg at home). New RX [facility] for #90.     Call in your blood sugar readings every other Monday to the clinic.     Follow up in 2-4 weeks. Will call you with date and time. If you don't hear back in about 2 weeks, call the clinic and ask to speak with Nelly.      ++++++++++++++++++++++++++++++++++++++++++++++++++++++++++++++++++++  Your medication list is printed, please keep this with you, it is helpful to bring this current list to any other medical appointments, the emergency room or hospital.    If you had lab testing today and your results are reassuring or normal they will be be mailed to you within 7 days.     If the lab tests need quick action we will call you with the results.   The phone number we will call with results is # [phone] (home) . If this is not the best number please call our clinic and change the number.    If you need any refills please call your pharmacy and they will contact us.    If you have any further concerns or wish to schedule another appointment you must call our office during normal business hours  [phone] (8-5:00 M-F)  If you have urgent medical questions that cannot wait  you may also call [phone] at any time of day.  If you have a medical emergency please call [phone].    Thank you for coming to [facility].

## 2018-08-13 NOTE — NURSING NOTE
Chief Complaint   Patient presents with     Diabetes     sugars f/u.      Medication Reconciliation     Refill Request     Would like a new meter as current one is not working.        /78  Pulse 68  Temp 98.5  F (36.9  C) (Oral)  Resp 20  Wt 212 lb (96.2 kg)  SpO2 98%  BMI 28.75 kg/m2

## 2018-08-13 NOTE — MR AVS SNAPSHOT
After Visit Summary   8/13/2018    Elie Martinez    MRN: [id number]           Patient Information     Date Of Birth          9/8/1932        Visit Information        Provider Department      8/13/2018 1:40 PM Jhon Gutierrez MD [facility]        Today's Diagnoses     Type 2 diabetes mellitus with hyperglycemia, without long-term current use of insulin (H)          Care Instructions      Increase Lantus insulin to 28 units at bedtime    Continue with Novolog insulin at 7 units before each meals.     Take 20 mg of Lisinopril once daily. (take 2 of your 10 mg at home). [facility].     Call in your blood sugar readings every other Monday to the clinic.     Follow up in 2-4 weeks. Will call you with date and time. If you don't hear back in about 2 weeks, call the clinic and ask to speak with Nelly.      ++++++++++++++++++++++++++++++++++++++++++++++++++++++++++++++++++++  Your medication list is printed, please keep this with you, it is helpful to bring this current list to any other medical appointments, the emergency room or hospital.    If you had lab testing today and your results are reassuring or normal they will be be mailed to you within 7 days.     If the lab tests need quick action we will call you with the results.   The phone number we will call with results is # [phone] (home) . If this is not the best number please call our clinic and change the number.    If you need any refills please call your pharmacy and they will contact us.    If you have any further concerns or wish to schedule another appointment you must call our office during normal business hours  [phone] (8-5:00 M-F)  If you have urgent medical questions that cannot wait  you may also call [phone] at any time of day.  If you have a medical emergency please call 482.    Thank you for coming to [facility].            Follow-ups after your visit        Who to contact      Please call your clinic at [phone] to:    Ask questions about your health    Make or cancel appointments    Discuss your medicines    Learn about your test results    Speak to your doctor            Additional Information About Your Visit        Care EveryWhere ID     This is your Care EveryWhere ID. This could be used by other organizations to access your [place] medical records  [id number]        Your Vitals Were     Pulse Temperature Respirations Pulse Oximetry BMI (Body Mass Index)       68 98.5  F (36.9  C) (Oral) 20 98% 28.75 kg/m2        Blood Pressure from Last 3 Encounters:   08/13/18 157/78   06/25/18 146/74   05/14/18 138/73    Weight from Last 3 Encounters:   08/13/18 212 lb (96.2 kg)   06/25/18 209 lb 12.8 oz (95.2 kg)   05/14/18 208 lb (94.3 kg)              We Performed the Following     Hemoglobin A1c ([facility])          Today's Medication Changes          These changes are accurate as of 8/13/18  2:49 PM.  If you have any questions, ask your nurse or doctor.               These medicines have changed or have updated prescriptions.        Dose/Directions    insulin glargine 100 UNIT/ML injection   Commonly known as:  LANTUS SOLOSTAR   This may have changed:  how much to take   Used for:  Type 2 diabetes mellitus with hyperglycemia, without long-term current use of insulin (H)   Changed by:  Jhon Gutierrez MD        Dose:  28 Units   Inject 28 Units Subcutaneous At Bedtime   Quantity:  15 mL   Refills:  0            Where to get your medicines      Some of these will need a paper prescription and others can be bought over the counter.  Ask your nurse if you have questions.     You don't need a prescription for these medications     insulin glargine 100 UNIT/ML injection                Primary Care Provider Office Phone # Fax #    Jhon Gutierrez [phone]       [address] [place] [address]        Equal Access to Services     TUAN MEDEIROS AH: Elizabeth ceballos  Christiano, jose antonioda luqadaha, qaybta kaaljacek bird, carlos eshain hayaan nicoljorden davidtori laleeannchris mary. So [facility] [phone].    ATENCIÓN: Si ludala amol, tiene a ponce disposición servicios gratuitos de asistencia lingüística. Gerson al [phone].    We comply with applicable federal civil rights laws and [place] laws. We do not discriminate on the basis of race, color, national origin, age, disability, sex, sexual orientation, or gender identity.            Thank you!     Thank you for choosing [facility]  for your care. Our goal is always to provide you with excellent care. Hearing back from our patients is one way we can continue to improve our services. Please take a few minutes to complete the written survey that you may receive in the mail after your visit with us. Thank you!             Your Updated Medication List - Protect others around you: Learn how to safely use, store and throw away your medicines at www.disposemymeds.org.          This list is accurate as of 8/13/18  2:49 PM.  Always use your most recent med list.                   Brand Name Dispense Instructions for use Diagnosis    aspirin 325 MG tablet     90 tablet    Take 1 tablet (325 mg) by mouth daily    Cerebral vascular disease       atorvastatin 40 MG tablet    LIPITOR    90 tablet    Take 1 tablet (40 mg) by mouth daily    Cerebral vascular disease       blood glucose lancets standard    no brand specified    300 each    Use to test blood sugar 3 times daily or as directed.    Type 2 diabetes mellitus with hyperglycemia, with long-term current use of insulin (H)       blood glucose monitoring meter device kit    no brand specified    1 kit    Use to test blood sugar 3 times daily or as directed.    Type 2 diabetes mellitus with hyperglycemia, with long-term current use of insulin (H)       * blood glucose monitoring test strip    no brand specified    300 each    Use to test blood sugars 3 times daily or as directed    Type 2  diabetes mellitus with hyperglycemia, with long-term current use of insulin (H)       * blood glucose monitoring test strip    FREESTYLE TEST STRIPS    200 strip    Use to test blood sugar 3 times daily or as directed.    Type 2 diabetes mellitus with hyperglycemia, with long-term current use of insulin (H)       ICAPS AREDS FORMULA Tabs      Take 1 tablet by mouth 2 times daily        insulin aspart 100 UNIT/ML injection    NovoLOG FLEXPEN    15 mL    7 units before breakfast, 7 units before lunch, 7 units before dinner    Type 2 diabetes mellitus with hyperglycemia, with long-term current use of insulin (H)       insulin glargine 100 UNIT/ML injection    LANTUS SOLOSTAR    15 mL    Inject 28 Units Subcutaneous At Bedtime    Type 2 diabetes mellitus with hyperglycemia, without long-term current use of insulin (H)       insulin pen needle 32G X 6 MM    NOVOFINE    100 each    Use once daily or as directed for Lantus Solostar.    Type 2 diabetes mellitus with stage 2 chronic kidney disease, with long-term current use of insulin (H)       lisinopril 10 MG tablet    PRINIVIL/ZESTRIL    90 tablet    Take 1 tablet (10 mg) by mouth daily    Benign essential hypertension       metFORMIN 500 MG 24 hr tablet    GLUCOPHAGE-XR     Take 1/2 tablet by mouth twice daily    Type 2 diabetes mellitus with hyperglycemia, without long-term current use of insulin (H)       ONETOUCH LANCETS Misc     100 each    Use to test blood sugars 3-4 times daily or as directed. (Please dispense LifeScan or preferred brand by insurance)    Type 2 diabetes mellitus without complication (H)       * Notice:  This list has 2 medication(s) that are the same as other medications prescribed for you. Read the directions carefully, and ask your doctor or other care provider to review them with you.

## 2018-08-14 NOTE — PROGRESS NOTES
Subjective: 85-year-old male with hypertension and type 2 diabetes.  We have been titrating his insulin with weekly glucose reporting via phone.  He is currently using Lantus 26 units a day and NovoLog 7 units with meals 3 times daily.  His meter stopped working 4 days ago  Fasting glucoses over the previous 10 days have ranged from 168-209.  Postprandial glucoses from .  He notices the morning glucoses are higher after an evening of ice cream or popcorn.  As far as his blood pressure no dizziness lightheadedness or orthostatic symptoms.  He takes lisinopril 10 mg each day.  He does not monitor his blood pressure outside of the clinic.    Review of systems: No recent illness.  No respiratory symptoms.  No chest pain episodes.  No significant change in vision.  No skin changes.  No paresthesias.    Exam: Vitals with blood pressure above goal.  Blood pressure is 146/74 in June.  138/73 in May.  General: He is alert and relates his own history and describes his medication doses accurately  HEENT: Head is nor cephalic atraumatic.  Moist moist membranes  Cardiovascular: Heart tones are quiet and regular  Respiratory: Lungs are clear with fair air exchange bilaterally  Abdomen, soft and nontender  Extremities: Free of edema    Assessment and plan  1) type 2 diabetes: A1c today 9.9% improved from 11.7% 3 months ago.  3 months we have been titrating insulin over the past  Postprandial glucoses are now at goal, fastings above goal.  Increase Lantus insulin to 28 units each day.  Continue NovoLog 7 units with each meal.  He will call with glucose readings every other week continue to titrate via phone.  Follow-up in clinic in 2-4 weeks  Repeat A1c November 2018  Continue daily statin statin.  Continue daily aspirin.  Continue yearly eye exams.  2) hypertension: Blood pressure above goal over the past 2 visits.  Increase lisinopril from 10-20 mg each day.  Recent GFR was normal.  Return to clinic in 2-4 weeks for repeat  basic metabolic profile including a potassium and GFR.

## 2018-08-17 ENCOUNTER — TELEPHONE (OUTPATIENT)
Dept: FAMILY MEDICINE | Facility: CLINIC | Age: 83
End: 2018-08-17

## 2018-08-17 NOTE — TELEPHONE ENCOUNTER
[facility] Medicine phone call message- medication clarification/question:    Full Medication Name: LISINOPRIL   Dose: 10 MG    Question: Pharmacy called to verify a prescription for pt. The pharmacy needs clarification on the directions of the medication listed above, as well as clarification on the quantity of the medication listed above. Please call and advise.      Pharmacy confirmed as      [facility]  FOR [place], [place] - [address] [place] [address]  : Yes    OK to leave a message on voice mail? Yes    Primary language: English      needed? No    Call taken on August 17, 2018 at 9:46 AM by Nelia Kenney

## 2018-08-17 NOTE — TELEPHONE ENCOUNTER
Called and spoke to Iram the Pharmacist at [facility]. Confirmed Rx signature, Iram stated it would be more cost efficient to patient if they just made it a 20mg tablet for 45 days. I stated that was fine and to note patient need appt for follow up of BP prior to next refill. Patient does have appt on 9/6/18 f/u BP

## 2018-08-21 ENCOUNTER — TELEPHONE (OUTPATIENT)
Dept: FAMILY MEDICINE | Facility: CLINIC | Age: 83
End: 2018-08-21

## 2018-08-21 DIAGNOSIS — E11.22 TYPE 2 DIABETES MELLITUS WITH STAGE 2 CHRONIC KIDNEY DISEASE, WITH LONG-TERM CURRENT USE OF INSULIN (H): ICD-10-CM

## 2018-08-21 DIAGNOSIS — Z79.4 TYPE 2 DIABETES MELLITUS WITH STAGE 2 CHRONIC KIDNEY DISEASE, WITH LONG-TERM CURRENT USE OF INSULIN (H): ICD-10-CM

## 2018-08-21 DIAGNOSIS — N18.2 TYPE 2 DIABETES MELLITUS WITH STAGE 2 CHRONIC KIDNEY DISEASE, WITH LONG-TERM CURRENT USE OF INSULIN (H): ICD-10-CM

## 2018-08-21 NOTE — TELEPHONE ENCOUNTER
[facility] phone call message- medication clarification/question:    Full Medication Name: Insulin needles/Novofine    Dose: 32g     Question: [facility] won't send these out cause they have it down that he is using it 1x/day but patient states that he uses it 4x/day which would need it changed to 400 and not 100 for dispense.     Wondering if you can send 5 days (Qty of 25 to be safe) and send this one to [place] [address]     If you have any questions he will be home between 3:30 & 5:30 today. States that he has had to get some from his daughter since he has been out for awhile now.     Pharmacy confirmed as    [id number] - [place], [place] - [facility] [address] AT [facility] [address]  [facility], [place] - [address] [facility] [place]  [id number] - [place], [place] - [address] [place] [address] E AT [facility] & [facility]: No    OK to leave a message on voice mail? Yes    Primary language: English      needed? No    Call taken on August 21, 2018 at 1:04 PM by Cherie Linares

## 2018-08-21 NOTE — TELEPHONE ENCOUNTER
New Rx for [facility]    New Rx for 25 needles to [facility]    Use insulin needles 4 times daily (meal insulin x3 and lantus insulin x1 per day)    Patient notified by phone.    DAISHA Gutierrez

## 2018-09-04 DIAGNOSIS — E11.65 TYPE 2 DIABETES MELLITUS WITH HYPERGLYCEMIA, WITHOUT LONG-TERM CURRENT USE OF INSULIN (H): ICD-10-CM

## 2018-09-04 NOTE — TELEPHONE ENCOUNTER
[facility] phone call message- medication clarification/question:    Full Medication Name: insulin glargine (LANTUS SOLOSTAR) 100 UNIT/ML pen    Question: Pt requesting refill on medications above. States will be running out soon.     Pharmacy confirmed as    [facility]  FOR [place], [place] - [address] [place] [address]: Yes    OK to leave a message on voice mail? Yes    Primary language: English      needed? No    Call taken on September 4, 2018 at 10:39 AM by Luz Marina Stover

## 2018-09-06 ENCOUNTER — OFFICE VISIT (OUTPATIENT)
Dept: FAMILY MEDICINE | Facility: CLINIC | Age: 83
End: 2018-09-06
Payer: MEDICARE

## 2018-09-06 VITALS
HEIGHT: 72 IN | RESPIRATION RATE: 18 BRPM | DIASTOLIC BLOOD PRESSURE: 73 MMHG | TEMPERATURE: 97.7 F | HEART RATE: 79 BPM | SYSTOLIC BLOOD PRESSURE: 169 MMHG | BODY MASS INDEX: 28.71 KG/M2 | OXYGEN SATURATION: 97 % | WEIGHT: 212 LBS

## 2018-09-06 DIAGNOSIS — N18.1 CKD (CHRONIC KIDNEY DISEASE) STAGE 1, GFR 90 ML/MIN OR GREATER: ICD-10-CM

## 2018-09-06 DIAGNOSIS — N18.2 TYPE 2 DIABETES MELLITUS WITH STAGE 2 CHRONIC KIDNEY DISEASE, WITH LONG-TERM CURRENT USE OF INSULIN (H): Primary | ICD-10-CM

## 2018-09-06 DIAGNOSIS — Z23 IMMUNIZATION DUE: ICD-10-CM

## 2018-09-06 DIAGNOSIS — I10 ESSENTIAL HYPERTENSION: ICD-10-CM

## 2018-09-06 DIAGNOSIS — E11.22 TYPE 2 DIABETES MELLITUS WITH STAGE 2 CHRONIC KIDNEY DISEASE, WITH LONG-TERM CURRENT USE OF INSULIN (H): Primary | ICD-10-CM

## 2018-09-06 DIAGNOSIS — Z79.4 TYPE 2 DIABETES MELLITUS WITH STAGE 2 CHRONIC KIDNEY DISEASE, WITH LONG-TERM CURRENT USE OF INSULIN (H): Primary | ICD-10-CM

## 2018-09-06 NOTE — PATIENT INSTRUCTIONS
We want you morning blood sugars to be in control the rest of the day should be easier then.  Start taking 5 units of novalog in the morning   If your blood sugars are around 130 for two days in a row, then increase your lantus that night by 2 more units   We may decrease the lunch time novalog to 5 units  Take 32 units of lantus now for the evening   Note: If youre BS are less than 100, please call us and let us know to make adjustments     Follow up in 4 weeks     ~~~~~~~~~~~~~~~~~~~~~~~~~~~~~~~~~~    Your medication list is printed, please keep this with you, it is helpful to bring this current list to any other medical appointments, the emergency room or hospital.    If you had lab testing today and your results are reassuring or normal they will be be mailed to you within 7 days.     If the lab tests need quick action we will call you with the results.   The phone number we will call with results is # [phone] (home) . If this is not the best number please call our clinic and change the number.    If you need any refills please call your pharmacy and they will contact us.    If you have any further concerns or wish to schedule another appointment you must call our office during normal business hours  [phone] (8-5:00 M-F)  If you have urgent medical questions that cannot wait  you may also call [phone] at any time of day.  If you have a medical emergency please call [phone].    Thank you for coming to [facility].

## 2018-09-06 NOTE — NURSING NOTE
Patient stated he has a living will at home that has an advanced directive in it. Reminded patient to bring in next time and we can make a copy for chart.

## 2018-09-06 NOTE — MR AVS SNAPSHOT
After Visit Summary   9/6/2018    Elie Martinez    MRN: [id number]           Patient Information     Date Of Birth          9/8/1932        Visit Information        Provider Department      9/6/2018 9:00 AM Jairo Hammer MD [facility]        Care Instructions    We want you morning blood sugars to be in control the rest of the day should be easier then.  Start taking 5 units of novalog in the morning   If your blood sugars are around 130 for two days in a row, then increase your lantus that night by 2 more units   We may decrease the lunch time novalog to 5 units  Take 32 units of lantus now for the evening   Note: If youre BS are less than 100, please call us and let us know to make adjustments     Follow up in 4 weeks     ~~~~~~~~~~~~~~~~~~~~~~~~~~~~~~~~~~    Your medication list is printed, please keep this with you, it is helpful to bring this current list to any other medical appointments, the emergency room or hospital.    If you had lab testing today and your results are reassuring or normal they will be be mailed to you within 7 days.     If the lab tests need quick action we will call you with the results.   The phone number we will call with results is # [phone] (home) . If this is not the best number please call our clinic and change the number.    If you need any refills please call your pharmacy and they will contact us.    If you have any further concerns or wish to schedule another appointment you must call our office during normal business hours  [phone] (8-5:00 M-F)  If you have urgent medical questions that cannot wait  you may also call [phone] at any time of day.  If you have a medical emergency please call [phone].    Thank you for coming to [facility].            Follow-ups after your visit        Who to contact     Please call your clinic at [phone] to:    Ask questions about your health    Make or cancel appointments    Discuss your  "medicines    Learn about your test results    Speak to your doctor            Additional Information About Your Visit        Care EveryWhere ID     This is your Care EveryWhere ID. This could be used by other organizations to access your [place] medical records  [id number]        Your Vitals Were     Pulse Temperature Respirations Height Pulse Oximetry BMI (Body Mass Index)    79 97.7  F (36.5  C) (Oral) 18 6' 0.05\" (183 cm) 97% 28.71 kg/m2       Blood Pressure from Last 3 Encounters:   09/06/18 169/73   08/13/18 157/78   06/25/18 146/74    Weight from Last 3 Encounters:   09/06/18 212 lb (96.2 kg)   08/13/18 212 lb (96.2 kg)   06/25/18 209 lb 12.8 oz (95.2 kg)              Today, you had the following     No orders found for display       Primary Care Provider Office Phone # Fax #    Jhon Gutierrez [phone]       [address] [place] [address]        Equal Access to Services     [facility]: Hadii aad ku hadasho Soomaali, waaxda luqadaha, qaybta kaalmada adeegyada, waxay idiin hayjacn jade hernandezaramisty denise . So [facility] [phone].    ATENCIÓN: Si habla español, tiene a ponce disposición servicios gratuitos de asistencia lingüística. [facility] [phone].    We comply with applicable federal civil rights laws and [place] laws. We do not discriminate on the basis of race, color, national origin, age, disability, sex, sexual orientation, or gender identity.            Thank you!     Thank you for choosing [facility]  for your care. Our goal is always to provide you with excellent care. Hearing back from our patients is one way we can continue to improve our services. Please take a few minutes to complete the written survey that you may receive in the mail after your visit with us. Thank you!             Your Updated Medication List - Protect others around you: Learn how to safely use, store and throw away your medicines at www.disposemymeds.org.          This list is accurate " as of 9/6/18  9:40 AM.  Always use your most recent med list.                   Brand Name Dispense Instructions for use Diagnosis    aspirin 325 MG tablet     90 tablet    Take 1 tablet (325 mg) by mouth daily    Cerebral vascular disease       atorvastatin 40 MG tablet    LIPITOR    90 tablet    Take 1 tablet (40 mg) by mouth daily    Cerebral vascular disease       blood glucose lancets standard    no brand specified    300 each    Use to test blood sugar 3 times daily or as directed.    Type 2 diabetes mellitus with hyperglycemia, with long-term current use of insulin (H)       blood glucose monitoring meter device kit    no brand specified    1 kit    Use to test blood sugar 3 times daily or as directed.    Type 2 diabetes mellitus with hyperglycemia, with long-term current use of insulin (H)       * blood glucose monitoring test strip    no brand specified    300 each    Use to test blood sugars 3 times daily or as directed    Type 2 diabetes mellitus with hyperglycemia, with long-term current use of insulin (H)       * blood glucose monitoring test strip    FREESTYLE TEST STRIPS    200 strip    Use to test blood sugar 3 times daily or as directed.    Type 2 diabetes mellitus with hyperglycemia, with long-term current use of insulin (H)       ICAPS AREDS FORMULA Tabs      Take 1 tablet by mouth 2 times daily        insulin aspart 100 UNIT/ML injection    NovoLOG FLEXPEN    15 mL    7 units before breakfast, 7 units before lunch, 7 units before dinner    Type 2 diabetes mellitus with hyperglycemia, with long-term current use of insulin (H)       insulin glargine 100 UNIT/ML injection    LANTUS SOLOSTAR    15 mL    Inject 30 Units Subcutaneous At Bedtime    Type 2 diabetes mellitus with hyperglycemia, without long-term current use of insulin (H)       insulin pen needle 32G X 6 MM    NOVOFINE    25 each    Use with insulin pen four times daily or as directed    Type 2 diabetes mellitus with stage 2 chronic  kidney disease, with long-term current use of insulin (H)       lisinopril 10 MG tablet    PRINIVIL/ZESTRIL    90 tablet    Take 2 tablets (20 mg) by mouth daily    Benign essential hypertension       metFORMIN 500 MG 24 hr tablet    GLUCOPHAGE-XR     Take 1/2 tablet by mouth twice daily    Type 2 diabetes mellitus with hyperglycemia, without long-term current use of insulin (H)       ONETOUCH LANCETS Misc     100 each    Use to test blood sugars 3-4 times daily or as directed. (Please dispense LifeScan or preferred brand by insurance)    Type 2 diabetes mellitus without complication (H)       * Notice:  This list has 2 medication(s) that are the same as other medications prescribed for you. Read the directions carefully, and ask your doctor or other care provider to review them with you.

## 2018-09-06 NOTE — PROGRESS NOTES
HPI:       Elie Martinez is a 85 year old  male who presents for recheck of diabetes.   Appears to have been doing well, but his A`1c values have been higher than desired for more than six months. Has been taking 28 units of Lantus, and 7 units short acting three times per day.  Values are fairly stable all days long, but average about 150.  No lows, but does have a value of 117 in the afternoon as the lowest. Lowest values appear to be 2 hours after afternoon lunch.      He states that Dr. Gutierrez asked him to increase to 30 units yesterday, and he intends to do that today. Takes his dose in the evening.       Appears not to be taking 500 me metformin ER, but instead is taking 0.5 of a 1000 mg regular metfromin tablet twice a day. Unclear why this is not 1000 mg bid dosing.      States that he takes his Novolog regularly, and always takes 7 units with each meal.    Did not take his blood pressure medicine this AM..              PMHX:   Current Medications:   Current Outpatient Prescriptions   Medication Sig Dispense Refill     atorvastatin (LIPITOR) 40 MG tablet Take 1 tablet (40 mg) by mouth daily 90 tablet 3     blood glucose (NO BRAND SPECIFIED) lancets standard Use to test blood sugar 3 times daily or as directed. 300 each 3     blood glucose monitoring (FREESTYLE TEST STRIPS) test strip Use to test blood sugar 3 times daily or as directed. 200 strip 3     blood glucose monitoring (NO BRAND SPECIFIED) meter device kit Use to test blood sugar 3 times daily or as directed. 1 kit 0     blood glucose monitoring (NO BRAND SPECIFIED) test strip Use to test blood sugars 3 times daily or as directed 300 each 3     insulin aspart (NOVOLOG FLEXPEN) 100 UNIT/ML injection 7 units before breakfast, 7 units before lunch, 7 units before dinner 15 mL 1     insulin glargine (LANTUS SOLOSTAR) 100 UNIT/ML pen Inject 30 Units Subcutaneous At Bedtime 15 mL 1     insulin pen needle (NOVOFINE) 32G X 6 MM Use with insulin pen  "four times daily or as directed 25 each 0     lisinopril (PRINIVIL/ZESTRIL) 10 MG tablet Take 2 tablets (20 mg) by mouth daily 90 tablet 0     metFORMIN (GLUCOPHAGE-XR) 500 MG 24 hr tablet Take 1/2 tablet by mouth twice daily       Multiple Vitamins-Minerals (ICAPS AREDS FORMULA) TABS Take 1 tablet by mouth 2 times daily       ONE TOUCH LANCETS MISC Use to test blood sugars 3-4 times daily or as directed. (Please dispense LifeScan or preferred brand by insurance) 100 each 11     aspirin 325 MG tablet Take 1 tablet (325 mg) by mouth daily 90 tablet 3       Existing Problems  Patient Active Problem List   Diagnosis     Long-term insulin use in type 2 diabetes (H)     Cerebral vascular disease     S/P carotid endarterectomy     Hypertension     Prostate cancer (H)     Hyperlipidemia LDL goal <100     Chronic renal disease, stage II     Type 2 diabetes mellitus with hyperglycemia, with long-term current use of insulin (H)       Allergies:  No Known Allergies    Previous labs:  Lab Results   Component Value Date    HGB 13.4 01/05/2017    HCT 42.6 01/05/2017    CHOL 122 01/05/2017    TRIG 181.0 (H) 12/21/2015    HDL 33 (L) 01/05/2017    .0 05/14/2018    BUN 23.0 05/14/2018    CO2 26.0 05/14/2018    PSA 0.8 09/14/2016    PSA 0.8 09/14/2016               Review of Systems:    CONSTITUTIONAL: no fatigue, no unexpected change in weight  SKIN: no worrisome rashes, no worrisome moles, no worrisome lesions  EYES: no acute vision problems or changes  ENT: no ear problems, no mouth problems, no throat problems  RESP: no significant cough, no shortness of breath  CV: no chest pain, no palpitations, no new or worsening peripheral edema  GI: no nausea, no vomiting, no constipation, no diarrhea          Physical Exam:     Vitals:    09/06/18 0852   BP: 169/73   Pulse: 79   Resp: 18   Temp: 97.7  F (36.5  C)   TempSrc: Oral   SpO2: 97%   Weight: 212 lb (96.2 kg)   Height: 6' 0.05\" (183 cm)     Body mass index is 28.71 " kg/(m^2).    GENERAL:alert, well hydrated, no distress  EYES: Eyes grossly normal to inspection, extraocular movements - intact, and PERRL  HENT: ear canals- normal; TMs- normal; Nose- normal; Mouth- no ulcers, no lesions  NECK: no tenderness, no adenopathy, no asymmetry, no masses, no stiffness; thyroid- normal to palpation  RESP: lungs clear to auscultation - no rales, no rhonchi, no wheezes  CV: regular rates and rhythm, normal S1 S2, no S3 or S4 and no murmur, no click or rub -  FEET: Sensory: Exam intact to light touch bilaterally. Intact to 5.07 monofilament bilaterally on the dorsum of the hallux proximal to the IP joint. Intact to 220 Hz vibratory sense with vibratory threshold greater than 8 seconds bilaterally at hallux IP joint.  Vascular: DP and PT pulses 4+/4+ bilaterally.   Intact capillary refill.    Motor: FROM, Ankle jerk intact and symmetrical bilaterally.      Skin: Dry, intact. No erythema.  No heavy callous or ulcers on visual inspection bilaterally.                 Labs and Procedures     Office Visit on 08/13/2018   Component Date Value Ref Range Status     Hemoglobin A1C 08/13/2018 9.9* 4.1 - 5.7 % Final              Assessment and Plan     1.increase lantus to 32, decrease am and afternoon novolog to 5 units. Treat to target instructions, increase by 2 every other day if am glucose greater than 130.   2. Discuss with Dr Gutierrez whether to increase to 1000 mg of metformin bid. Unclear why only on 500 mg metformin bid.   3. Check A1c, microalbumin/cr ratio.   4. With little postprandial change, would consider increasing Lantus to coirrect fasting levels first, and seeing if the Novolog could be reduced or eliminated from 1 or 2 meals. Patient may be a good candidate for TZD, or SGLT2, depending on insurance.       Options for treatment and follow-up care were reviewed with the patient and/or guardian. Elie Martinez and/or guardian engaged in the decision making process and verbalized  understanding of the options discussed and agreed with the final plan.    Jairo Hammer MD

## 2018-09-17 ENCOUNTER — TELEPHONE (OUTPATIENT)
Dept: FAMILY MEDICINE | Facility: CLINIC | Age: 83
End: 2018-09-17

## 2018-09-17 DIAGNOSIS — E11.65 TYPE 2 DIABETES MELLITUS WITH HYPERGLYCEMIA, WITHOUT LONG-TERM CURRENT USE OF INSULIN (H): ICD-10-CM

## 2018-09-17 DIAGNOSIS — E11.65 TYPE 2 DIABETES MELLITUS WITH HYPERGLYCEMIA, WITH LONG-TERM CURRENT USE OF INSULIN (H): ICD-10-CM

## 2018-09-17 DIAGNOSIS — Z79.4 TYPE 2 DIABETES MELLITUS WITH HYPERGLYCEMIA, WITH LONG-TERM CURRENT USE OF INSULIN (H): ICD-10-CM

## 2018-09-17 NOTE — TELEPHONE ENCOUNTER
[facility] phone call message- general phone call:    Reason for call: Blood sugars readings  5 units novolog 3x/day  32 units lantus  34 units lantus starting on the 13th of September    9/10  101 in am   71 in afternoon     189 in evening  9/11  169 in am   141 in afternoon   178 in evening  9/12  161 in am   166 in afternoon   148 in evening  9/13  144 in am   161 in afternoon   176 in evening  9/14  159 in am   210 in afternoon   178 in evening    9/15  142 in am   159 in afternoon   169 in evening  9/16  138 in am   170 in afternoon   177 in evening  9/17  161 in am     Return call needed: No    OK to leave a message on voice mail? Yes    Primary language: English      needed? No    Call taken on September 17, 2018 at 11:14 AM by Cherie Linares

## 2018-09-18 NOTE — TELEPHONE ENCOUNTER
Please call patient with Recommendations:    Increase Lantus insulin dose to 36 units daily.    Reduce Novolog insulin to 5 units sub-cut only TWICE daily with your two largest meals of the day (instead of 3 times daily)    Call with blood glucose readings in one week for further insulin titration.    Keep appointment with Dr. Hammer scheduled for 10/4/18.  Take LISINOPRIL 20mg daily for blood pressure.  He should get a basic metabolic profile (kidney blood test) on 10/4/18 with Dr. Hammer due to the change in his blood pressure medication dose on 08/13/18.    Jhon Gutierrez MD

## 2018-09-19 NOTE — TELEPHONE ENCOUNTER
Elie informed of detailed message from Dr Gutierrez. He will call in one week with bs readings. Denis PARNELL

## 2018-09-24 ENCOUNTER — TELEPHONE (OUTPATIENT)
Dept: FAMILY MEDICINE | Facility: CLINIC | Age: 83
End: 2018-09-24

## 2018-09-24 NOTE — TELEPHONE ENCOUNTER
5 UNITS NOVOLOG AND 34 UNITS LANTUS    9/16 138 MORNING, 170 NOON, 177 EVENING   9/17 161 MORNING, 187 NOON, 169 EVENING  9/18 181 MORNING, 189 NOON, 160 EVENING    9/19 - INCREASED LANTUS TO 36 UNITS   141 MORNING, 134 NOON, 178 EVENING    9/20 158 MORNING, 180 NOON, 190 EVENING  9/21 141 MORNING, 111 NOON, 190 EVENING  9/22 112 MORNING, 83 NOON, 198 EVENING   9/23 151 MORNING, 153 NOON, 187 EVENING  9/24 171 THIS MORNING

## 2018-09-25 NOTE — TELEPHONE ENCOUNTER
Glucoses reviewed.  Post-prandials at goal; fasting glucoses still above goal of less than 150.  Lowest reading 83 at noon 9/22 (patient cannot recall what he ate/activity that day).    I called and spoke with patient. He repots taking Lantus insulin 36 units daily and Novolog insulin 5 units 3 times daily with meals (despite recommendation last week to reduce Novolog to twice daily).    He confirms taking lisinopril at 20mg daily (dose increased from 10 to 20mg in mid August 2018).     I recommend he hold his insulin dose the same for now to ensure correct medication admin and adherence at office visit coming up 10/4 with Dr. Jairo Hammer.  BMP to be obtained 10/4 due to lisinopril dose change last month.    I will send an epic message to Dr. Hammer with this patient update.    DAISHA Gutierrez MD

## 2018-10-01 ENCOUNTER — TELEPHONE (OUTPATIENT)
Dept: FAMILY MEDICINE | Facility: CLINIC | Age: 83
End: 2018-10-01

## 2018-10-01 NOTE — TELEPHONE ENCOUNTER
[facility] phone call message- general phone call:    Reason for call: Blood Sugars   36 units Lantus & 5 units novalog 3x/Daily  Times given- 7-8 in AM  2-3 in PM   8-9 in noc (night)    9/24  176 in am   121 in PM     165 in noc  9/25  158 in am   148 in PM     179 in noc  9/26 143 in am    134 in PM     181 in noc  9/27  154 in am   181 in PM     171 in noc  9/28  159 in am   179 in PM     181 in noc  9/29  149 in am   182 in PM     178 in noc  9/30  159 in am   181 in PM     171 in noc  10/1 158 in am    Return call needed: No    OK to leave a message on voice mail? Yes    Primary language: English      needed? No    Call taken on October 1, 2018 at 9:27 AM by Cherie Linares

## 2018-10-01 NOTE — TELEPHONE ENCOUNTER
Glucoses noted.    Continue current insulin doses: Lantus   36 units once daily; Novolog 5 units 3 times daily with meals.    Clinic visit this week 10/4 with Dr Hammer for medication administration technique review and adherence evaluation.

## 2018-10-04 ENCOUNTER — OFFICE VISIT (OUTPATIENT)
Dept: FAMILY MEDICINE | Facility: CLINIC | Age: 83
End: 2018-10-04
Payer: MEDICARE

## 2018-10-04 VITALS
HEART RATE: 79 BPM | SYSTOLIC BLOOD PRESSURE: 157 MMHG | OXYGEN SATURATION: 96 % | RESPIRATION RATE: 16 BRPM | DIASTOLIC BLOOD PRESSURE: 71 MMHG | WEIGHT: 214 LBS | BODY MASS INDEX: 28.99 KG/M2 | HEIGHT: 72 IN | TEMPERATURE: 97.7 F

## 2018-10-04 DIAGNOSIS — I10 ESSENTIAL HYPERTENSION: ICD-10-CM

## 2018-10-04 DIAGNOSIS — Z79.4 TYPE 2 DIABETES MELLITUS WITH MICROALBUMINURIA, WITH LONG-TERM CURRENT USE OF INSULIN (H): Primary | ICD-10-CM

## 2018-10-04 DIAGNOSIS — R80.9 TYPE 2 DIABETES MELLITUS WITH MICROALBUMINURIA, WITH LONG-TERM CURRENT USE OF INSULIN (H): Primary | ICD-10-CM

## 2018-10-04 DIAGNOSIS — R19.5 LOOSE STOOLS: ICD-10-CM

## 2018-10-04 DIAGNOSIS — E11.29 TYPE 2 DIABETES MELLITUS WITH MICROALBUMINURIA, WITH LONG-TERM CURRENT USE OF INSULIN (H): Primary | ICD-10-CM

## 2018-10-04 DIAGNOSIS — N18.1 CKD (CHRONIC KIDNEY DISEASE) STAGE 1, GFR 90 ML/MIN OR GREATER: ICD-10-CM

## 2018-10-04 LAB — HBA1C MFR BLD: 8.5 % (ref 4.1–5.7)

## 2018-10-04 NOTE — PROGRESS NOTES
HPI:       Elie Martinez is a 86 year old  male who presents for evaluation of uncontrolled diabetes.   Has been increasing lantus, now taking 36 untis per day.    Taking metfromin ER 1000 mg, 1/2 tab in AM and 1/2 tab in PM.  Discussed with Dr. Gutierrez, who said this was due to difficulty with constipation/diarrhea and GI upset.   SMBG:  AM   7 143-176  158  No hypoglycemia.                         PMHX:   Current Medications:   Current Outpatient Prescriptions   Medication Sig Dispense Refill     aspirin 325 MG tablet Take 1 tablet (325 mg) by mouth daily 90 tablet 3     atorvastatin (LIPITOR) 40 MG tablet Take 1 tablet (40 mg) by mouth daily 90 tablet 3     blood glucose (NO BRAND SPECIFIED) lancets standard Use to test blood sugar 3 times daily or as directed. 300 each 3     blood glucose monitoring (FREESTYLE TEST STRIPS) test strip Use to test blood sugar 3 times daily or as directed. 200 strip 3     blood glucose monitoring (NO BRAND SPECIFIED) meter device kit Use to test blood sugar 3 times daily or as directed. 1 kit 0     blood glucose monitoring (NO BRAND SPECIFIED) test strip Use to test blood sugars 3 times daily or as directed 300 each 3     insulin aspart (NOVOLOG FLEXPEN) 100 UNIT/ML injection 5 units sub-cut twice daily with meals 15 mL 1     insulin glargine (LANTUS SOLOSTAR) 100 UNIT/ML pen Inject 36 Units Subcutaneous At Bedtime 15 mL 1     insulin pen needle (NOVOFINE) 32G X 6 MM Use with insulin pen four times daily or as directed 25 each 0     lisinopril (PRINIVIL/ZESTRIL) 10 MG tablet Take 2 tablets (20 mg) by mouth daily 90 tablet 0     metFORMIN (GLUCOPHAGE) 500 MG tablet Take 1 tablet (500 mg) by mouth 2 times daily (with meals) 180 tablet 1     Multiple Vitamins-Minerals (ICAPS AREDS FORMULA) TABS Take 1 tablet by mouth 2 times daily       ONE TOUCH LANCETS MISC Use to test blood sugars 3-4 times daily or as directed. (Please dispense LifeScan or preferred brand by insurance) 100  "each 11       Existing Problems  Patient Active Problem List   Diagnosis     Long-term insulin use in type 2 diabetes (H)     Cerebral vascular disease     S/P carotid endarterectomy     Hypertension     Prostate cancer (H)     Hyperlipidemia LDL goal <100     Chronic renal disease, stage II     Type 2 diabetes mellitus with hyperglycemia, with long-term current use of insulin (H)       Allergies:  No Known Allergies    Previous labs:  Lab Results   Component Value Date    HGB 13.4 01/05/2017    HCT 42.6 01/05/2017    CHOL 122 01/05/2017    TRIG 181.0 (H) 12/21/2015    HDL 33 (L) 01/05/2017    .0 05/14/2018    BUN 23.0 05/14/2018    CO2 26.0 05/14/2018    PSA 0.8 09/14/2016    PSA 0.8 09/14/2016               Review of Systems:    CONSTITUTIONAL: no fatigue, no unexpected change in weight  SKIN: no worrisome rashes, no worrisome moles, no worrisome lesions  EYES: no acute vision problems or changes  ENT: no ear problems, no mouth problems, no throat problems  RESP: no significant cough, no shortness of breath  CV: no chest pain, no palpitations, no new or worsening peripheral edema  GI: no nausea, no vomiting, no constipation, no diarrhea          Physical Exam:     Vitals:    10/04/18 0906   BP: 157/71   Pulse: 79   Resp: 16   Temp: 97.7  F (36.5  C)   TempSrc: Oral   SpO2: 96%   Weight: 214 lb (97.1 kg)   Height: 6' 0.05\" (183 cm)     Body mass index is 28.99 kg/(m^2).    GENERAL:alert, well hydrated, no distress  EYES: Eyes grossly normal to inspection, extraocular movements - intact, and PERRL  HENT: ear canals- normal; TMs- normal; Nose- normal; Mouth- no ulcers, no lesions  NECK: no tenderness, no adenopathy, no asymmetry, no masses, no stiffness; thyroid- normal to palpation  RESP: lungs clear to auscultation - no rales, no rhonchi, no wheezes  CV: regular rates and rhythm, normal S1 S2, no S3 or S4 and no murmur, no click or rub -           Labs and Procedures     Office Visit on 08/13/2018   Component " Date Value Ref Range Status     Hemoglobin A1C 08/13/2018 9.9* 4.1 - 5.7 % Final              Assessment and Plan     1.Increase Lantus to 38, then to 40.  2.  Increase metformin to whole table in the AM, 1/2 at night.   3. Will Check routine monitoring bloods  4. Return in 1 month         Options for treatment and follow-up care were reviewed with the patient and/or guardian. Elie Martinez and/or guardian engaged in the decision making process and verbalized understanding of the options discussed and agreed with the final plan.    Jairo Hammer MD

## 2018-10-04 NOTE — MR AVS SNAPSHOT
"              After Visit Summary   10/4/2018    Elie Martinez    MRN: [id number]           Patient Information     Date Of Birth          9/8/1932        Visit Information        Provider Department      10/4/2018 9:00 AM Jairo Hammer MD [facility]        Today's Diagnoses     Type 2 diabetes mellitus with microalbuminuria, with long-term current use of insulin (H)    -  1       Follow-ups after your visit        Who to contact     Please call your clinic at [phone] to:    Ask questions about your health    Make or cancel appointments    Discuss your medicines    Learn about your test results    Speak to your doctor            Additional Information About Your Visit        Care EveryWhere ID     This is your Care EveryWhere ID. This could be used by other organizations to access your [facility] medical records  [id number]        Your Vitals Were     Pulse Temperature Respirations Height Pulse Oximetry BMI (Body Mass Index)    79 97.7  F (36.5  C) (Oral) 16 6' 0.05\" (183 cm) 96% 28.99 kg/m2       Blood Pressure from Last 3 Encounters:   10/04/18 157/71   09/06/18 169/73   08/13/18 157/78    Weight from Last 3 Encounters:   10/04/18 214 lb (97.1 kg)   09/06/18 212 lb (96.2 kg)   08/13/18 212 lb (96.2 kg)              We Performed the Following     Hemoglobin A1c ([facility])        Primary Care Provider Office Phone # Fax #    Jhon Gutierrez [phone]       [address] [place], [place] [id number]        Equal Access to Services     TUAN MEDEIROS AH: Hadii tristian ku hadasho Soomaali, waaxda luqadaha, qaybta kaalmada adeegyada, waxtawanda mikaela denise . So [facility] [phone].    ATENCIÓN: Si habla amol, tiene a ponce disposición servicios gratuitos de asistencia lingüística. Llame al [phone].    We comply with applicable federal civil rights laws and [place] laws. We do not discriminate on the basis of race, color, national origin, age, disability, sex, sexual " orientation, or gender identity.            Thank you!     Thank you for choosing [facility]  for your care. Our goal is always to provide you with excellent care. Hearing back from our patients is one way we can continue to improve our services. Please take a few minutes to complete the written survey that you may receive in the mail after your visit with us. Thank you!             Your Updated Medication List - Protect others around you: Learn how to safely use, store and throw away your medicines at www.disposemymeds.org.          This list is accurate as of 10/4/18 10:03 AM.  Always use your most recent med list.                   Brand Name Dispense Instructions for use Diagnosis    aspirin 325 MG tablet     90 tablet    Take 1 tablet (325 mg) by mouth daily    Cerebral vascular disease       atorvastatin 40 MG tablet    LIPITOR    90 tablet    Take 1 tablet (40 mg) by mouth daily    Cerebral vascular disease       blood glucose lancets standard    no brand specified    300 each    Use to test blood sugar 3 times daily or as directed.    Type 2 diabetes mellitus with hyperglycemia, with long-term current use of insulin (H)       blood glucose monitoring meter device kit    no brand specified    1 kit    Use to test blood sugar 3 times daily or as directed.    Type 2 diabetes mellitus with hyperglycemia, with long-term current use of insulin (H)       * blood glucose monitoring test strip    no brand specified    300 each    Use to test blood sugars 3 times daily or as directed    Type 2 diabetes mellitus with hyperglycemia, with long-term current use of insulin (H)       * blood glucose monitoring test strip    FREESTYLE TEST STRIPS    200 strip    Use to test blood sugar 3 times daily or as directed.    Type 2 diabetes mellitus with hyperglycemia, with long-term current use of insulin (H)       ICAPS AREDS FORMULA Tabs      Take 1 tablet by mouth 2 times daily        insulin aspart 100 UNIT/ML  injection    NovoLOG FLEXPEN    15 mL    5 units sub-cut twice daily with meals    Type 2 diabetes mellitus with hyperglycemia, with long-term current use of insulin (H)       insulin glargine 100 UNIT/ML injection    LANTUS SOLOSTAR    15 mL    Inject 36 Units Subcutaneous At Bedtime    Type 2 diabetes mellitus with hyperglycemia, without long-term current use of insulin (H)       insulin pen needle 32G X 6 MM    NOVOFINE    25 each    Use with insulin pen four times daily or as directed    Type 2 diabetes mellitus with stage 2 chronic kidney disease, with long-term current use of insulin (H)       lisinopril 10 MG tablet    PRINIVIL/ZESTRIL    90 tablet    Take 2 tablets (20 mg) by mouth daily    Benign essential hypertension       metFORMIN 500 MG tablet    GLUCOPHAGE    180 tablet    Take 1 tablet (500 mg) by mouth 2 times daily (with meals)    Type 2 diabetes mellitus with stage 2 chronic kidney disease, with long-term current use of insulin (H)       ONETOUCH LANCETS Misc     100 each    Use to test blood sugars 3-4 times daily or as directed. (Please dispense LifeScan or preferred brand by insurance)    Type 2 diabetes mellitus without complication (H)       * Notice:  This list has 2 medication(s) that are the same as other medications prescribed for you. Read the directions carefully, and ask your doctor or other care provider to review them with you.

## 2018-10-04 NOTE — LETTER
October 5, 2018      Elie Martinez  [address]   [place] [address]        Dear Elie,    The blood sugar (A1c) continues to improve.  Keep going with the current treatment as discussed with Dr. Hammer    Please see below for your test results.    Resulted Orders   Hemoglobin A1c ([facility])   Result Value Ref Range    Hemoglobin A1C 8.5 (H) 4.1 - 5.7 %       If you have any questions, please call the clinic to make an appointment.    Sincerely,    Jairo Hammer MD

## 2018-10-04 NOTE — PROGRESS NOTES
Please call patient and send results letter    The blood sugar (A1c) continues to improve.  Keep going with the current treatment as discussed with Dr. Hammer.

## 2018-10-08 NOTE — TELEPHONE ENCOUNTER
Glucoses reviewed.  Discussed with patient by phone.    Recommend increasing Lantus insulin from 38 units to 40 units once daily.    Continue Novolog 5 units with 3 meals daily.    F/U in clinic with clinic appt in 3 weeks.

## 2018-10-08 NOTE — TELEPHONE ENCOUNTER
Pt called to update blood sugar readings for Dr. Gutierrez & Dr. Hammer.    38 units of Lantus once daily, 5 units of Novolog 3 times daily.              7-8AM    3-4PM    8-9PM  10/2:  153        141        160  10/3:  155        116        169  10/4:  110        156        171  10/5:  138        151        169  10/6:  142        169        189  10/7:  139        136        181  10/8:  141        122

## 2018-10-09 ENCOUNTER — TELEPHONE (OUTPATIENT)
Dept: FAMILY MEDICINE | Facility: CLINIC | Age: 83
End: 2018-10-09

## 2018-10-09 NOTE — TELEPHONE ENCOUNTER
Spoke with the patient regarding symptoms. He states he has had diarrhea for two days that is intermittent but only having liquid stools. Patient denies any other symptoms such as blood in stool, nausea, vomiting, dizziness, rapid HR, fever, SOB, chest pain. Patient was recently given instructions to increase his lantus and metformin per 's note. Advised patient metformin increase can upset the stomach in the first few days. Advised patient to increase water intake r/t possibility of dehydration during bout of diarrhea, also encouraged proper nutrition and electrolytes. Requested patient call back tomorrow if diarrhea has not begun to improve. Patient verbalized understanding. Will route to  for ISRAEL Olivarez RN

## 2018-10-09 NOTE — TELEPHONE ENCOUNTER
[facility] phone call message-patient reporting a symptom:     Symptom: Diarrhea x2 days    Same Day Visit Offered:     Additional comments: Patient states that he would like Dr. Hammer to call him, states that he has been having extreme diarrhea for the last two days.     OK to leave message on voice mail? Yes    Primary language: English      needed? No    Call taken on October 9, 2018 at 9:56 AM by Cherie Linares

## 2018-11-05 ENCOUNTER — TELEPHONE (OUTPATIENT)
Dept: FAMILY MEDICINE | Facility: CLINIC | Age: 83
End: 2018-11-05

## 2018-11-05 ENCOUNTER — TRANSFERRED RECORDS (OUTPATIENT)
Dept: HEALTH INFORMATION MANAGEMENT | Facility: CLINIC | Age: 83
End: 2018-11-05

## 2018-11-05 ENCOUNTER — OFFICE VISIT (OUTPATIENT)
Dept: FAMILY MEDICINE | Facility: CLINIC | Age: 83
End: 2018-11-05
Payer: MEDICARE

## 2018-11-05 VITALS
OXYGEN SATURATION: 96 % | BODY MASS INDEX: 29.45 KG/M2 | TEMPERATURE: 97.4 F | HEART RATE: 84 BPM | RESPIRATION RATE: 18 BRPM | WEIGHT: 217.4 LBS | DIASTOLIC BLOOD PRESSURE: 62 MMHG | SYSTOLIC BLOOD PRESSURE: 128 MMHG

## 2018-11-05 DIAGNOSIS — I10 ESSENTIAL HYPERTENSION: Primary | ICD-10-CM

## 2018-11-05 DIAGNOSIS — E11.65 TYPE 2 DIABETES MELLITUS WITH HYPERGLYCEMIA, WITHOUT LONG-TERM CURRENT USE OF INSULIN (H): ICD-10-CM

## 2018-11-05 LAB
BUN SERPL-MCNC: 22 MG/DL (ref 7–30)
CALCIUM SERPL-MCNC: 10 MG/DL (ref 8.5–10.4)
CHLORIDE SERPLBLD-SCNC: 111 MMOL/L (ref 94–109)
CO2 SERPL-SCNC: 25 MMOL/L (ref 20–32)
CREAT SERPL-MCNC: 1.3 MG/DL (ref 0.8–1.5)
EGFR CALCULATED (BLACK REFERENCE): 67.3 ML/MIN
EGFR CALCULATED (NON BLACK REFERENCE): 55.6 ML/MIN
GLUCOSE SERPL-MCNC: 172 MG/DL (ref 60–109)
POTASSIUM SERPL-SCNC: 4.5 MMOL/L (ref 3.4–5.3)
SODIUM SERPL-SCNC: 146 MMOL/L (ref 133–144)

## 2018-11-05 RX ORDER — LISINOPRIL 20 MG/1
20 TABLET ORAL DAILY
Qty: 90 TABLET | Refills: 1 | Status: SHIPPED | OUTPATIENT
Start: 2018-11-05 | End: 2019-04-17

## 2018-11-05 NOTE — TELEPHONE ENCOUNTER
[facility] phone call message- general phone call:    Reason for call: Pt stated he would like to update Dr. Gutierrez with his medications. He stated he's taking Metformin 500 MG 2X's daily, Atorvastatin 40 MG once daily, and Lisinopril 20 MG once daily.    Return call needed: No    OK to leave a message on voice mail? Yes    Primary language: English      needed? No    Call taken on November 5, 2018 at 12:21 PM by Nelia Kenney

## 2018-11-05 NOTE — PROGRESS NOTES
Please call with results and recommendations AND send letter with results and recommendations.    Your kidney function is similar to one year ago.  I recommend you return for repeat kidney function test in 2 months

## 2018-11-05 NOTE — PROGRESS NOTES
"Subjective: 85-year-old male with a history of cerebrovascular disease status post endarterectomy, hypertension, type 2 diabetes.    Presents for chronic disease management.  As far as his hypertension on August 13, 2018 I increased his lisinopril from 10-20 mg each day due to consistently elevated blood pressures above goal.  He had a GFR of 75 in May 2018.  He has been taking to 10 mg lisinopril tablets each morning since August the.  He denies any dizziness lightheadedness or orthostatic symptoms.  No cough.  He does not have his medications with him today.  Versus diabetes he attempted to increase his metformin dose over the past month but had loose stools.  He is currently taking a tablet of metformin \"twice a day and I think they are 500 mg\".  It is unclear whether these are extended release or regular release.  He is taking Lantus insulin 40 units once daily.  He is using NovoLog insulin 5 units 3 times daily with each of his meals.  He has been calling in blood glucose readings every Monday we have titrated insulin over the phone of.  His blood glucoses have improved significantly over the past 2 months.  Over the past week his blood sugars in the morning are  mainly in the 100-120 range.  His afternoon and evening glucoses are ranging in the 133-186 range mainly in the 160s.  He has not had any glucose readings less than 90.  He has not had hypoglycemic symptoms.  No recent visual changes.    Review of systems: He feels his weight is stable.  Normal appetite.  No change in his energy.  Denies any depressive symptoms.  No chest pain episodes or palpitations.  Loose stools have resolved since reducing his metformin dose.  No respiratory symptoms.  No skin wounds or foot complaints    Exam: Repeat blood pressure is at goal  General: Alert no distress answers medical questions with some detail.  1 medication dose is unclear.  HEENT: Head is normocephalic and atraumatic.  Sclera nonicteric.  Moist mucous " membranes.  Neck is free of adenopathy.  Cardiovascular: Regular rate and rhythm without murmur.  Respiratory: Lungs clear bilaterally  Abdomen: Soft nontender  Extremities Free of edema    Assessment and plan  1)  hypertension: Blood pressure at goal today.  Goal blood pressure 140/90 or less due to his diabetes and known cerebrovascular disease.  Lisinopril was increased from 10-20 mg in May mid August 2018.  GFR was 75 in May 2018.    Basic metabolic profile today.  Continue lisinopril 20 mg each day if creatinine has not increased more than 30% or electrolyte changes.    2)Type 2 diabetes: His A1c is improving, 8.5% October 4, 2018.  Home glucose readings are now all at goal without hypoglycemia.  Continue Lantus insulin 40 units daily.  Continue NovoLog 5 units 3 times daily with meals.  I would likely replace meal insulin with an SGL2 in the future if the cost of the medication becomes affordable.  Continue to monitor closely for hypoglycemia.  He will call the clinic after having a snack for any blood glucose readings less than 80.    I will have him call the medical assistant today to clarify his current metformin dose.  We will not likely be able to increase beyond the current dose due to recurrent loose stools with attempts at increasing the dose.      Reminded him of yearly eye exams.  He is on a daily aspirin due to cerebrovascular disease  Daily statin  Plan for foot exam next visit.    He can discontinue weekly calls for insulin titration.  Continue 3 times daily glucose monitoring.  Call for any glucose readings less than 80.  Otherwise review glucoses at next visit in January.    Next A1c early January 2019.  Goal A1c 8.0% or less.    3) cerebrovascular disease status post carotid endarterectomy: Secondary prevention with aspirin, statin, management of hypertension and diabetes.    Patient called post visit and confirmed Metformin 500mg twice daily and Lisinopril 20mg daily

## 2018-11-05 NOTE — MR AVS SNAPSHOT
After Visit Summary   11/5/2018    Elie Martinez    MRN: [id number]           Patient Information     Date Of Birth          9/8/1932        Visit Information        Provider Department      11/5/2018 8:20 AM Jhon Gutierrez MD [facility]        Today's Diagnoses     Essential hypertension    -  1    Type 2 diabetes mellitus with hyperglycemia, without long-term current use of insulin (H)           Follow-ups after your visit        Who to contact     Please call your clinic at [phone] to:    Ask questions about your health    Make or cancel appointments    Discuss your medicines    Learn about your test results    Speak to your doctor            Additional Information About Your Visit        Care EveryWhere ID     This is your Care EveryWhere ID. This could be used by other organizations to access your [facility] medical records  [id number]        Your Vitals Were     Pulse Temperature Respirations Pulse Oximetry BMI (Body Mass Index)       84 97.4  F (36.3  C) (Oral) 18 96% 29.45 kg/m2        Blood Pressure from Last 3 Encounters:   11/05/18 128/62   10/04/18 157/71   09/06/18 169/73    Weight from Last 3 Encounters:   11/05/18 217 lb 6.4 oz (98.6 kg)   10/04/18 214 lb (97.1 kg)   09/06/18 212 lb (96.2 kg)              We Performed the Following     Basic Metabolic Panel ([facility]) - Results < 1 hr          Today's Medication Changes          These changes are accurate as of 11/5/18  9:29 AM.  If you have any questions, ask your nurse or doctor.               These medicines have changed or have updated prescriptions.        Dose/Directions    insulin aspart 100 UNIT/ML injection   Commonly known as:  NovoLOG FLEXPEN   This may have changed:    - when to take this  - additional instructions   Used for:  Type 2 diabetes mellitus with hyperglycemia, with long-term current use of insulin (H)        5 units sub-cut twice daily with meals   Quantity:  15 mL   Refills:  1       insulin  glargine 100 UNIT/ML injection   Commonly known as:  LANTUS SOLOSTAR   This may have changed:  how much to take   Used for:  Type 2 diabetes mellitus with hyperglycemia, without long-term current use of insulin (H)   Changed by:  Jhon Gutierrez MD        Dose:  40 Units   Inject 40 Units Subcutaneous At Bedtime   Quantity:  15 mL   Refills:  1       lisinopril 20 MG tablet   Commonly known as:  PRINIVIL/ZESTRIL   This may have changed:  medication strength   Used for:  Essential hypertension   Changed by:  Jhon Gutierrez MD        Dose:  20 mg   Take 1 tablet (20 mg) by mouth daily   Quantity:  90 tablet   Refills:  1            Where to get your medicines      These medications were sent to [facility]  [address] [address]     Phone:  [phone]     lisinopril 20 MG tablet         Some of these will need a paper prescription and others can be bought over the counter.  Ask your nurse if you have questions.     You don't need a prescription for these medications     insulin glargine 100 UNIT/ML injection                Primary Care Provider Office Phone # Fax #    Jhon Gutierrez [phone]       [address] [place] [address]        Equal Access to Services     TUAN MEDEIROS AH: Hadii tristian garrett hadasho Soomaali, waaxda luqadaha, qaybta kaalmada adeegyada, carlos balbuenain hayarleen hernandez. So [facility] [phone].    ATENCIÓN: Si habla español, tiene a ponce disposición servicios gratuitos de asistencia lingüística. Llame al [phone].    We comply with applicable federal civil rights laws and [place] laws. We do not discriminate on the basis of race, color, national origin, age, disability, sex, sexual orientation, or gender identity.            Thank you!     Thank you for choosing [facility]  for your care. Our goal is always to provide you with excellent care. Hearing back from our patients  is one way we can continue to improve our services. Please take a few minutes to complete the written survey that you may receive in the mail after your visit with us. Thank you!             Your Updated Medication List - Protect others around you: Learn how to safely use, store and throw away your medicines at www.disposemymeds.org.          This list is accurate as of 11/5/18  9:29 AM.  Always use your most recent med list.                   Brand Name Dispense Instructions for use Diagnosis    aspirin 325 MG tablet     90 tablet    Take 1 tablet (325 mg) by mouth daily    Cerebral vascular disease       atorvastatin 40 MG tablet    LIPITOR    90 tablet    Take 1 tablet (40 mg) by mouth daily    Cerebral vascular disease       blood glucose lancets standard    no brand specified    300 each    Use to test blood sugar 3 times daily or as directed.    Type 2 diabetes mellitus with hyperglycemia, with long-term current use of insulin (H)       blood glucose monitoring meter device kit    no brand specified    1 kit    Use to test blood sugar 3 times daily or as directed.    Type 2 diabetes mellitus with hyperglycemia, with long-term current use of insulin (H)       * blood glucose monitoring test strip    no brand specified    300 each    Use to test blood sugars 3 times daily or as directed    Type 2 diabetes mellitus with hyperglycemia, with long-term current use of insulin (H)       * blood glucose monitoring test strip    FREESTYLE TEST STRIPS    200 strip    Use to test blood sugar 3 times daily or as directed.    Type 2 diabetes mellitus with hyperglycemia, with long-term current use of insulin (H)       ICAPS AREDS FORMULA Tabs      Take 1 tablet by mouth 2 times daily        insulin aspart 100 UNIT/ML injection    NovoLOG FLEXPEN    15 mL    5 units sub-cut twice daily with meals    Type 2 diabetes mellitus with hyperglycemia, with long-term current use of insulin (H)       insulin glargine 100 UNIT/ML  injection    LANTUS SOLOSTAR    15 mL    Inject 40 Units Subcutaneous At Bedtime    Type 2 diabetes mellitus with hyperglycemia, without long-term current use of insulin (H)       insulin pen needle 32G X 6 MM    NOVOFINE    25 each    Use with insulin pen four times daily or as directed    Type 2 diabetes mellitus with stage 2 chronic kidney disease, with long-term current use of insulin (H)       lisinopril 20 MG tablet    PRINIVIL/ZESTRIL    90 tablet    Take 1 tablet (20 mg) by mouth daily    Essential hypertension       metFORMIN 500 MG tablet    GLUCOPHAGE    180 tablet    Take 1 tablet (500 mg) by mouth 2 times daily (with meals)    Type 2 diabetes mellitus with stage 2 chronic kidney disease, with long-term current use of insulin (H)       ONETOUCH LANCETS Misc     100 each    Use to test blood sugars 3-4 times daily or as directed. (Please dispense LifeScan or preferred brand by insurance)    Type 2 diabetes mellitus without complication (H)       * Notice:  This list has 2 medication(s) that are the same as other medications prescribed for you. Read the directions carefully, and ask your doctor or other care provider to review them with you.

## 2018-11-12 ENCOUNTER — TRANSFERRED RECORDS (OUTPATIENT)
Dept: HEALTH INFORMATION MANAGEMENT | Facility: CLINIC | Age: 83
End: 2018-11-12

## 2018-11-14 ENCOUNTER — TELEPHONE (OUTPATIENT)
Dept: FAMILY MEDICINE | Facility: CLINIC | Age: 83
End: 2018-11-14

## 2018-11-14 NOTE — TELEPHONE ENCOUNTER
[facility] Family Medicine phone call message- general phone call:    Reason for call: Patient stated he wants to update Dr. Gutierrez with his blood count from 11/05/18-11/12/18. He stated he's been using 5 units of novolog 3 times daily, along with 40 units of lantus. Times includes are for mornings between 7-8, afternoon between 2-3, nights between 8-9    11/5: 120 for morning, 127 for afternoon, 159 for night  11/6: 97 for morning, 119 for afternoon, 144 for night  11/7: 99 for morning, 143 for afternoon, 117 for night  11/8: 99 for morning, 129 for afternoon, 126 for night  11/9: 114 for morning, 121 for afternoon, 108 for night  11/10: 90 for morning, 121 for afternoon, 129 for night  11/11: 108 for morning, 131 for afternoon, 135 for night  11/12: 121 for morning, 139 for afternoon, 139 for night    Return call needed: No    OK to leave a message on voice mail? Yes    Primary language: English      needed? No    Call taken on November 14, 2018 at 9:21 AM by Nelia Kenney

## 2018-11-17 NOTE — TELEPHONE ENCOUNTER
Glucoses reviewed.    Glucoses are now all at goal levels. No hypoglycemia.    A1c in May 2018 was 11.7%, August 9.9%, October 8.5%.  Goal A1c 8.0% due to age, long-standing DM (20 years), stage 2 chronic kidney disease.    Continue to monitor blood glucoses three times daily.      Likely will recommend a trial of discontinuing meal insulin in the next 4 weeks if glucoses remain <140 fasting, <180 postprandial.      DAISHA Gutierrez MD

## 2018-11-19 ENCOUNTER — TELEPHONE (OUTPATIENT)
Dept: FAMILY MEDICINE | Facility: CLINIC | Age: 83
End: 2018-11-19

## 2018-11-19 NOTE — TELEPHONE ENCOUNTER
[facility] Family Medicine phone call message- general phone call:    Reason for call: Blood sugars  5 units novolog 3x/daily 40 units lantus 1x/daily                   AM 7-8     Afternoon 2-3       Night 8-9  11/11/18    108         131                     135  11/12/18    127          139                    138  11/13/18    125          130                     143  11/14/18    130          119                     131   11/15/18    109          122                      141   11/16/18    108          98                       137   11/17/18    98            112                      138  11/18/18    109          115                      98  11/19/18    105    Return call needed: No    OK to leave a message on voice mail? Yes    Primary language: English      needed? No    Call taken on November 19, 2018 at 9:07 AM by Cherie Linares

## 2018-11-19 NOTE — TELEPHONE ENCOUNTER
Glucoses reviewed.     Glucoses are now all at goal levels. No hypoglycemia.     A1c in May 2018 was 11.7%, August 9.9%, October 8.5%.  Goal A1c 8.0% due to age, long-standing DM (20 years), stage 2 chronic kidney disease.     Continue to monitor blood glucoses three times daily.       Likely will recommend a trial of discontinuing meal insulin in the next 4 weeks if glucoses remain <140 fasting, <180 postprandial.    Jhon Gutierrez MD

## 2018-12-04 ENCOUNTER — TELEPHONE (OUTPATIENT)
Dept: FAMILY MEDICINE | Facility: CLINIC | Age: 83
End: 2018-12-04

## 2018-12-04 DIAGNOSIS — E11.65 TYPE 2 DIABETES MELLITUS WITH HYPERGLYCEMIA, WITHOUT LONG-TERM CURRENT USE OF INSULIN (H): ICD-10-CM

## 2018-12-04 NOTE — TELEPHONE ENCOUNTER
Message to physician: N/A    Date of last visit: 11/5/2018     Date of next visit if scheduled: Visit date 12/10/2018    Last Comprehensive Metabolic Panel:  Sodium   Date Value Ref Range Status   11/05/2018 146.0 (H) 133.0 - 144.0 mmol/L Final     Potassium   Date Value Ref Range Status   11/05/2018 4.5 3.4 - 5.3 mmol/L Final     Chloride   Date Value Ref Range Status   11/05/2018 111.0 (H) 94.0 - 109.0 mmol/L Final     Carbon Dioxide   Date Value Ref Range Status   11/05/2018 25.0 20.0 - 32.0 mmol/L Final     Glucose   Date Value Ref Range Status   11/05/2018 172.0 (H) 60.0 - 109.0 mg/dL Final     Urea Nitrogen   Date Value Ref Range Status   11/05/2018 22.0 7.0 - 30.0 mg/dL Final     Creatinine   Date Value Ref Range Status   11/05/2018 1.3 0.8 - 1.5 mg/dL Final     GFR Estimate   Date Value Ref Range Status   01/05/2017 52 (L) >60 mL/min/1.73m2 Final     Calcium   Date Value Ref Range Status   11/05/2018 10.0 8.5 - 10.4 mg/dL Final       BP Readings from Last 3 Encounters:   11/05/18 128/62   10/04/18 157/71   09/06/18 169/73       Lab Results   Component Value Date    A1C 8.5 10/04/2018    A1C 9.9 08/13/2018    A1C 11.7 05/14/2018    A1C 12.8 01/29/2018    A1C 9.3 09/11/2017                Please complete refill and CLOSE ENCOUNTER.  Closing the encounter signifies the refill is complete.

## 2018-12-04 NOTE — TELEPHONE ENCOUNTER
[facility] Medicine phone call message- patient requesting a refill:    Full Medication Name: Lantus pen    Dose: 100 unit/ml    Pharmacy confirmed as   [facility] - [place], [place] - [address] [facility] AT [facility] [address]  [address] [facility] [address]  Phone: [phone] Fax: [phone]    [facility] [place], [place] - [address] [facility]  [address] [facility] [address]  Phone: [phone] Fax: [phone]    [facility] - [place], [place] - [address] [place] [address] AT [facility] & [address] [place] [address]  Phone: [phone] Fax: [phone]  : No, see notes below     Additional Comments: Patient would like 1 unit called into to [facility] on [facility].  by tomorrow(12/05/18) then the rest of the prescription sent to Scripts and would like this called after [facility] has already called.      OK to leave a message on voice mail? Yes    Primary language: English      needed? No    Call taken on December 4, 2018 at 9:20 AM by Cherie Linares

## 2018-12-04 NOTE — TELEPHONE ENCOUNTER
[facility] Family [facility] phone call message- general phone call:    Reason for call: Blood Sugar readings 5 Units novolog 3x/daily  40 Units lantus 1x/daily    Date           7-8 am      2-3 pm       8-9 pm   11/26/18     136             149            136  11/27/18     140           147             140   11/28/19     129            149             141  11/29/18     119           158            139  11/30/18     126           145            139  12/01/18     112           187            129   12/02/18     115          119            140  12/03/18     112          126            132  12/04/18     139        Return call needed: No    OK to leave a message on voice mail? Yes    Primary language: English      needed? No    Call taken on December 4, 2018 at 9:02 AM by Cherie Linares

## 2018-12-04 NOTE — TELEPHONE ENCOUNTER
[facility] phone call message- patient requesting a refill:    Full Medication Name: insulin glargine (LANTUS SOLOSTAR) 100 UNIT/ML pen    Dose:     Pharmacy confirmed as   [facility] [address] - [place], [place] - [address] [facility] AT [facility] [address]  [address] [place] [address]  Phone: [phone] Fax: [phone]    [facility]  for [place], [place] - [address] [place] [address]  Phone: [phone] Fax: [phone]    [facility] [address] - [place], [place] - [address] [place] [address] & [address] [place] [address]  Phone: [phone] Fax: [phone]  : No: Patient prefers [facility] in [place].    Additional Comments: Patient called stating if his refill has been filled already because he stated he is out of the medication and would like it the medication to be refilled as soon as possible. Please call and advise.     OK to leave a message on voice mail? Yes    Primary language: English      needed? No    Call taken on December 4, 2018 at 2:48 PM by Nelia Kenney

## 2018-12-06 NOTE — TELEPHONE ENCOUNTER
Glucoses reviewed.      Glucoses all at goal levels. No hypoglycemia.      A1c in May 2018 was 11.7%  August 2018 9.9%  October 2018  8.5%      Goal A1c 8.0% due to age, long-standing DM (20 years), stage 2 chronic kidney disease.      Continue to monitor blood glucoses three times daily.        Likely will recommend a trial of discontinuing meal insulin at our upcoming appt 12/10/18 if glucoses remain <140 fasting, <180 postprandial.    Plan A1c in January 2019.

## 2018-12-10 ENCOUNTER — OFFICE VISIT (OUTPATIENT)
Dept: FAMILY MEDICINE | Facility: CLINIC | Age: 83
End: 2018-12-10
Payer: MEDICARE

## 2018-12-10 VITALS
TEMPERATURE: 97.9 F | WEIGHT: 211.8 LBS | BODY MASS INDEX: 28.69 KG/M2 | SYSTOLIC BLOOD PRESSURE: 115 MMHG | OXYGEN SATURATION: 98 % | HEIGHT: 72 IN | RESPIRATION RATE: 16 BRPM | DIASTOLIC BLOOD PRESSURE: 69 MMHG | HEART RATE: 94 BPM

## 2018-12-10 DIAGNOSIS — Z23 IMMUNIZATION DUE: Primary | ICD-10-CM

## 2018-12-10 DIAGNOSIS — I10 ESSENTIAL HYPERTENSION: ICD-10-CM

## 2018-12-10 DIAGNOSIS — E11.65 TYPE 2 DIABETES MELLITUS WITH HYPERGLYCEMIA, WITH LONG-TERM CURRENT USE OF INSULIN (H): ICD-10-CM

## 2018-12-10 DIAGNOSIS — Z79.4 TYPE 2 DIABETES MELLITUS WITH HYPERGLYCEMIA, WITH LONG-TERM CURRENT USE OF INSULIN (H): ICD-10-CM

## 2018-12-10 LAB — HBA1C MFR BLD: 7.4 % (ref 4.1–5.7)

## 2018-12-10 ASSESSMENT — MIFFLIN-ST. JEOR: SCORE: 1673.23

## 2018-12-10 NOTE — PATIENT INSTRUCTIONS
Your medication list is printed, please keep this with you, it is helpful to bring this current list to any other medical appointments, the emergency room or hospital.    If you had lab testing today and your results are reassuring or normal they will be be mailed to you within 7 days.     If the lab tests need quick action we will call you with the results.   The phone number we will call with results is # [phone] (home) . If this is not the best number please call our clinic and change the number.    If you need any refills please call your pharmacy and they will contact us.    If you have any further concerns or wish to schedule another appointment you must call our office during normal business hours  [phone] (8-5:00 M-F)  If you have urgent medical questions that cannot wait  you may also call [phone] at any time of day.  If you have a medical emergency please call [phone].    Thank you for coming to [facility].

## 2018-12-14 NOTE — PROGRESS NOTES
Assessment and plan  1) Well-controlled type 2 diabetes: A1c is 7.4% today with a goal of 8.0% or less.  Discontinue low-dose meal insulin.  Continue NovoLog Lantus 40 units daily continue metformin 500 mg twice daily (dose cannot be increased any further due to loose stool side effects at higher doses)  Continue twice daily glucose monitoring for the next 4 weeks.  It would be reasonable to decrease to once daily + symptomatic glucose monitoring should he have stable glucoses  Return to clinic in 6-8 weeks  Repeat A1c in 3 months    Hypertension: Lisinopril dose was increased from 10 mg to 20 mg in August 2018.  Basic metabolic profile in November 2018 showed a GFR of 56 (61 in January 2018) and a normal electrolytes.  Continue lisinopril 20 mg daily  Repeat basic metabolic profile in about May 2019    Reminded him of yearly eye exams.  He is on a daily aspirin due to cerebrovascular disease  Daily statin  Plan for foot exam next visit.    History  85-year-old male with a history of cerebrovascular disease status post endarterectomy, hypertension, type 2 diabetes  Diabetes: He is taking metformin 500 mg twice daily.  He has not been able to take higher doses due to loose stool.  He continues with Lantus 40 units once a day and NovoLog insulin 5 units 3 times daily with meals.  He has been calling in blood glucose readings every Monday and I have been monitoring his glucose control.  His glucose control has improved significantly over the past 3 months.  His fasting glucoses over the past week have been 120-140, and postprandials 120-160s.  No glucose readings less than 100 and no hypoglycemic symptoms.    Hypertension: He has been on 20 mg of lisinopril, a dose increased from 10 mg since August.  No dizziness lightheadedness or orthostatic symptoms.  No chest pain episodes    Review of systems: His weight has been stable.  No upper respiratory symptoms.  Respiratory: No cough or wheeze  Cardiovascular: No chest  pain episodes of palpitations  Gastrointestinal: He has not had any return of loose stools since reducing his metformin dose a few months ago  No skin wounds on the feet    Exam  Vitals are reviewed with blood pressure at goal  HEENT: Head is neuropsych atraumatic.  Eyes sclera R noninjected.  Plantar membranes are white with normal bony and light landmarks  Moist mucous membranes  Neck is free of adenopathy  Cardiovascular: Regular rate and rhythm without murmur  Respiratory lungs clear all station bilaterally  Abdomen soft nontender  Extremities are free of edema

## 2018-12-27 ENCOUNTER — TELEPHONE (OUTPATIENT)
Dept: FAMILY MEDICINE | Facility: CLINIC | Age: 83
End: 2018-12-27

## 2018-12-27 DIAGNOSIS — E11.65 TYPE 2 DIABETES MELLITUS WITH HYPERGLYCEMIA, WITHOUT LONG-TERM CURRENT USE OF INSULIN (H): ICD-10-CM

## 2018-12-27 DIAGNOSIS — N18.2 TYPE 2 DIABETES MELLITUS WITH STAGE 2 CHRONIC KIDNEY DISEASE, WITH LONG-TERM CURRENT USE OF INSULIN (H): ICD-10-CM

## 2018-12-27 DIAGNOSIS — E11.22 TYPE 2 DIABETES MELLITUS WITH STAGE 2 CHRONIC KIDNEY DISEASE, WITH LONG-TERM CURRENT USE OF INSULIN (H): ICD-10-CM

## 2018-12-27 DIAGNOSIS — Z79.4 TYPE 2 DIABETES MELLITUS WITH STAGE 2 CHRONIC KIDNEY DISEASE, WITH LONG-TERM CURRENT USE OF INSULIN (H): ICD-10-CM

## 2018-12-27 NOTE — TELEPHONE ENCOUNTER
[facility] Family Medicine phone call message- patient requesting a refill:    Full Medication Name: Lantus pens    Dose:     Pharmacy confirmed as   [facility] - [place], [place] - [address] [facility] AT [facility] & [address]  [address] [place] [address]  Phone: [phone] Fax: [phone]    [facility]  for [facility] - [place], [place] - [address] [place] [address]  Phone: [phone] Fax: [phone]    [facility] [address] - [place], [place] - [address] [place], [place] [address]E [facility] & [address] [place] [address]  Phone: [phone] Fax: [phone]  :   No: [facility].     Additional Comments: Patient is completely out of med's     OK to leave a message on voice mail? Yes    Primary language: English      needed? No    Call taken on December 27, 2018 at 9:11 AM by Cherie Linares

## 2018-12-27 NOTE — TELEPHONE ENCOUNTER
[facility] Family Medicine phone call message- general phone call:    Reason for call: Blood sugar readings at 40 Units lantus  Date      Am       12-22     95       159  12-23    125     144  12-24     91      169   12-25    125     157   12-26     92      155  12-27     99    Return call needed: No    OK to leave a message on voice mail? Yes    Primary language: English      needed? No    Call taken on December 27, 2018 at 9:09 AM by Cherie Linares

## 2018-12-31 NOTE — TELEPHONE ENCOUNTER
Called pt and informed him of the message from Dr Downey. Pt will call back on Friday with his Blood Sugar readings.

## 2018-12-31 NOTE — TELEPHONE ENCOUNTER
Blood sugars looking good so far. Continue twice daily checking and call us again on Friday with those numbers (so Dr. Gutierrez can look at them on Monday).    Darrin Downey III, MD, FAAFP  [facility]  12/31/18 10:40 AM

## 2019-01-04 ENCOUNTER — TELEPHONE (OUTPATIENT)
Dept: FAMILY MEDICINE | Facility: CLINIC | Age: 84
End: 2019-01-04

## 2019-01-04 NOTE — TELEPHONE ENCOUNTER
Blood glucose readings reviewed.     No hypoglycemia.  Readings in goal range.    Spoke with patient by phone. No medication changes.    Call glucose readings to me in 3 weeks.    Call if any glucose readings <70 (after eating a snack)    DAISHA Gutierrez

## 2019-01-04 NOTE — TELEPHONE ENCOUNTER
[facility] Family Medicine phone call message- general phone call:    Reason for call: Patient is calling to update his blood sugar to Dr. Gutierrez. He states he is on 40 units lantus and tested his blood sugar at 7-8 and and 8-9 evening.     12/28 101-147  12/29 106-131  12/30 110-139  12/31   1/1 108-129  1/2   1/3 101/163      Return call needed: No    OK to leave a message on voice mail?     Primary language: English      needed? No    Call taken on January 4, 2019 at 9:18 AM by Kamran Salamanca

## 2019-01-14 ENCOUNTER — TELEPHONE (OUTPATIENT)
Dept: FAMILY MEDICINE | Facility: CLINIC | Age: 84
End: 2019-01-14

## 2019-01-14 NOTE — TELEPHONE ENCOUNTER
[facility] Family Medicine phone call message- general phone call:    Reason for call: FYI: Still on 40 units lantus and his blood sugar this morning was 121. He was to call and let Dr. Gutierrez know today.     Return call needed: No    OK to leave a message on voice mail?     Primary language: English      needed? No    Call taken on January 14, 2019 at 10:56 AM by Kamran Salamanca

## 2019-01-21 ENCOUNTER — OFFICE VISIT (OUTPATIENT)
Dept: FAMILY MEDICINE | Facility: CLINIC | Age: 84
End: 2019-01-21
Payer: MEDICARE

## 2019-01-21 VITALS
TEMPERATURE: 98.1 F | OXYGEN SATURATION: 94 % | RESPIRATION RATE: 16 BRPM | WEIGHT: 215 LBS | DIASTOLIC BLOOD PRESSURE: 80 MMHG | HEART RATE: 99 BPM | SYSTOLIC BLOOD PRESSURE: 137 MMHG | BODY MASS INDEX: 29.44 KG/M2

## 2019-01-21 DIAGNOSIS — N18.2 CHRONIC RENAL DISEASE, STAGE II: Primary | ICD-10-CM

## 2019-01-21 DIAGNOSIS — I10 ESSENTIAL HYPERTENSION: ICD-10-CM

## 2019-01-21 DIAGNOSIS — E11.65 TYPE 2 DIABETES MELLITUS WITH HYPERGLYCEMIA, WITH LONG-TERM CURRENT USE OF INSULIN (H): ICD-10-CM

## 2019-01-21 DIAGNOSIS — Z79.4 TYPE 2 DIABETES MELLITUS WITH HYPERGLYCEMIA, WITH LONG-TERM CURRENT USE OF INSULIN (H): ICD-10-CM

## 2019-01-21 LAB
BUN SERPL-MCNC: 21 MG/DL (ref 7–30)
CALCIUM SERPL-MCNC: 10.1 MG/DL (ref 8.5–10.4)
CHLORIDE SERPLBLD-SCNC: 103 MMOL/L (ref 94–109)
CO2 SERPL-SCNC: 26 MMOL/L (ref 20–32)
CREAT SERPL-MCNC: 1.2 MG/DL (ref 0.8–1.5)
EGFR CALCULATED (BLACK REFERENCE): 73.8 ML/MIN
EGFR CALCULATED (NON BLACK REFERENCE): 61 ML/MIN
GLUCOSE SERPL-MCNC: 186 MG/DL (ref 60–109)
POTASSIUM SERPL-SCNC: 4.6 MMOL/L (ref 3.4–5.3)
SODIUM SERPL-SCNC: 144 MMOL/L (ref 133–144)

## 2019-01-21 NOTE — PROGRESS NOTES
Assessment and plan  1. Type 2 diabetes: Last A1c 7.4% in December 2018, with a goal of 8.0% or less.  He discontinued low-dose meal insulin in December 2018  He continues Lantus insulin 40 units daily, metformin 500 mg twice daily (he cannot tolerate higher doses due to loose to find a side effect at higher doses).    No hypoglycemic symptoms or readings.  Home glucoses under good control.    Plan A1c in mid March 2019    2. Hypertension: Blood pressure well controlled on lisinopril 20 mg daily.  GFR 73 today, has returned to his baseline.  Continue lisinopril 20 mg daily    3. Stage II chronic kidney disease: GFR today 73 , he had a transient decrease in GFR at baseline.  When last checked November 2018, GFR was 75 in May 2018, GFR 50 6 November 2018, GFR 73 January 2019.    Repeat basic metabolic profile in 3-6 months (April - June 2019)        History  86-year-old male with type 2 diabetes, hypertension, stage II chronic kidney disease, history of cerebrovascular disease status post endarterectomy.    Diabetes: Last year his A1c was above goal.  He initiated insulin therapy and regained glycemic control.  In December 2018 he was able to discontinue his low-dose meal insulin and maintain good glycemic control.  He continues on Lantus 14 units daily, metformin 500 mg twice daily.  He checks his blood glucoses once daily sometimes fasting sometimes postprandial.  His fasting glucoses have been in the 105-120 range.  Postprandials 140s-150s.  No hypoglycemic readings or symptoms    Hypertension: As far as his hypertension he increased his lisinopril dose from 10 mg to 20 mg in August 2018.  No dizziness lightheadedness or orthostatic symptoms.    Stage I-II chronic kidney disease: Basic metabolic profile in November 2018 showed a GFR decreased to 56 down from 75 in May.  No change in his diet, urinary frequency, or other symptoms.    Review of systems: No recent illness.  No respiratory symptoms.  No chest pain  episodes or palpitations.  No new neurologic symptoms.  He continues to live independently in an apartment.  No skin wounds, foot wounds, paresthesias.  He has intermittent constipation versus loose stools, much less bothersome than earlier in the year since reducing his metformin dose    Exam  Vitals are reviewed and his blood pressure is at goal  General: He is alert and relates his own history  HEENT: Head is normal cephalic and atraumatic.  Eyes sclera nonicteric noninjected.  Moist mucous membranes.  Neck is free of adenopathy Thomas perivascular: Regular rate and rhythm without murmur  Respiratory lungs clear to auscultation bilaterally  Abdomen soft nontender  Extremities are free of edema    Laboratory  BMP today, GFR 73

## 2019-01-21 NOTE — RESULT ENCOUNTER NOTE
Please call results and recommendations  Your kidney function has returned to its usual range.  Please continue your current medications.    Schedule an appointment to see Dr. Gutierrez in mid March 2019, at that time we will check a 3-month blood sugar average (hemoglobin A1c)

## 2019-01-21 NOTE — PATIENT INSTRUCTIONS
Continue your current medication and checking her blood glucoses once daily    Return to clinic to see me (Dr. Gutierrez) in mid March for diabetes hemoglobin A1c    I will call you with your kidney function test results and determine if you need to see me prior to March and if we need to make any blood pressure medication adjustments

## 2019-01-21 NOTE — RESULT ENCOUNTER NOTE
Spoke to patient, gave results. Pt will call back to schedule an appointment as we are not able to schedule into March yet.

## 2019-01-28 DIAGNOSIS — E11.65 TYPE 2 DIABETES MELLITUS WITH HYPERGLYCEMIA, WITHOUT LONG-TERM CURRENT USE OF INSULIN (H): ICD-10-CM

## 2019-01-28 RX ORDER — BLOOD SUGAR DIAGNOSTIC
STRIP MISCELLANEOUS
Qty: 100 EACH | Refills: 0 | Status: SHIPPED | OUTPATIENT
Start: 2019-01-28 | End: 2019-01-29

## 2019-01-28 NOTE — TELEPHONE ENCOUNTER
Attempted to call patient with instructions to  new NPH and syringe/needle prescription from pharmacy. Patient did not answer, unable to leave VM. Called emergency contact (Favian) patient's son and advised for patient to  prescription and come into clinic during business hours to obtain education on use of NPH and syringes. Favian verbalized understanding and stated he will update the patient. Sher PARNELL

## 2019-01-28 NOTE — TELEPHONE ENCOUNTER
[facility] Family Medicine phone call message- patient requesting a refill:    Full Medication Name: insulin glargine (LANTUS SOLOSTAR PEN)     Dose: 100 UNIT/ML pen    Pharmacy confirmed as   [facility] - [place], [place] - [address] [place] [address] AT [facility] & [address]  [address] [place] [address]  Phone: [phone] Fax: [phone]    [facility]  for [place], [place] - [address] [place] [address]  [address] [place] [address]  Phone: [phone] Fax: [phone]    [facility] [address] - [place], [place] - [address] [place] [address] & [address] [place] [address]  Phone: [phone] Fax: [phone]  : No:    [facility] [address] [place] [address]: YES    Additional Comments: Patient states he needs a refill for the medication listed above. He states he would like for it to sent to [facility] [address] [place] [address]     OK to leave a message on voice mail? Yes    Primary language: English      needed? No    Call taken on January 28, 2019 at 9:06 AM by Nelia Kenney

## 2019-01-28 NOTE — TELEPHONE ENCOUNTER
Spoke with PharmD who recommended NPH insulin until pen arrives from [facility]. NPH will require a prescription for syringes as well, but should be a lower out of pocket cost for patient. Patient to come into clinic for teaching on how to use vial and syringes at their earliest convenience. Will route to PCP for review and ordering of medication and syringes if approved. Sher PARNELL

## 2019-01-28 NOTE — TELEPHONE ENCOUNTER
Called patient to discuss, patient stated he is completely out of lantus. He is not able to afford $160 at [facility], requested rush order with [facility]. Advised patient they do no do rush orders. Patient requested  be informed. Spoke with pharmacy, they stated patient's medical insurance will not cover much and out of pocket is $160; not related to denial from insurance. Spoke to  who requested I page PharmD to discuss. Paged PharmD, awaiting call-back. Patient stated okay to call and leave detailed VM on home phone. Sher PARNELL

## 2019-01-28 NOTE — TELEPHONE ENCOUNTER
Patient called to report that he has run out of Lantus insulin.  His mail order pharmacy will not deliver his Lantus insulin for up to 30 days.  A refill for Lantus insulin has been sent electronically to his mail order pharmacy.    Patient cannot afford Lantus insulin through local pharmacy.  Our triage nurse Rai discussed options with our pharmacist.  Plan for bridging NPH insulin which is the most affordable option for the patient until his Lantus insulin arise from the mail order pharmacy.    He takes Lantus 40 units once daily.  Blood glucoses are well controlled on this regimen.    ADDENDUM: Recommend initiating NPH at 12 units twice daily with meals.  I recommend this lower insulin dose to avoid the risk of hypoglycemia during this brief estimated 1-4-week bridging period.    ADDENDUM: Rx for NPH 12 units BID with meals and insulin syringes/needles sent [facility] the closest [facility] to his home    He will be seen in clinic today to have insulin administration instructions with our nurse.    Kendall ZUÑIGA

## 2019-01-28 NOTE — TELEPHONE ENCOUNTER
[facility] Family Medicine phone call message- medication clarification/question:    Full Medication Name:insulin glargine (LANTUS SOLOSTAR PEN)     Dose: 100 UNIT/ML pen    Question: Patient states the pharmacy told him that he would have to pay 160 dollars. He states if he can have this medication sent to [facility]  immediatly. Since he usually only pays about 24 dollars. He states he would like a call back and will be home until 1pm today.  Please call and advise.     Pharmacy confirmed as    [facility] - [place], [place] - [address] [place] [address] AT [facility] & [address]  [facility] - [place], [place] - [address] [facility] - [place], [place] - [address] [place] [address] E AT [facility] & [facility]: Yes    OK to leave a message on voice mail? Yes    Primary language: English      needed? No    Call taken on January 28, 2019 at 11:35 AM by Nelia Kenney

## 2019-01-29 ENCOUNTER — OFFICE VISIT (OUTPATIENT)
Dept: FAMILY MEDICINE | Facility: CLINIC | Age: 84
End: 2019-01-29
Payer: MEDICARE

## 2019-01-29 VITALS
DIASTOLIC BLOOD PRESSURE: 76 MMHG | RESPIRATION RATE: 18 BRPM | HEIGHT: 72 IN | WEIGHT: 211 LBS | SYSTOLIC BLOOD PRESSURE: 174 MMHG | BODY MASS INDEX: 28.58 KG/M2 | TEMPERATURE: 97.7 F | HEART RATE: 86 BPM

## 2019-01-29 DIAGNOSIS — E11.65 TYPE 2 DIABETES MELLITUS WITH HYPERGLYCEMIA, WITH LONG-TERM CURRENT USE OF INSULIN (H): Primary | ICD-10-CM

## 2019-01-29 DIAGNOSIS — I10 ESSENTIAL HYPERTENSION: ICD-10-CM

## 2019-01-29 DIAGNOSIS — Z79.4 TYPE 2 DIABETES MELLITUS WITH HYPERGLYCEMIA, WITH LONG-TERM CURRENT USE OF INSULIN (H): Primary | ICD-10-CM

## 2019-01-29 ASSESSMENT — MIFFLIN-ST. JEOR: SCORE: 1675.09

## 2019-01-29 NOTE — PROGRESS NOTES
Preceptor Attestation:   Patient seen, evaluated and discussed with the resident. I have verified the content of the note, which accurately reflects my assessment of the patient and the plan of care.  Supervising Physician:Aaron Smith MD  [facility]

## 2019-01-29 NOTE — PROGRESS NOTES
"  Subjective:   Elie Martinez is a 86 year old  male with DM2 who presents for:  Medication Problem (Lantus) and Medication Reconciliation (Completed)    Was Unable to get lantus.   Frustrated with lantus rx at [facility]. Changed from $38 to $160. When he gets it through [facility], cost is not an issue. It takes a week to get meds.   Hasn't run out completely - last shot was last night.   Has been using daughter's insulin- thinks it is lantus but unsure. He has used it in the past and glucoses were normal.     The patient speaks English, so no  was used for this visit.   ROS negative other than mentioned in HPI   History and medications listed below  Objective:   /76   Pulse 86   Temp 97.7  F (36.5  C) (Oral)   Resp 18   Ht 1.829 m (6')   Wt 95.7 kg (211 lb)   BMI 28.62 kg/m    Gen: NAD  Resp: respirations unlabored  Skin: no rashes on exposed skin  Psych: normal mood and affect, pleasant  Assessment and Plan     1. Type 2 diabetes mellitus with hyperglycemia, with long-term current use of insulin (H)  Initial plan today was for NPH initiation and teaching in order to bridge gap until he could obtain lantus, or as a permanent transition from lantus if cost were an ongoing issue. However, patient estimates only another 5 days of needing to borrow daughter's insulin, states that she is not at risk of running out, and that he will then \"repay\" her with his insulin. Discussed concern about taking a family member's insulin, and risk of possible different dosing. Patient is not interested in switching to NPH today. Encouraged him to closely monitor glucoses while on daughter's insulin, and to call if he finds out it is a different medication or would like help adjusting dose.     2. Essential hypertension  Elevated today. Normal last visit. Patient adamant that it is related to current irritation and stress, and is not interested in following this up at closer interval.     Follow up: 6 wk " with PCP for DM as planned. Sooner if difficulty getting lantus on time.     Elise Clemons MD, MPH  [facility]    Precepted patient with Aaron Smith MD  Discussed with PCP Dr. Gutierrez    Options for treatment and follow-up care were reviewed with the patient and/or guardian. Elie Martinez and/or guardian engaged in the decision making process and verbalized understanding of the options discussed and agreed with the final plan.

## 2019-01-29 NOTE — TELEPHONE ENCOUNTER
Spoke with Dr. Clemons who is seeing pt in clinic.    Patient will receive mail order Lantus expected in next 5 days. He has access to Lantus to get him through until mail order arrives.    Continue Lantus.     Discontinue order for NPH.  I called Em to cancel NPH insulin and syringe order.     EFREN Gutierrez

## 2019-01-30 DIAGNOSIS — E11.22 TYPE 2 DIABETES MELLITUS WITH STAGE 2 CHRONIC KIDNEY DISEASE, WITH LONG-TERM CURRENT USE OF INSULIN (H): ICD-10-CM

## 2019-01-30 DIAGNOSIS — N18.2 TYPE 2 DIABETES MELLITUS WITH STAGE 2 CHRONIC KIDNEY DISEASE, WITH LONG-TERM CURRENT USE OF INSULIN (H): ICD-10-CM

## 2019-01-30 DIAGNOSIS — Z79.4 TYPE 2 DIABETES MELLITUS WITH STAGE 2 CHRONIC KIDNEY DISEASE, WITH LONG-TERM CURRENT USE OF INSULIN (H): ICD-10-CM

## 2019-03-25 DIAGNOSIS — E11.65 TYPE 2 DIABETES MELLITUS WITH HYPERGLYCEMIA, WITHOUT LONG-TERM CURRENT USE OF INSULIN (H): ICD-10-CM

## 2019-03-25 NOTE — TELEPHONE ENCOUNTER
Message to physician: PLEASE SEND SHORT SUPPLY OF LANTUS  SOLOSTAR TO LOCAL PHARMACY /NEXT SHIPPING/PROCESS WILL 04/13/2019  PATIENT IS OUT OF LANTUS INSULIN    Date of last visit: 1/29/2019     Date of next visit if scheduled: Visit date 04/01/2019    Last Comprehensive Metabolic Panel:  Sodium   Date Value Ref Range Status   01/21/2019 144.0 133.0 - 144.0 mmol/L Final     Potassium   Date Value Ref Range Status   01/21/2019 4.6 3.4 - 5.3 mmol/L Final     Chloride   Date Value Ref Range Status   01/21/2019 103.0 94.0 - 109.0 mmol/L Final     Carbon Dioxide   Date Value Ref Range Status   01/21/2019 26.0 20.0 - 32.0 mmol/L Final     Glucose   Date Value Ref Range Status   01/21/2019 186.0 (H) 60.0 - 109.0 mg/dL Final     Urea Nitrogen   Date Value Ref Range Status   01/21/2019 21.0 7.0 - 30.0 mg/dL Final     Creatinine   Date Value Ref Range Status   01/21/2019 1.2 0.8 - 1.5 mg/dL Final     GFR Estimate   Date Value Ref Range Status   01/05/2017 52 (L) >60 mL/min/1.73m2 Final     Calcium   Date Value Ref Range Status   01/21/2019 10.1 8.5 - 10.4 mg/dL Final       BP Readings from Last 3 Encounters:   01/29/19 174/76   01/21/19 137/80   12/10/18 115/69       Lab Results   Component Value Date    A1C 7.4 12/10/2018    A1C 8.5 10/04/2018    A1C 9.9 08/13/2018    A1C 11.7 05/14/2018    A1C 12.8 01/29/2018                Please complete refill and CLOSE ENCOUNTER.  Closing the encounter signifies the refill is complete.

## 2019-03-27 ENCOUNTER — TELEPHONE (OUTPATIENT)
Dept: FAMILY MEDICINE | Facility: CLINIC | Age: 84
End: 2019-03-27

## 2019-03-27 NOTE — TELEPHONE ENCOUNTER
[facility] phone call message- medication clarification/question:    Full Medication Name: LANTUS   Dose:     Question: Patient is calling stating that pharmacy wants him to pay $300 dollars for the medication and he isn't going to do that right now. Wondering what he could do at the time. Didn't  the med's.   I looked on Goodrx site and they didn't have any coupons but on the lantus site they did offer something. Here is the link: [email or web address]://[email or web address]/sign-up/savings-[email or web address]-support    Pharmacy confirmed as    [id number] - [place], [place] - [address] AT [facility] & [address]  [facility]  FOR [facility] - [place], [place] - [address] [place], [place] [id number] - [place], [place] - [address] [place] [address] E AT [facility] & [facility]: No    OK to leave a message on voice mail? Yes    Primary language: English      needed? No    Call taken on March 27, 2019 at 10:20 AM by Cherie Linares

## 2019-04-01 ENCOUNTER — OFFICE VISIT (OUTPATIENT)
Dept: FAMILY MEDICINE | Facility: CLINIC | Age: 84
End: 2019-04-01
Payer: MEDICARE

## 2019-04-01 VITALS
OXYGEN SATURATION: 98 % | WEIGHT: 212.8 LBS | SYSTOLIC BLOOD PRESSURE: 160 MMHG | BODY MASS INDEX: 29.79 KG/M2 | HEIGHT: 71 IN | DIASTOLIC BLOOD PRESSURE: 64 MMHG | RESPIRATION RATE: 18 BRPM | HEART RATE: 103 BPM | TEMPERATURE: 98.6 F

## 2019-04-01 DIAGNOSIS — Z79.4 TYPE 2 DIABETES MELLITUS WITH STAGE 2 CHRONIC KIDNEY DISEASE, WITH LONG-TERM CURRENT USE OF INSULIN (H): ICD-10-CM

## 2019-04-01 DIAGNOSIS — E11.22 TYPE 2 DIABETES MELLITUS WITH STAGE 2 CHRONIC KIDNEY DISEASE, WITH LONG-TERM CURRENT USE OF INSULIN (H): ICD-10-CM

## 2019-04-01 DIAGNOSIS — E11.65 TYPE 2 DIABETES MELLITUS WITH HYPERGLYCEMIA, WITH LONG-TERM CURRENT USE OF INSULIN (H): Primary | ICD-10-CM

## 2019-04-01 DIAGNOSIS — I10 BENIGN ESSENTIAL HYPERTENSION: ICD-10-CM

## 2019-04-01 DIAGNOSIS — E11.65 TYPE 2 DIABETES MELLITUS WITH HYPERGLYCEMIA, WITHOUT LONG-TERM CURRENT USE OF INSULIN (H): ICD-10-CM

## 2019-04-01 DIAGNOSIS — N18.2 TYPE 2 DIABETES MELLITUS WITH STAGE 2 CHRONIC KIDNEY DISEASE, WITH LONG-TERM CURRENT USE OF INSULIN (H): ICD-10-CM

## 2019-04-01 DIAGNOSIS — Z79.4 TYPE 2 DIABETES MELLITUS WITH HYPERGLYCEMIA, WITH LONG-TERM CURRENT USE OF INSULIN (H): Primary | ICD-10-CM

## 2019-04-01 LAB
CREAT UR-MCNC: 172.7 MG/DL
HBA1C MFR BLD: 8 % (ref 4.1–5.7)
MICROALBUMIN UR-MCNC: 4.87 MG/DL (ref 0–1.99)
MICROALBUMIN/CREAT UR: 28.2 MG/G

## 2019-04-01 ASSESSMENT — MIFFLIN-ST. JEOR: SCORE: 1667.38

## 2019-04-01 NOTE — RESULT ENCOUNTER NOTE
Please send letter with results and recommendations.     As discussed in clinic, your 3 month blood sugar average is at your goal of 8.0% or less.  Continue your current medications.  We will repeat this test in about 3 months.    Your urine test again shows protein in the urine which is an early sign of strain on your kidneys from diabetes.  Continue your diabetes medication and blood pressure medication to protect your kidneys.     Return to see Dr. Gutierrez in 3 months, earlier as needed.

## 2019-04-01 NOTE — LETTER
April 8, 2019      Elie Martinez  [address]   [place] [address]        Dear Elie,    As discussed in clinic, your 3 month blood sugar average is at your goal of 8.0% or less.  Continue your current medications.  We will repeat this test in about 3 months.     Your urine test again shows protein in the urine which is an early sign of strain on your kidneys from diabetes.  Continue your diabetes medication and blood pressure medication to protect your kidneys.     Return to see Dr. Gutierrez in 3 months, earlier as needed.     Please see below for your test results.    Resulted Orders   Hemoglobin A1c ([facility])   Result Value Ref Range    Hemoglobin A1C 8.0 (H) 4.1 - 5.7 %   Microalbumin Creatinine Ratio Random Ur ([facility])   Result Value Ref Range    Microalbumin, Urine 4.87 (H) 0.00 - 1.99 mg/dL    Creatinine, Urine 172.7 mg/dL    Albumin Urine mg/g Cr 28.2 (H) <=19.9 mg/g    Narrative    Test performed by:  [facility]  [address] [place] [address]  Microalbumin, Random Urine  <2.0 mg/dL . . . . . . . . Normal  3.0-30.0 mg/dL . . . . . . Microalbuminuria  >30.0 mg/dL . . . . . .  . Clinical Proteinuria  Microalbumin/Creatinine Ratio, Random Urine  <20 mg/g . . . . .. . . . Normal   mg/g . . . . . . . Microalbuminuria  >300 mg/g . . . . . . . . Clinical Proteinuria       If you have any questions, please call the clinic to make an appointment.    Sincerely,    Jhon Gutierrez MD

## 2019-04-02 NOTE — PROGRESS NOTES
Subjective: 86-year-old male presents for chronic disease management    Type 2 diabetes his last A1c was 7.4% in December 2018.  He checks his glucoses about once daily at rotating times, often in the evening.  Glucoses have been running mostly 1 160s-170s up.  With a range of 145-188.  He eats similarly on most days breakfast consists of cereal usually oatmeal, sometimes cold cereal  Lunch is typically a sandwich with 2 loads of bread and diet soda  Dinner is variable but often meat and potatoes  His last eye exam was 4 months ago where he had a small change in his prescription hypertension:    Blood pressure is above goal in January and again today.  He has not yet taken his blood pressure medication this morning as he was in a hurry and forgot to take his pill.  He typically takes it in the morning around the time of breakfast.  Basic metabolic profile January 21, 2019 showed a GFR of 61, creatinine 1.20.  He does not check his blood pressures at home.  He denies any dizziness lightheadedness or orthostatic symptoms.    He has a history of a cerebrovascular accident which sounds like a possible retinal thrombosis affecting his left eye.  Shortly thereafter he underwent an endarterectomy which was greater than 5 years ago.    He has a history of prostate cancer and follows with [facility] urology every 6 months.    History of stage I chronic kidney disease with a GFR typically around 60.    Exam  Vitals are reviewed with blood pressure above goal at 147/62  HEENT: Head is atraumatic.  Eyes sclera nonicteric noninjected.  Pupils equal and reactive to light.  Moist mucous membranes.  No cervical adenopathy  Cardiovascular: Regular rate and rhythm without murmur   respiratory: Lungs are clear with fair air exchange bilaterally  Abdomen soft nontender  Extremities are free of edema    Assessment and plan  1) controlled type 2 diabetes: A1c today is 8.0%, up from 7.4% in December 2018.  Goal 8.0% or less.  We discussed  various options including increasing his Lantus insulin and dietary changes.  He elected to decrease his portion sizes particularly his carbohydrate dense breakfast of cereal, as well as reducing soda intake.  He will also try to increase his walking this spring and summer.  We also spent time talking about his ability to afford Lantus.  As long as the medication comes through his mail order pharmacy with enough to last him 90 days he is able to afford the medication.  I sent a new prescription to his mail order pharmacy.  Return in June or early July 2019 for repeat hemoglobin A1c.  Continue to monitor glucoses at least once daily at rotating times with a goal fasting glucose of less than 140, goal postprandial less than 200.  Return to clinic in 3-4 weeks if more than 3 glucoses above goal.  Metformin dose is limited by loose stools.  He discontinued glipizide back in June 2018 due to hyperglycemia.  He was on low-dose NovoLog meal insulin from June until December 2018.  Meal insulin was discontinued due to glucose readings at goal with only very low meal insulin dosing.  Microalbuminuria on urinalysis today.  He is on Lasix 20 mg daily.  Stage I chronic kidney disease with a GFR of 61 in January 2019.  He is due for a yearly eye exam in about November 2019  Could consider return to meal insulin if his A1c climbs above goal    2) hypertension above goal: He has not taken his medication today.  This is likely affecting his reading.  I recommend he check at home with a goal of 130/80  or less due to his history of cerebral vascular dz.   He will return to clinic if his blood pressure readings are consistently greater than goal.    3) history of prostate cancer following with [facility] urology every 6 months

## 2019-04-02 NOTE — TELEPHONE ENCOUNTER
Patient reports that the high insulin cost was only through his local pharmacy.  He is able to afford Lantus insulin through the mail order pharmacy which is covered by his insurance.  I sent a new prescription with a quantity sufficient for 90 days.  I asked the patient to be sure and check his insulin supply 1 week prior to the end of each month to ensure that he anticipates an adequate supply to avoid needing to use a local pharmacy which significantly increases his insulin cost.

## 2019-04-04 DIAGNOSIS — I67.9 CEREBRAL VASCULAR DISEASE: ICD-10-CM

## 2019-04-05 RX ORDER — ATORVASTATIN CALCIUM 40 MG/1
40 TABLET, FILM COATED ORAL DAILY
Qty: 90 TABLET | Refills: 3 | Status: SHIPPED | OUTPATIENT
Start: 2019-04-05 | End: 2020-03-30

## 2019-04-17 DIAGNOSIS — I10 ESSENTIAL HYPERTENSION: ICD-10-CM

## 2019-04-18 RX ORDER — LISINOPRIL 20 MG/1
20 TABLET ORAL DAILY
Qty: 90 TABLET | Refills: 3 | Status: SHIPPED | OUTPATIENT
Start: 2019-04-18 | End: 2020-04-17

## 2019-06-17 DIAGNOSIS — E11.65 TYPE 2 DIABETES MELLITUS WITH HYPERGLYCEMIA, WITHOUT LONG-TERM CURRENT USE OF INSULIN (H): ICD-10-CM

## 2019-07-15 ENCOUNTER — OFFICE VISIT (OUTPATIENT)
Dept: FAMILY MEDICINE | Facility: CLINIC | Age: 84
End: 2019-07-15
Payer: MEDICARE

## 2019-07-15 VITALS
TEMPERATURE: 97.4 F | SYSTOLIC BLOOD PRESSURE: 149 MMHG | DIASTOLIC BLOOD PRESSURE: 65 MMHG | HEIGHT: 72 IN | HEART RATE: 86 BPM | WEIGHT: 212.4 LBS | RESPIRATION RATE: 16 BRPM | OXYGEN SATURATION: 98 % | BODY MASS INDEX: 28.77 KG/M2

## 2019-07-15 DIAGNOSIS — I10 ESSENTIAL HYPERTENSION: ICD-10-CM

## 2019-07-15 DIAGNOSIS — E11.65 TYPE 2 DIABETES MELLITUS WITH HYPERGLYCEMIA, WITHOUT LONG-TERM CURRENT USE OF INSULIN (H): Primary | ICD-10-CM

## 2019-07-15 LAB — HBA1C MFR BLD: 8.4 % (ref 4.1–5.7)

## 2019-07-15 ASSESSMENT — MIFFLIN-ST. JEOR: SCORE: 1675.95

## 2019-07-15 NOTE — PROGRESS NOTES
HPI:       Elie Martinez is a 86 year old who presents for the following    Type 2 DM  Last A1c was 8.0% in April. Checks blood sugar once a day, usually at around 8-9pm. Continuing to use Lantus in the evening. Normally writes his blood sugars down, forgot the list today. Postprandial glucose typically runs between 155-195. Has been trying to change his diet by eating more fruits and vegetables since last visit. Does feel as though he is eating less carbohydrates than he was a few months ago. Has a small can of diet pop every lunch, otherwise drinks milk or water. Normally has cereal and a banana for breakfast or ron and eggs. For lunch he normally has a sandwich. Dinner varies greatly, only goes out to eat once or twice a week. Has been going on walks every morning for exercise. Has not had any symptoms of hypoglycemia.    Hypertension  He does not check his blood pressures at home. Denies any lightheadedness, dizziness, or orthostatic symptoms. Continuing to take lisinopril 20 mg daily. Has taken lisinopril today. Drinks 2 cups of coffee daily in the morning.    Prostate cancer  No longer requiring follow up with [facility] every 6 months.      Problem, Medication and Allergy Lists were reviewed and are current..    Patient is an established patient of this clinic..         Review of Systems:   CONSTITUTIONAL: no fatigue, no unexpected change in weight  SKIN: no worrisome rashes, no worrisome moles, no worrisome lesions  EYES: no acute vision problems or changes  RESP: no significant cough, no shortness of breath  CV: no chest pain, no palpitations, no new or worsening peripheral edema  GI: no nausea, no vomiting, no constipation, no diarrhea  NEURO: no numbness or parasthesias         Physical Exam:     Vitals:    07/15/19 1505 07/15/19 1506   BP: 168/70 149/65   Pulse: 86    Resp: 16    Temp: 97.4  F (36.3  C)    TempSrc: Oral    SpO2: 98%    Weight: 96.3 kg (212 lb 6.4 oz)    Height: 1.82 m (5'  "11.65\")      BP Readings from Last 6 Encounters:   07/15/19 149/65   04/01/19 160/64   01/29/19 174/76   01/21/19 137/80   12/10/18 115/69   11/05/18 128/62       Body mass index is 29.09 kg/m .  Vitals were reviewed  GENERAL: healthy, alert, well nourished, well hydrated, no distress  EYES: Eyes grossly normal to inspection, no icterus or injection  HENT: ear canals- normal; Left TM obstructed by cerumen, right TM normal; Nose- normal; Mouth- no ulcers, no lesions  NECK: no tenderness, well healed surgical scar on right neck  RESP: lungs clear to auscultation - no rales, no rhonchi, no wheezes  CV: regular rates and rhythm, normal S1 S2, no S3 or S4 and no murmur, no click or rub   ABDOMEN: soft, no tenderness, no  hepatosplenomegaly, no masses, normal bowel sounds  MSK: trace bilateral peripheral edema  Diabetic foot exam: corn at the left third metatarsal head; dystrophic, thickened yellow toenails x10; normal monofilament exam      Results:      Results from the last 24 hours  Results for orders placed or performed in visit on 07/15/19 (from the past 24 hour(s))   Hemoglobin A1c ([facility])   Result Value Ref Range    Hemoglobin A1C 8.4 (H) 4.1 - 5.7 %       Assessment and Plan     1. Type 2 diabetes mellitus  A1c today is 8.4%, up from 8.0% in 4/2019 but still within his goal of less than 8.5%.   Continue Lantus 40 units at bedtime. Recommended obtaining 1-2 fasting glucose reading a week, and continue at bedtime checks so if A1c continues to rise, Lantus dose can be adjusted more safely. Follow up for repeat A1c in October 2019.  -Microalbuminuria on urinalysis in 4/2019, currently on lisinopril 20mg daily.   -Stage I chronic kidney disease with a GFR of 61 in January 2019.   -Due for yearly eye exam in November 2019.    -Metformin dose is limited by loose stools.   -He discontinued glipizide back in June 2018 due to hypoglycemia.  -He was on low-dose NovoLog meal insulin from June until December 2018. Meal " insulin was discontinued due to glucose readings at goal with only very low meal insulin dosing.  -Patient has difficulty with complex medication regimens  - Hemoglobin A1c ([facility])      2. Hypertension  Blood pressure not under goal of 140/90 today. Recommend checking blood pressure at local fire station and recording the values on a sheet of paper. Will call after obtaining 6+ blood pressure readings. Continue lisinopril 20 mg daily, will consider increasing dose or adding on an additional medication (thiazide) if blood pressures at home are increased.    3. History of prostate cancer  No longer requiring follow up with [facility]    4. Hypercholesterolemia   Continue atorvastatin 40 mg daily    There are no discontinued medications.    Options for treatment and follow-up care were reviewed with the patient. Elie Martinez  engaged in the decision making process and verbalized understanding of the options discussed and agreed with the final plan.    Marisa Bridges, MS3    In supervising the medical student, I saw and evaluated the patient with the student and personally performed all aspects of the history and physical.  I have reviewed and verified that the documentation is correct and complete.    Jhon Gutierrez MD

## 2019-07-15 NOTE — PATIENT INSTRUCTIONS
Call with 8 to 10 blood pressure readings over the next month  Goal blood pressure is 140/90 or less, ideally 130/80      Continue your daily walking  Reduce any fried foods, sweets, pre-packaged foods    -check your blood sugar first thing in the morning before you eat once or twice per week, and continue to check each evening before bed  -bring your glucometer to your clinic visits.    Return to clinic in early October for a hemoglobin A1c (diabetes check)

## 2019-08-05 ENCOUNTER — OFFICE VISIT (OUTPATIENT)
Dept: FAMILY MEDICINE | Facility: CLINIC | Age: 84
End: 2019-08-05
Payer: MEDICARE

## 2019-08-05 VITALS
TEMPERATURE: 97.4 F | WEIGHT: 212 LBS | SYSTOLIC BLOOD PRESSURE: 130 MMHG | BODY MASS INDEX: 28.71 KG/M2 | OXYGEN SATURATION: 97 % | HEART RATE: 89 BPM | DIASTOLIC BLOOD PRESSURE: 74 MMHG | HEIGHT: 72 IN | RESPIRATION RATE: 16 BRPM

## 2019-08-05 DIAGNOSIS — E11.65 TYPE 2 DIABETES MELLITUS WITH HYPERGLYCEMIA, WITH LONG-TERM CURRENT USE OF INSULIN (H): Primary | ICD-10-CM

## 2019-08-05 DIAGNOSIS — I10 ESSENTIAL HYPERTENSION: ICD-10-CM

## 2019-08-05 DIAGNOSIS — Z79.4 TYPE 2 DIABETES MELLITUS WITH HYPERGLYCEMIA, WITH LONG-TERM CURRENT USE OF INSULIN (H): Primary | ICD-10-CM

## 2019-08-05 RX ORDER — METFORMIN HCL 500 MG
500 TABLET, EXTENDED RELEASE 24 HR ORAL 2 TIMES DAILY WITH MEALS
Qty: 180 TABLET | Refills: 3 | Status: SHIPPED | OUTPATIENT
Start: 2019-08-05 | End: 2019-12-16

## 2019-08-05 ASSESSMENT — MIFFLIN-ST. JEOR: SCORE: 1675.66

## 2019-08-05 NOTE — PATIENT INSTRUCTIONS
Reduce your metformin to 500mg once a day for the next few days until your new supply of extended release metformin arrives.    When the extended release Metformin arrives, take the extended release metformin 500 mg twice daily with meals.    If your loose stools have not improved within about 1 week of this change please call    Continue to monitor your blood pressure once or twice per week and record on your glucose log.    Follow-up in October with Dr. Gutierrez on diabetes with a hemoglobin A1c

## 2019-08-09 NOTE — PROGRESS NOTES
History  86-year-old male with past history of hypertension and diabetes.  As far as his diabetes he was last seen 3 weeks ago and his A1c was 8.4% the.  He checks his glucose in the evenings with a majority of blood glucoses in the 160s up.  The range is 1 16-1 81 up.  He denies any hypoglycemic readings or symptoms of.  He has developed loose stools over the past 2 weeks.  He has had this problem before with regular release metformin.  It appears he has inadvertently restarted regular release metformin, possibly through calling in a refill from an old medication bottle.    As far as his hypertension he checks his blood pressure at home with blood pressures 132-150/69-81.  Denies any dizziness lightheadedness or orthostatic symptoms.  Blood pressure today is well controlled at 130/74    Review of systems:   No chest pain episodes  No respiratory symptoms  PHQ 2 is negative    Exam  Vitals are reviewed with blood pressure at goal  General: Alert in no distress  HEENT: Sclera nonicteric noninjected.  Moist mucous membranes.  Cardiovascular: Regular rate rhythm without murmur  Respiratory: Lungs clear to auscultation bilaterally  Abdomen soft nontender  Extremities are free of edema    Assessment and plan  1) type 2 diabetes: Will change back to extended release metformin which she has tolerated in the past without loose stools.  Recommend that he discontinue regular release metformin for a few days until his stools returned to normal and then resume extended release metformin 500 mg twice daily.  He will return for his next diabetic care visit in early October  He is due for his yearly eye exam in November  See note July 15, 2019 for details    2) hypertension: Well-controlled.  Continue lisinopril 20 mg daily.

## 2019-10-09 NOTE — LETTER
November 6, 2018      Elie Martinez  [address]   [place] [address]        Dear Elie,    Your kidney function is similar to one year ago.  I recommend you return for repeat kidney function test in 2 months     Please see below for your test results.    Resulted Orders   Basic Metabolic Panel ([facility]) - Results < 1 hr   Result Value Ref Range    Glucose 172.0 (H) 60.0 - 109.0 mg/dL    Urea Nitrogen 22.0 7.0 - 30.0 mg/dL    Creatinine 1.3 0.8 - 1.5 mg/dL    Sodium 146.0 (H) 133.0 - 144.0 mmol/L    Potassium 4.5 3.4 - 5.3 mmol/L    Chloride 111.0 (H) 94.0 - 109.0 mmol/L    Carbon Dioxide 25.0 20.0 - 32.0 mmol/L    Calcium 10.0 8.5 - 10.4 mg/dL    eGFR Calculated (Non Black Reference) 55.6 (L) >60.0 mL/min    eGFR Calculated (Black Reference) 67.3 >60.0 mL/min       If you have any questions, please call the clinic to make an appointment.    Sincerely,    Jhon Gutierrez MD  
[de-identified] : Chan is a 32  year old lady with a abdominal pain. The pain is not very characteristic. She has epigastric and right lower abdominal pain. \par \par 9/18:  She was in the [facility] ed where a non Contrast CT showed a large fecolith. We ordered a US and  CT.\par 10/9 : She has no pain now. US shows a Gallstones with normal CBD and gallbladder polyp of  0.4. CT scan shows a normal CT with gallstones. She also has umbilical hernia.

## 2019-10-21 ENCOUNTER — TELEPHONE (OUTPATIENT)
Dept: FAMILY MEDICINE | Facility: CLINIC | Age: 84
End: 2019-10-21

## 2019-10-21 NOTE — TELEPHONE ENCOUNTER
Called patient per 's request for triaging of symptoms. Patient c/o left arm numbness and weakness. No other extremities affected. Denies any other symptoms; no SOB, headache, dizziness, slurred speech, drooping. Numbness began this morning and has not resolved, denies pain. Given patient's history of cerebral vascular disease, and sudden onset of symptoms, advised to be seen in ED rather than late afternoon appointment in clinic. Patient verbalized understanding and stated he will go to the emergency room. Sher PARNELL

## 2019-10-24 ENCOUNTER — TELEPHONE (OUTPATIENT)
Dept: FAMILY MEDICINE | Facility: CLINIC | Age: 84
End: 2019-10-24

## 2019-10-24 NOTE — TELEPHONE ENCOUNTER
Date of discharge: 10/23/2019  Facility of discharge: [facility]  Patient concerns about condition: No concerns at this time.  Patient concerns about medications: No concerns at this time.  Full med reconciliation will be completed at clinic visit.  Patient concerns about transitioning: No concerns at this time.  Clinic office visit appointment date: 11/18/2019  Patient reminded to bring all medications (prescription and over-the-counter) to clinic appointment: Yes    Using the date of discharge as day 1, the 30th day post discharge is 11/23/2019.

## 2019-11-18 ENCOUNTER — OFFICE VISIT (OUTPATIENT)
Dept: FAMILY MEDICINE | Facility: CLINIC | Age: 84
End: 2019-11-18
Payer: MEDICARE

## 2019-11-18 VITALS
HEART RATE: 85 BPM | DIASTOLIC BLOOD PRESSURE: 76 MMHG | TEMPERATURE: 97.6 F | BODY MASS INDEX: 28.91 KG/M2 | HEIGHT: 72 IN | RESPIRATION RATE: 16 BRPM | OXYGEN SATURATION: 98 % | WEIGHT: 213.4 LBS | SYSTOLIC BLOOD PRESSURE: 120 MMHG

## 2019-11-18 DIAGNOSIS — N18.2 CHRONIC RENAL DISEASE, STAGE II: ICD-10-CM

## 2019-11-18 DIAGNOSIS — Z79.4 TYPE 2 DIABETES MELLITUS WITH HYPERGLYCEMIA, WITH LONG-TERM CURRENT USE OF INSULIN (H): Primary | ICD-10-CM

## 2019-11-18 DIAGNOSIS — I10 ESSENTIAL HYPERTENSION: ICD-10-CM

## 2019-11-18 DIAGNOSIS — E11.65 TYPE 2 DIABETES MELLITUS WITH HYPERGLYCEMIA, WITH LONG-TERM CURRENT USE OF INSULIN (H): Primary | ICD-10-CM

## 2019-11-18 DIAGNOSIS — R41.3 MEMORY CHANGES: ICD-10-CM

## 2019-11-18 LAB
TSH SERPL DL<=0.05 MIU/L-ACNC: 5.59 UIU/ML (ref 0.3–5)
VIT B12 SERPL-MCNC: 586 PG/ML (ref 213–816)

## 2019-11-18 ASSESSMENT — MIFFLIN-ST. JEOR: SCORE: 1680.98

## 2019-11-18 NOTE — PATIENT INSTRUCTIONS
Check blood sugar when fasting in the morning, alternating with after you eat    Bring in your glucose log next visit    We will see if a new diabetic medication, possibly one you have tried before, would be a good alternative    F/U with Dr. Gutierrez in about one month for diabetes and memory evaluation

## 2019-11-25 ENCOUNTER — TELEPHONE (OUTPATIENT)
Dept: FAMILY MEDICINE | Facility: CLINIC | Age: 84
End: 2019-11-25

## 2019-11-25 NOTE — TELEPHONE ENCOUNTER
TCM APPOINTMENT FOLLOW UP    Language:English    Medication questions: no    Specialist follow up: no    Concerns since last visit: no    Next appointment at [facility]:Yes    Comments: Pt stated he is doing fine.      @[facility]@ Yes   PARAG Mares

## 2019-11-25 NOTE — PROGRESS NOTES
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    He was hospitalized October 21 for left elbow olecranon bursitis.  There is initial concern that this could represent a septic joint or bursitis.  He had initial treatment with vancomycin and nafcillin and his elbow was tapped.  No organisms or crystals were identified.  Antibiotics were discontinued prior to discharge.  At home he simply Ace wrap the elbow, and all the swelling and symptoms resolved within 2 weeks.  He has had no recurrence of swelling, redness, or pain.    Type 2 diabetes: Last outpatient A1c was 8.4% in August 2019; A1c was 9.9% during his hospitalization on 10/21/2019.  Home glucose readings range from 190-220, and are all after eating.  He is using 40 units of Lantus each evening, metformin extended release 500 mg once daily (he is unable to tolerate higher doses due to loose stools) he.  In the haspast he used NovoLog meal insulin, and prior to that glipizide which was effective at controlling glucoses but concerns about the hypoglycemia risk.  His A1c decreased from greater than 11%, down to 7.4% during 2018 with the addition of NovoLog 5 units 3 times daily with meals.  NovoLog was discontinued on December 10, 2018 in an effort to reduce cost.  His home glucoses and A1c were well controlled 5 months later at 8.0% in April 2019, and climbed to 8.4% in July 2019.  He elected to try diet modification at that time and reevaluate here in October.  He is walking daily around his building for exercise.      Stage 2 to 3a CKD:  His GFR in January 2019 was 61, in January 2018 = 61, on 10/22/2019 during his hospitalization GFR was  53.    Hypertension: Continues on lisinopril 20 mg daily. No dizziness, lightheadedness, or orthostatic symptoms.    Memory concerns: He is here with his daughter today who expresses some concerns about his memory.  She describes that he lives independently, and does not require much help, but seems to more often loses train of thought, or forget names.  His daughter helps with some financial planning, but feels he is otherwise independent.  The patient feels he is doing fine and is not bothered with some occasional memory lapses.    Review of systems:  General: Weight and appetite are stable  HEENT: No visual changes.  No recent upper respiratory symptoms  Cardiovascular: No chest pain, palpitations  Respiratory: No cough, or shortness of breath  GI: No recent problems with loose stools or constipation  Skin: No skin ulcerations or wounds musculoskeletal: No elbow pain    Exam  Vitals are reviewed with repeat blood pressure at goal  General: Alert in no distress.  Relates his history including medications with some prompting for medication names  HEENT: Head is normocephalic.  Sclera nonicteric.  Moist mucous membranes.  No tonsillar hypertrophy.  Cardiovascular: Heart tones are quiet and regular  Respiratory: Clear with fair air exchange bilaterally  Abdomen: Soft nontender  Extremities: Free of edema  Neurologic: He is alert and oriented.  He has appropriate responses to questions and provides a moderately detailed history.  He recalls  1 of 3 items after 3 minutes of distraction.  His clock draw test is normal.    Assessment and plan  1) Type 2 diabetes: A1c has gradually climbed over the past year since discontinuing NovoLog meal insulin (previously dose to 5 units with meals).  We discussed various options to reduce blood glucose.  Attempts at increasing his physical activity and dietary changes have not improved glucoses to date this year.  Due to the inconvenience and potential hypoglycemic risk of meal  insulin, patient is interested in trying an alternative if affordable.  Trial of empagliflozin 10 mg once daily.  We discussed common side effects effects such as GI upset, and more rare but potential side effects such as  infection including yeast infections and more severe bacterial infections, and the rare risk of allergic reaction.  If the medication is not affordable, he may consider returning to a fixed dose meal insulin regimen with initial oversight from his daughter to ensure adherence and safety.  Follow-up in 1 month with a glucose log, call earlier if he is not able to initiate empagliflozin due to cost  Repeat A1c late January 2020  Urine microalbumin April 2020  Discontinue empagliflozin if GFR drops below 45    2) Left olecranon bursitis: Resolved.  Return if recurrent symptoms    3) Memory concerns: TSH and B12 are normal today.  Discussed brain imaging, particularly in light of his past cerebrovascular history, as well as potential medication options such as Aricept and Namenda.  At this point patient and the daughter consents to this basic laboratory evaluation, but defer brain imaging and prefer to discuss at future visits, well taking more notice of executive functions at home.    4) Hypertension: Well-controlled.  Continue lisinopril 20 mg daily.  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

## 2019-12-16 ENCOUNTER — OFFICE VISIT (OUTPATIENT)
Dept: FAMILY MEDICINE | Facility: CLINIC | Age: 84
End: 2019-12-16
Payer: MEDICARE

## 2019-12-16 VITALS
OXYGEN SATURATION: 95 % | RESPIRATION RATE: 22 BRPM | WEIGHT: 215 LBS | HEIGHT: 72 IN | SYSTOLIC BLOOD PRESSURE: 134 MMHG | BODY MASS INDEX: 29.12 KG/M2 | HEART RATE: 80 BPM | DIASTOLIC BLOOD PRESSURE: 73 MMHG | TEMPERATURE: 97.5 F

## 2019-12-16 DIAGNOSIS — E11.65 TYPE 2 DIABETES MELLITUS WITH HYPERGLYCEMIA, WITH LONG-TERM CURRENT USE OF INSULIN (H): Primary | ICD-10-CM

## 2019-12-16 DIAGNOSIS — Z79.4 TYPE 2 DIABETES MELLITUS WITH HYPERGLYCEMIA, WITH LONG-TERM CURRENT USE OF INSULIN (H): Primary | ICD-10-CM

## 2019-12-16 DIAGNOSIS — I10 ESSENTIAL HYPERTENSION: ICD-10-CM

## 2019-12-16 DIAGNOSIS — E11.65 TYPE 2 DIABETES MELLITUS WITH HYPERGLYCEMIA, WITHOUT LONG-TERM CURRENT USE OF INSULIN (H): ICD-10-CM

## 2019-12-16 RX ORDER — METFORMIN HCL 500 MG
500 TABLET, EXTENDED RELEASE 24 HR ORAL
Qty: 90 TABLET | Refills: 3
Start: 2019-12-16 | End: 2020-07-30

## 2019-12-16 ASSESSMENT — MIFFLIN-ST. JEOR: SCORE: 1695.23

## 2019-12-16 NOTE — PATIENT INSTRUCTIONS
Re-start NOVOLOG meal insulin 5 units three times daily before meals.  Check you blood glucose before each meal and record  Call Dr. Gutierrez's office with your glucose readings each week on Mondays for 3 weeks    Schedule an appt  with Dr. Gutierrez for late January for Diabetes check up and kidney function test    Referral for :     Ophthalmology    LOCATION/PLACE/Provider :    [facility]   DATE & TIME :    March 17th at 9:30 am  PHONE :     [phone]  FAX :    [phone]  Appointment made by clinic staff/:    Cherie

## 2019-12-16 NOTE — NURSING NOTE
"Chief Complaint   Patient presents with     Diabetes     Medication Reconciliation     completed.        BP (!) 150/73   Pulse 80   Temp 97.5  F (36.4  C) (Oral)   Resp 22   Ht 1.84 m (6' 0.44\")   Wt 97.5 kg (215 lb)   SpO2 95%   BMI 28.81 kg/m        Due for foot exam, wellness exam  Advanced directive given to patient and daughter.   ~ Bobby ORTIZ (Chrissi) CMA [facility]  [phone]    "

## 2019-12-22 NOTE — PROGRESS NOTES
History  87-year-old male with a history significant for type 2 diabetes, cerebrovascular disease, hypertension, stage II chronic kidney disease who presents for management of diabetes and hypertension.    Type 2 diabetes: Morning glucoses fasting range from 170-207.  Evening glucoses 191-259.  He has been using Lantus 40 units nightly and does not miss any doses.  We discussed the starting Jardiance at his last visit, but he did not get this medication filled due to cost.  Back in 2018 his glucoses are well controlled with NovoLog 5 units with meals.  He is interested in returning to this regimen.    His last A1c was 9.9% in November during an inpatient stay for olecranon bursitis.    He did not have past problems with hypoglycemia, injection reactions, or other problems with insulin management in the past.    Hypertension: He continues on lisinopril 20 mg a day.  No dizziness lightheadedness or orthostatic symptoms.  At his last visit his blood pressure was 120/76 in November 2019.  Back in June blood pressure 130/74.  He has a home blood pressure cuff but did not bring his readings in with him today.  Thinks his blood pressures have been in the 130s to 140s systolic.    Review of systems  No recent illness.  No fevers or chills  HEENT: Some gradual decline in hearing over the years.  No vision changes.  No recent upper respiratory symptoms  Respiratory: No cough, shortness of breath  Cardiovascular: No chest pain, palpitations, leg swelling  GI: No recent problems with loose stools.  He has had problems with loose stools if he uses more than 500 mg of metformin per day  Skin: No recent rash   psych: Denies any depressive symptoms.  PHQ 2 is negative.  His daughter worries that he occasionally forgets things.  He manages his own home, his own finances, and his own medications.  HEENT:    Remainder review of systems as as above  Exam  Vitals are reviewed with  repeat blood pressure at goal  General: Alert no  distress.  Relates his own history.  Eyes: Sclera nonicteric noninjected.  Moist mucous membranes.  The neck is free of adenopathy.  Cardiovascular: Regular rate rhythm without murmur  Respiratory: Clear bilaterally  Abdomen soft nontender  Extremities are free of edema  Neurologic: He recalls 3 of 3 items after 2 minutes of distraction.  Spells world backwards.  Alert and oriented.  Recalls his medication list and doses.    Assessment and plan  Uncontrolled type 2 diabetes: He would like to return to meal insulin and basal insulin, as this was effective in the past and was well covered by his insurance.  Continue Lantus 40 units each evening  Start NovoLog 5 units with each meal.  He previously use meal insulin and understands how to treat hypoglycemia, and recheck glucose  He will check blood glucoses before each meal and nightly he will call glucose readings  Each Monday for the next 3 weeks  He will return to clinic in late January for diabetes checkup as well as basic metabolic profile    I discussed with both the patient and the patient's daughter the risk of serious adverse reactions with insulin including hypoglycemia.  We discussed in particular that if there are memory concerns, this could increase the risk.  Brief memory testing today shows intact memory, and he is independent managing the rest of his medications and finances at this time.  I suggested he written glucose log, should use a medication check list to ensure that he does not accidentally take multiple doses of insulin;  and his daughter offers to double check medication usage weekly.    No wheels hypertension: Blood pressure well controlled.  Base metabolic profile at his next visit in late January    Stage II-IIIa chronic kidney disease: Plan for basic metabolic profile at his next visit in late January  GFR in January 2019 was 61, GFR 10/22/2019 equals 53

## 2020-01-08 ENCOUNTER — MYC REFILL (OUTPATIENT)
Dept: FAMILY MEDICINE | Facility: CLINIC | Age: 85
End: 2020-01-08

## 2020-01-08 DIAGNOSIS — E11.65 TYPE 2 DIABETES MELLITUS WITH HYPERGLYCEMIA, WITH LONG-TERM CURRENT USE OF INSULIN (H): ICD-10-CM

## 2020-01-08 DIAGNOSIS — Z79.4 TYPE 2 DIABETES MELLITUS WITH HYPERGLYCEMIA, WITH LONG-TERM CURRENT USE OF INSULIN (H): ICD-10-CM

## 2020-01-20 ENCOUNTER — OFFICE VISIT (OUTPATIENT)
Dept: FAMILY MEDICINE | Facility: CLINIC | Age: 85
End: 2020-01-20
Payer: MEDICARE

## 2020-01-20 VITALS
TEMPERATURE: 97.5 F | WEIGHT: 219 LBS | HEART RATE: 86 BPM | SYSTOLIC BLOOD PRESSURE: 135 MMHG | OXYGEN SATURATION: 98 % | RESPIRATION RATE: 20 BRPM | BODY MASS INDEX: 29.66 KG/M2 | DIASTOLIC BLOOD PRESSURE: 78 MMHG | HEIGHT: 72 IN

## 2020-01-20 DIAGNOSIS — I10 ESSENTIAL HYPERTENSION: ICD-10-CM

## 2020-01-20 DIAGNOSIS — Z79.4 TYPE 2 DIABETES MELLITUS WITH HYPERGLYCEMIA, WITH LONG-TERM CURRENT USE OF INSULIN (H): Primary | ICD-10-CM

## 2020-01-20 DIAGNOSIS — N18.31 CHRONIC KIDNEY DISEASE, STAGE 3A (H): ICD-10-CM

## 2020-01-20 DIAGNOSIS — E11.65 TYPE 2 DIABETES MELLITUS WITH HYPERGLYCEMIA, WITH LONG-TERM CURRENT USE OF INSULIN (H): Primary | ICD-10-CM

## 2020-01-20 LAB
BUN SERPL-MCNC: 26 MG/DL (ref 7–30)
CALCIUM SERPL-MCNC: 10.2 MG/DL (ref 8.5–10.4)
CHLORIDE SERPLBLD-SCNC: 106 MMOL/L (ref 94–109)
CO2 SERPL-SCNC: 23 MMOL/L (ref 20–32)
CREAT SERPL-MCNC: 1.4 MG/DL (ref 0.8–1.5)
EGFR CALCULATED (BLACK REFERENCE): 61.7 ML/MIN
EGFR CALCULATED (NON BLACK REFERENCE): 51 ML/MIN
GLUCOSE SERPL-MCNC: 210 MG/DL (ref 60–109)
POTASSIUM SERPL-SCNC: 4.5 MMOL/L (ref 3.4–5.3)
SODIUM SERPL-SCNC: 145 MMOL/L (ref 133–144)

## 2020-01-20 RX ORDER — BLOOD-GLUCOSE METER
KIT MISCELLANEOUS
Qty: 1 KIT | Refills: 0 | Status: SHIPPED | OUTPATIENT
Start: 2020-01-20 | End: 2022-03-01

## 2020-01-20 ASSESSMENT — MIFFLIN-ST. JEOR: SCORE: 1710.25

## 2020-01-20 NOTE — LETTER
January 21, 2020      Elie Martinez  [address]   [place] [address]        Dear Elie,    Your kidney function is below the normal range, but stable compared to one year ago.  We will repeat this test in 6 months.     Please see below for your test results.    Resulted Orders   Basic Metabolic Panel ([facility]) - Results < 1 hr   Result Value Ref Range    Glucose 210.0 (H) 60.0 - 109.0 mg/dL    Urea Nitrogen 26.0 7.0 - 30.0 mg/dL    Creatinine 1.4 0.8 - 1.5 mg/dL    Sodium 145.0 (H) 133.0 - 144.0 mmol/L    Potassium 4.5 3.4 - 5.3 mmol/L    Chloride 106.0 94.0 - 109.0 mmol/L    Carbon Dioxide 23.0 20.0 - 32.0 mmol/L    Calcium 10.2 8.5 - 10.4 mg/dL    eGFR Calculated (Non Black Reference) 51.0 (L) >60.0 mL/min    eGFR Calculated (Black Reference) 61.7 >60.0 mL/min       If you have any questions, please call the clinic to make an appointment.    Sincerely,    Jhon Gutierrez MD

## 2020-01-20 NOTE — PATIENT INSTRUCTIONS
For diabetes:  Continue your current Metformin 500mg daily and your current insulin doses    Continue to check blood sugar when you wake up before you eat, before you eat/take meal insulin, and before bed    Call Dr. Gutierrez with your blood sugar values in 2 weeks so that dose adjustments can be made if needed    Goal glucose levels:  When fasting first thing in the morning, less than 160  Before meals, less than 200      A kidney function test was performed today      Return to see Dr. Gutierrez in about one month for a diabetes check    We will plan a hemoglobin A1c test in March

## 2020-01-20 NOTE — NURSING NOTE
"Chief Complaint   Patient presents with     Diabetes     follow up. Needs new meter- broke.      Follow Up     B12     Medication Reconciliation     completed.        BP (!) 143/82   Pulse 86   Temp 97.5  F (36.4  C) (Oral)   Resp 20   Ht 1.835 m (6' 0.24\")   Wt 99.3 kg (219 lb)   SpO2 98%   BMI 29.50 kg/m        ~ Bobby ORTIZ (Chrissi) CMA [facility]  [phone]    "

## 2020-01-21 NOTE — RESULT ENCOUNTER NOTE
Your kidney function is below the normal range, but stable compared to one year ago.  We will repeat this test in 6 months.

## 2020-01-31 NOTE — PROGRESS NOTES
"History  87-year-old male with a history of type 2 diabetes, stage IIIa chronic kidney disease, hypertension, cerebrovascular disease presents for management of chronic disease.    Type 2 diabetes: At his last visit in mid December patient elected to restart meal insulin in response to high postprandial glucoses in the 190-260 range, and an A1c of 9.9% in November.  He investigated the cost of alternative medication such as Jardiance but found them to be cost prohibitive, and back in 2018 he took NovoLog 5 units with meals and had good glucose control.  He did not start NovoLog meal insulin until January 10, 2020.  He has noticed a decrease in his evening blood glucoses since adding meal insulin.  His preprandial evening glucoses have decreased from typically 190s to the 260s, down to 160-181.  Fasting glucoses have only decreased slightly typically 180-190.  No hypoglycemic symptoms or readings less than 160.  He continues on Lantus 40 units each evening.  Metformin 500 mg once daily, higher doses have been associated with loose stool.  No recent changes in vision.  No skin wounds.  No chest pain episodes.    Hypertension: He checks his blood pressure at home about 3 times a week.  Readings at home range from 120-143/73-82, with only one reading above 140 systolic and one reading over 76 diastolic.  No dizziness, lightheadedness or orthostatic symptoms    Memory concerns: He notices he forgets names more often now than he did 10 years ago.  He is not bothered by this memory issue.  He is here with his son today who is noticed that he occasionally forgets names, but seems to be functioning \"fine\".  His son spends time with him most weekends, sharing a meal or watching football games on Sundays.  His daughter attended the visit in November who felt he sometimes loses train of thought and forgets names.  She helps occasionally with financial planning, but not day-to-day financial transactions.  He uses a pillbox for " medications.  He is otherwise completely independent with his living, meal preparation, medications, glucose monitoring.    Review of systems  General: No recent illness  HEENT: No visual changes.  Chronic decreased hearing unchanged  Cardiovascular: No chest pain episodes, palpitations, shortness of breath  Respiratory: No respiratory symptoms  GI: Previously had loose stools with higher metformin doses.  Stools have been normal.  Skin: No recurrence of the redness swelling or pain of his olecranon  Psych: PHQ 2 negative    Exam  Vitals are reviewed with repeat blood pressure at goal  General: Alert and in no distress  HEENT: Sclera nonicteric noninjected.  Moist mucous membranes.  Neck is free of adenopathy.  Cardiovascular: Heart tones are quiet, regular  Respiratory: Clear with fair air exchange bilaterally  Abdomen: Soft and nontender  Extremities: No peripheral edema  Neurologic: He is alert and oriented.  He responds appropriately to questions and provides details of recent glucose and blood pressure monitoring with the assistance of his log.  He accurately solves a riddle.  He accurately performs a clock draw test.  He recalls 2 of 3 items after approximately 2 minutes of distraction.  He accurately describes how to evaluate and treat the feeling of low blood sugar.    Assessment and plan    Type 2 diabetes: Patient/daughter report cost of SGLT2 (empagliflozin) very high compared to Novolog; had trouble with prescription coverage for Onglyza back in 2016; for that reason patient elected resuming NovoLog with meals which worked well for him back in 2018.   He resumed NovoLog 5 units with meals 3 times daily on January 10, 2020.   He has control of his preprandial glucoses without any glucoses under 160.  Continue Lantus 40 units nightly  NovoLog 5 units 3 times daily with meals  Metformin 500 mg once daily (dose limited by loose stool)  Advised checking blood glucose before each meal, before bed, and before  driving.  Goal fasting glucose 100-180  Goal preprandial <200    A1c 9.9% in November, goal 8.0-8.5%    Follow up with glucometer and log in one month    Will revisit the insurance coverage possibilities of SGLT-2 or GLP-1 medications which may be preferable in light of patients CKD, and due to ease of use    Repeat A1c in March 2020.    Hypertension: Well controlled. BMP today with stable GFR 51.    Stage 3a CKD: GFR 51.  Plan for CBC, PTH, Mg Phos with next blood draw    Memory Concerns: likely mild cognitive decline of aging. Stable mild memory lapses for past 3 years.  Intact functional status.  Connected with family.    B12, TSH, metabolic profile normal last visit.   No depression symptoms.  No significant alcohol use.  Son/daughter will monitor medications and insulin use/SMBG log  Family will continue to monitor function closely; consider formal cognitive assessment  Repeat MMSE next visit    Cerebral Vascular disease: continue statin and aspirin    Jhon Gutierrez MD

## 2020-02-05 DIAGNOSIS — Z79.4 TYPE 2 DIABETES MELLITUS WITH HYPERGLYCEMIA, WITH LONG-TERM CURRENT USE OF INSULIN (H): ICD-10-CM

## 2020-02-05 DIAGNOSIS — E11.65 TYPE 2 DIABETES MELLITUS WITH HYPERGLYCEMIA, WITH LONG-TERM CURRENT USE OF INSULIN (H): ICD-10-CM

## 2020-02-05 NOTE — TELEPHONE ENCOUNTER
Patient calling to check if Rx has been sent, he states he went to the pharmacy and there was nothing sent. Told him it takes 48 hours for refills and let him know that Dr. Gutierrez is not in today. He states he is out of the medication. Please call and advise.

## 2020-02-05 NOTE — TELEPHONE ENCOUNTER
[facility] phone call message- patient requesting a refill:    Full Medication Name: free style lite testing strip    Dose:     Pharmacy confirmed as   [id number] - [place] - [address] AT [facility] [address] [place] [id number] [facility] [address]  Phone: [phone] Fax: [phone]    [facility]  for [facility] - [place], [place] - [address] [place] [address]  Phone: [phone] Fax: [phone]  : No: [facility][id number] - [place], [place] - [address] AT [facility] [address] [place] [address] [facility] [address]  Phone: [phone] Fax: [phone]    Additional Comments:Patient state he is fully out of testing strips. Wants to get refill done soon.     OK to leave a message on voice mail? Yes    Primary language: English      needed? No    Call taken on February 5, 2020 at 9:25 AM by Lobito Barney

## 2020-02-10 DIAGNOSIS — E11.65 TYPE 2 DIABETES MELLITUS WITH HYPERGLYCEMIA, WITH LONG-TERM CURRENT USE OF INSULIN (H): ICD-10-CM

## 2020-02-10 DIAGNOSIS — Z79.4 TYPE 2 DIABETES MELLITUS WITH HYPERGLYCEMIA, WITH LONG-TERM CURRENT USE OF INSULIN (H): ICD-10-CM

## 2020-02-10 NOTE — TELEPHONE ENCOUNTER
[facility] phone call message- patient requesting a refill:    Full Medication Name: blood glucose (FREESTYLE TEST STRIPS) test strip    Dose:     Pharmacy confirmed as   [facility][id number] - [place], [place] - [address]  AT [facility] [address]  [address] [address]  Phone: [phone] Fax: [phone]    [facility]  for [facility] - [place], [place] - [address] [place] [address]  Phone: [phone] Fax: [phone]  : No:       [facility] to [facility]     Additional Comments: Patient is requesting a refill, notified it may take 2 days for a response. Please call and advise.      OK to leave a message on voice mail? Yes    Primary language: English      needed? No    Call taken on February 10, 2020 at 8:26 AM by Nelia Kenney

## 2020-03-02 ENCOUNTER — HEALTH MAINTENANCE LETTER (OUTPATIENT)
Age: 85
End: 2020-03-02

## 2020-03-30 DIAGNOSIS — I67.9 CEREBRAL VASCULAR DISEASE: ICD-10-CM

## 2020-03-30 RX ORDER — ATORVASTATIN CALCIUM 40 MG/1
40 TABLET, FILM COATED ORAL DAILY
Qty: 90 TABLET | Refills: 3 | Status: SHIPPED | OUTPATIENT
Start: 2020-03-30 | End: 2021-02-15

## 2020-04-17 DIAGNOSIS — I10 ESSENTIAL HYPERTENSION: ICD-10-CM

## 2020-04-17 DIAGNOSIS — E11.65 TYPE 2 DIABETES MELLITUS WITH HYPERGLYCEMIA, WITH LONG-TERM CURRENT USE OF INSULIN (H): ICD-10-CM

## 2020-04-17 DIAGNOSIS — Z79.4 TYPE 2 DIABETES MELLITUS WITH HYPERGLYCEMIA, WITH LONG-TERM CURRENT USE OF INSULIN (H): ICD-10-CM

## 2020-04-17 RX ORDER — BLOOD SUGAR DIAGNOSTIC
STRIP MISCELLANEOUS
Qty: 300 STRIP | Refills: 11 | Status: SHIPPED | OUTPATIENT
Start: 2020-04-17 | End: 2021-03-01

## 2020-04-17 NOTE — TELEPHONE ENCOUNTER
[facility] phone call message- patient requesting a refill:    Full Medication Name: blood glucose (FREESTYLE TEST STRIPS    Dose:     Pharmacy confirmed as   [facility][id number] - [place], [place] - [address] AT [facility] [address]  [address] [place] [address]  Phone: [phone] Fax: [phone]    [facility]  for [facility] - [place], [place] - [address] [place] [address]  Phone: [phone] Fax: [phone]  : No: SEND TO [facility] #[id number]    Additional Comments:      OK to leave a message on voice mail? Yes    Primary language: English      needed? No    Call taken on April 17, 2020 at 9:19 AM by Yoli Salamanca

## 2020-04-20 RX ORDER — LISINOPRIL 20 MG/1
20 TABLET ORAL DAILY
Qty: 90 TABLET | Refills: 1 | Status: SHIPPED | OUTPATIENT
Start: 2020-04-20 | End: 2020-10-19

## 2020-04-29 ENCOUNTER — TELEPHONE (OUTPATIENT)
Dept: FAMILY MEDICINE | Facility: CLINIC | Age: 85
End: 2020-04-29

## 2020-04-29 NOTE — TELEPHONE ENCOUNTER
[facility] phone call message- medication clarification/question:    Full Medication Name: blood glucose (FREESTYLE TEST STRIPS) test strip        Question: Patient states [facility] does not have rx listed above. He states he needs this rx sent to [facility] so that it can be delivered to him. Notified it may take 2 buisness days for response. Please call and advise.     Pharmacy confirmed as    [facility]  FOR [address]: Yes    OK to leave a message on voice mail? Yes    Primary language: English      needed? No    Call taken on April 29, 2020 at 1:43 PM by Nelia Kenney

## 2020-04-29 NOTE — LETTER
July 28, 2020      Elie Martinez  [address]   [place] [address]        Dear ,    Your eye doctor sent me a letter letting me know that you have important eye conditions which need to be monitored regularly.  It is important that you return to see Dr. Gutierrez for management of your diabetes which is important to your eye health.     Resulted Orders   Eye Exam - HIM Scan   Result Value Ref Range    RETINOPATHY         If you have any questions or concerns, please call the clinic at the number listed above.       Sincerely,        Jhon Gutierrez MD

## 2020-04-29 NOTE — TELEPHONE ENCOUNTER
Per Dr Gutierrez's authorization of new rx fill for Freestyle test strips sent to [facility], I called [facility] and phoned in rx for  Freestyle Lite test strips  #300 to use three times a day or as directed with 11 refills. Verified this information to ensure correct test strips for glucometer he has. Elie confirms also with validation from [facility]- their last dispense of strips were also for Freestyle Lite. No further action needed at this time. Denis PARNELL

## 2020-07-14 LAB — RETINOPATHY: NORMAL

## 2020-07-27 LAB — RETINOPATHY: NORMAL

## 2020-07-27 NOTE — RESULT ENCOUNTER NOTE
Please send result letter    Your eye doctor sent me a letter letting me know that you have important eye conditions which need to be monitored regularly.  It is important that you return to see Dr. Gutierrez for management of your diabetes which is important to your eye health

## 2020-07-30 DIAGNOSIS — Z79.4 TYPE 2 DIABETES MELLITUS WITH HYPERGLYCEMIA, WITH LONG-TERM CURRENT USE OF INSULIN (H): ICD-10-CM

## 2020-07-30 DIAGNOSIS — E11.65 TYPE 2 DIABETES MELLITUS WITH HYPERGLYCEMIA, WITH LONG-TERM CURRENT USE OF INSULIN (H): ICD-10-CM

## 2020-07-31 RX ORDER — METFORMIN HCL 500 MG
500 TABLET, EXTENDED RELEASE 24 HR ORAL
Qty: 90 TABLET | Refills: 0 | Status: SHIPPED | OUTPATIENT
Start: 2020-07-31 | End: 2021-09-16

## 2020-08-20 DIAGNOSIS — E11.65 TYPE 2 DIABETES MELLITUS WITH HYPERGLYCEMIA, WITHOUT LONG-TERM CURRENT USE OF INSULIN (H): ICD-10-CM

## 2020-08-20 DIAGNOSIS — Z79.4 TYPE 2 DIABETES MELLITUS WITH HYPERGLYCEMIA, WITH LONG-TERM CURRENT USE OF INSULIN (H): ICD-10-CM

## 2020-08-20 DIAGNOSIS — E11.65 TYPE 2 DIABETES MELLITUS WITH HYPERGLYCEMIA, WITH LONG-TERM CURRENT USE OF INSULIN (H): ICD-10-CM

## 2020-08-20 NOTE — TELEPHONE ENCOUNTER
[facility] phone call message- patient requesting a refill:    Full Medication Name: insulin glargine (LANTUS SOLOSTAR PEN) 100 UNIT/ML pen       Dose:     Pharmacy confirmed as   [facility][id number] - [place], [place] - [address] AT [facility] [address]  [address] [facility]  [place] [address]  Phone: [phone] Fax: [phone]    [facility] - [place], [place] - [address] [place] [address]  Phone: [phone] Fax: [phone]    [facility] - [place], [place] - [address] [place] [address]  Phone: [phone] Fax: [phone]  : Yes    Additional Comments: [facility]- patient state he is running out and needs refill     OK to leave a message on voice mail? Yes    Primary language: English      needed? No    Call taken on August 20, 2020 at 12:39 PM by Lobito Barney

## 2020-08-20 NOTE — TELEPHONE ENCOUNTER
Patient is calling back, wanting to add the blood glucose (FREESTYLE TEST STRIPS) test strip as well to the refill request. He states he is almost out of them. Please call and advise.

## 2020-09-21 ENCOUNTER — TRANSFERRED RECORDS (OUTPATIENT)
Dept: HEALTH INFORMATION MANAGEMENT | Facility: CLINIC | Age: 85
End: 2020-09-21

## 2020-10-19 DIAGNOSIS — N18.2 TYPE 2 DIABETES MELLITUS WITH STAGE 2 CHRONIC KIDNEY DISEASE, WITH LONG-TERM CURRENT USE OF INSULIN (H): ICD-10-CM

## 2020-10-19 DIAGNOSIS — I10 ESSENTIAL HYPERTENSION: ICD-10-CM

## 2020-10-19 DIAGNOSIS — Z79.4 TYPE 2 DIABETES MELLITUS WITH STAGE 2 CHRONIC KIDNEY DISEASE, WITH LONG-TERM CURRENT USE OF INSULIN (H): ICD-10-CM

## 2020-10-19 DIAGNOSIS — E11.22 TYPE 2 DIABETES MELLITUS WITH STAGE 2 CHRONIC KIDNEY DISEASE, WITH LONG-TERM CURRENT USE OF INSULIN (H): ICD-10-CM

## 2020-10-19 RX ORDER — LISINOPRIL 20 MG/1
20 TABLET ORAL DAILY
Qty: 90 TABLET | Refills: 0 | Status: SHIPPED | OUTPATIENT
Start: 2020-10-19 | End: 2021-01-04

## 2020-11-02 ENCOUNTER — OFFICE VISIT (OUTPATIENT)
Dept: FAMILY MEDICINE | Facility: CLINIC | Age: 85
End: 2020-11-02
Payer: MEDICARE

## 2020-11-02 VITALS
BODY MASS INDEX: 28.58 KG/M2 | HEIGHT: 72 IN | RESPIRATION RATE: 18 BRPM | HEART RATE: 75 BPM | WEIGHT: 211 LBS | TEMPERATURE: 98.1 F | SYSTOLIC BLOOD PRESSURE: 117 MMHG | DIASTOLIC BLOOD PRESSURE: 70 MMHG | OXYGEN SATURATION: 95 %

## 2020-11-02 DIAGNOSIS — Z79.4 TYPE 2 DIABETES MELLITUS WITH HYPERGLYCEMIA, WITH LONG-TERM CURRENT USE OF INSULIN (H): Primary | ICD-10-CM

## 2020-11-02 DIAGNOSIS — E11.65 TYPE 2 DIABETES MELLITUS WITH HYPERGLYCEMIA, WITH LONG-TERM CURRENT USE OF INSULIN (H): Primary | ICD-10-CM

## 2020-11-02 LAB
ANION GAP SERPL CALCULATED.3IONS-SCNC: 9 MMOL/L (ref 5–18)
BUN SERPL-MCNC: 26 MG/DL (ref 8–28)
CALCIUM SERPL-MCNC: 9.3 MG/DL (ref 8.5–10.5)
CHLORIDE SERPL-SCNC: 107 MMOL/L (ref 98–107)
CHOLEST SERPL-MCNC: 130 MG/DL
CO2 SERPL-SCNC: 23 MMOL/L (ref 22–31)
CREAT SERPL-MCNC: 1.37 MG/DL (ref 0.7–1.3)
CREAT UR-MCNC: 177.5 MG/DL
FASTING?: NO
GLUCOSE SERPL-MCNC: 197 MG/DL (ref 70–125)
HBA1C MFR BLD: 8.4 % (ref 4.1–5.7)
HDLC SERPL-MCNC: 32 MG/DL
LDLC SERPL CALC-MCNC: 65 MG/DL
MICROALBUMIN UR-MCNC: 5.33 MG/DL (ref 0–1.99)
MICROALBUMIN/CREAT UR: 30 MG/G
POTASSIUM SERPL-SCNC: 5 MMOL/L (ref 3.5–5)
SODIUM SERPL-SCNC: 139 MMOL/L (ref 136–145)
TRIGL SERPL-MCNC: 166 MG/DL

## 2020-11-02 PROCEDURE — 83036 HEMOGLOBIN GLYCOSYLATED A1C: CPT | Performed by: FAMILY MEDICINE

## 2020-11-02 PROCEDURE — 99214 OFFICE O/P EST MOD 30 MIN: CPT | Performed by: FAMILY MEDICINE

## 2020-11-02 PROCEDURE — 36415 COLL VENOUS BLD VENIPUNCTURE: CPT | Performed by: FAMILY MEDICINE

## 2020-11-02 ASSESSMENT — MIFFLIN-ST. JEOR: SCORE: 1665.09

## 2020-11-02 NOTE — PATIENT INSTRUCTIONS
Return to see Dr. Gutierrez in 3 months for follow up on your diabetes      Continue your current medications     Your kidney test, cholesterol and urine results will be posted to [facility] when available

## 2020-11-02 NOTE — PROGRESS NOTES
HPI:   Elie Martinez is a 88 year old  male who presents with his daugther for management of chronic disease:      DM 2:  Checks 2-3 times daily.  Glucoses typically 159-275.  Lowest glucose in past 3 months was 103.  No hypoglycemic readings or episodes.  .      He takes metformin 500 mg a day, is unable to take higher doses due to loose stools.  Taking Lantus 40 units each evening.  Novolog 5 units with two largest meals of the day, breakfast and dinner (does not use novlog with lunch).  No difficulty using insulin.  He is not interested in changing medications.  About a year ago he stopped meal insulin and had problems with high postprandial glucoses.  He also explored alternative medications and found them to be cost prohibitive.    Prepares own meals, except when gets take out with his children (once or twice per week)    A1c 9.9% on 10/22/2019  -->  8.4% today    Chronic kidney disease: No change in medications or urination.  He does not use nonsteroidals.    Cerebrovascular disease: No new neurologic symptoms.  He takes a daily aspirin without bleeding problems.    Hypertension:    Sees daughter once a week.  Two sons visit on Sundays, watch football.    HTN: no dizziness, lightheadedness or orthostatic symptoms.  Has a home cuff but did not bring any readings in today.  He estimates he checks once a week and thinks the top numbers usually in the 130s.    Memory concerns: He and his wife feel his memory has not changed over the past year.  He occasionally forgets names but is functioning well at home.  His daughter helps with financial organization but not his day-to-day financial transactions.  He uses a pillbox for his medications.  Tracks records his own glucoses, and brings in his logbook today.           PMHX:     Patient Active Problem List   Diagnosis     Long-term insulin use in type 2 diabetes (H)     Cerebral vascular disease     S/P carotid endarterectomy     Hypertension     Prostate cancer  (H)     Hyperlipidemia LDL goal <100     Chronic kidney disease, stage 3a     Chronic renal disease, stage II     Type 2 diabetes mellitus with hyperglycemia, with long-term current use of insulin (H)       Current Outpatient Medications   Medication Sig Dispense Refill     aspirin 325 MG tablet Take 1 tablet (325 mg) by mouth daily 90 tablet 3     atorvastatin (LIPITOR) 40 MG tablet Take 1 tablet (40 mg) by mouth daily 90 tablet 3     insulin aspart (NOVOLOG PEN) 100 UNIT/ML pen Inject 5 Units Subcutaneous 3 times daily (with meals) 6 mL 3     insulin glargine (LANTUS SOLOSTAR) 100 UNIT/ML pen Inject 40 Units Subcutaneous At Bedtime 9 mL 3     insulin pen needle (NOVOFINE) 32G X 6 MM miscellaneous Use with insulin pen four times daily or as directed 110 each 11     lisinopril (ZESTRIL) 20 MG tablet Take 1 tablet (20 mg) by mouth daily 90 tablet 0     metFORMIN (GLUCOPHAGE-XR) 500 MG 24 hr tablet Take 1 tablet (500 mg) by mouth daily with food 90 tablet 0     Multiple Vitamins-Minerals (ICAPS AREDS FORMULA) TABS Take 1 tablet by mouth 2 times daily       blood glucose (FREESTYLE TEST STRIPS) test strip Use to test blood sugar 3 times daily or as directed. 300 strip 11     blood glucose (NO BRAND SPECIFIED) lancets standard Use to test blood sugar 3 times daily or as directed. 100 each 11     blood glucose (NO BRAND SPECIFIED) lancets standard Use to test blood sugar 3 times daily or as directed. 300 each 3     blood glucose (NO BRAND SPECIFIED) test strip Use to test blood sugars 3 times daily or as directed 300 each 3     blood glucose monitoring (FREESTYLE FREEDOM LITE) meter device kit Use to test blood sugar 3 times daily or as directed. 1 kit 0     blood glucose monitoring (NO BRAND SPECIFIED) meter device kit Use to test blood sugar 3 times daily or as directed. 1 kit 0       Social History     Tobacco Use     Smoking status: Former Smoker     Types: Cigarettes     Quit date: 5/21/1994     Years since  quittin.4     Smokeless tobacco: Former User     Quit date: 1952   Substance Use Topics     Alcohol use: Yes     Alcohol/week: 0.0 standard drinks     Comment: 2 beers/week     Drug use: No       Social History     Social History Narrative     Not on file       Allergies   Allergen Reactions     No Known Allergies        No results found for this or any previous visit (from the past 24 hour(s)).         Review of Systems:     General: No recent illness.  No fevers or chills  ENT: No upper respiratory symptoms.  Chronic hearing loss  No changes in his vision  Cardiovascular: No chest pain episodes.  No dizziness lightheadedness or orthostatic symptoms  Respiratory: No cough or shortness of breath  GI: No recent stool changes  Skin: No recent skin rash or skin issues  Psych: Denies depressive symptoms  Endo: No significant weight change, or polydipsia           Physical Exam:     Vitals:    20 1526   BP: 117/70   BP Location: Right arm   Pulse: 75   Resp: 18   Temp: 98.1  F (36.7  C)   TempSrc: Oral   SpO2: 95%   Weight: 95.7 kg (211 lb)   Height: 1.829 m (6')     Body mass index is 28.62 kg/m .    General: Alert,  in no acute distress  HEENT: Head is free of trauma.   Sclerae non-icteric. PERRL, Moist oral mucus membranes, no tonsilar hypertrophy or exudate. TM's white with normal bony and light landmarks.  Neck: no adenopathy  Resp: Clear to auscultation bilaterally  CV: Regular rate and rhythm  Abd: Soft, non-tender.  Ext: Warm.    Skin: exposed skin free of rash  Psych: Mood appropriate   Neuro: He provides details of his recent blood glucoses, and verifies with his blood glucose log.  He answers questions appropriately.  He correctly describes what to do in the event of low blood sugar reading      Assessment and Plan   1. Type 2 diabetes mellitus with hyperglycemia, with long-term current use of insulin (H)     A1c today 8.4%  Goal A1c 8.5% or less    Goal fasting glucose 100 -180  Goal  preprandial less than 200      Patient and his daughter elected to continue metformin 500 mg a day (dose limited by loose stools at higher doses), Lantus and meal NovoLog.  Cost of other diabetic agents was too high, see my notes from January 2020    Future possibilities if cost becomes possible, include SGLT2 or GLP-1 medications which may be preferable in light of his chronic kidney disease and ease of use.    Continue to check blood glucose before each meal, and before bed.  He rarely drives, recommend he check blood glucose before driving.      - Hemoglobin A1c (P )  - Microalbumin Creatinine Ratio Random Ur ([facility])  - Basic Metabolic Profile ([facility])  - Lipid [place] ([facility])      Follow-up in 3 months, certainly no later than 6 months for repeat A1c and review of glucose log    Hypertension: Well-controlled  Patient metabolic profile today   Continue lisinopril  Repeat basic metabolic profile in 6 months    Stage IIIa chronic kidney disease: GFR today is stable at 49.  Continue to avoid nonsteroidal anti-inflammatory medications  Continue lisinopril, diabetic and blood pressure control  Repeat base metabolic profile in 6 months    Cerebral Vascular disease: continue secondary prevention with aspirin, statin, BP control    Follow up: 3 months for kidney function and diabetes  Options for treatment and follow-up care were reviewed with the patient and/or guardian. Elie Martinez and/or guardian engaged in the decision making process and verbalized understanding of the options discussed and agreed with the final plan.    Jhon Gutierrez MD  Faculty - [facility]

## 2020-11-03 NOTE — RESULT ENCOUNTER NOTE
"As discussed in clinic, your blood sugar average (hemoglobin A1c) is 8.4%, which is at your goal of less than 8.5%.  Continue to make healthy diet choices, walk daily, and continue your current insulin regimen.  We will repeat this test in 6 months.    You have chronic low kidney function, but your kidney function is STABLE over the past 2 years.  As it has over the past 2 years, your urine test continues to show a small amount of protein due to diabetes.  We will continue to monitor your kidney function closely, and repeat this blood kidney test in 3 to 6 months.  It is important to continue to control your blood pressure and diabetes to protect your kidneys.    Also, avoid advil, ibuprofen and other \"NSAID\" type over the counter pain relievers which can affect the kidneys.  Tylenol (also called acetaminophen) is safe to take as needed.    Your cholesterol is well controlled.  Continue Atorvostatin.    See me (Dr. Gutierrez) in about 3 months, certainly no later than 6 months, to follow up on your kidney function and diabetes.    "

## 2021-01-04 DIAGNOSIS — I10 ESSENTIAL HYPERTENSION: ICD-10-CM

## 2021-01-04 RX ORDER — LISINOPRIL 20 MG/1
20 TABLET ORAL DAILY
Qty: 90 TABLET | Refills: 3 | Status: SHIPPED | OUTPATIENT
Start: 2021-01-04 | End: 2021-03-08

## 2021-01-04 NOTE — TELEPHONE ENCOUNTER
[facility] phone call message- medication clarification/question:    Full Medication Name: lisinopril (ZESTRIL) 20 MG tablet         Pharmacy confirmed as      [facility] - [place], [place] - [address] [place] [address]: Yes    OK to leave a message on voice mail? Yes    Primary language: English      needed? No    Call taken on January 4, 2021 at 9:16 AM by Lobito Barney

## 2021-02-15 DIAGNOSIS — I67.9 CEREBRAL VASCULAR DISEASE: ICD-10-CM

## 2021-02-15 RX ORDER — ATORVASTATIN CALCIUM 40 MG/1
40 TABLET, FILM COATED ORAL DAILY
Qty: 90 TABLET | Refills: 3 | Status: SHIPPED | OUTPATIENT
Start: 2021-02-15 | End: 2021-12-15

## 2021-02-15 NOTE — TELEPHONE ENCOUNTER
[facility] phone call message- medication clarification/question:    Full Medication Name: atorvastatin (LIPITOR) 40 MG tablet      Question: Patient requesting refill    Pharmacy confirmed as      [facility]  FOR [address]  : Yes    OK to leave a message on voice mail? Yes    Primary language: English      needed? No    Call taken on February 15, 2021 at 11:15 AM by Lobito Barney

## 2021-03-01 DIAGNOSIS — E11.65 TYPE 2 DIABETES MELLITUS WITH HYPERGLYCEMIA, WITH LONG-TERM CURRENT USE OF INSULIN (H): ICD-10-CM

## 2021-03-01 DIAGNOSIS — N18.2 TYPE 2 DIABETES MELLITUS WITH STAGE 2 CHRONIC KIDNEY DISEASE, WITH LONG-TERM CURRENT USE OF INSULIN (H): ICD-10-CM

## 2021-03-01 DIAGNOSIS — Z79.4 TYPE 2 DIABETES MELLITUS WITH HYPERGLYCEMIA, WITH LONG-TERM CURRENT USE OF INSULIN (H): ICD-10-CM

## 2021-03-01 DIAGNOSIS — E11.22 TYPE 2 DIABETES MELLITUS WITH STAGE 2 CHRONIC KIDNEY DISEASE, WITH LONG-TERM CURRENT USE OF INSULIN (H): ICD-10-CM

## 2021-03-01 DIAGNOSIS — Z79.4 TYPE 2 DIABETES MELLITUS WITH STAGE 2 CHRONIC KIDNEY DISEASE, WITH LONG-TERM CURRENT USE OF INSULIN (H): ICD-10-CM

## 2021-03-01 RX ORDER — BLOOD SUGAR DIAGNOSTIC
STRIP MISCELLANEOUS
Qty: 300 STRIP | Refills: 11 | Status: SHIPPED | OUTPATIENT
Start: 2021-03-01 | End: 2022-02-28

## 2021-03-01 NOTE — TELEPHONE ENCOUNTER
Mychart refill request for Novolin pen needles and Free Style test strips. Please send to [facility] per pt. Denis PARNELL

## 2021-03-08 ENCOUNTER — OFFICE VISIT (OUTPATIENT)
Dept: FAMILY MEDICINE | Facility: CLINIC | Age: 86
End: 2021-03-08
Payer: MEDICARE

## 2021-03-08 VITALS
DIASTOLIC BLOOD PRESSURE: 64 MMHG | HEART RATE: 92 BPM | OXYGEN SATURATION: 97 % | HEIGHT: 72 IN | RESPIRATION RATE: 16 BRPM | WEIGHT: 209.4 LBS | TEMPERATURE: 98.4 F | SYSTOLIC BLOOD PRESSURE: 114 MMHG | BODY MASS INDEX: 28.36 KG/M2

## 2021-03-08 DIAGNOSIS — E11.65 TYPE 2 DIABETES MELLITUS WITH HYPERGLYCEMIA, WITH LONG-TERM CURRENT USE OF INSULIN (H): Primary | ICD-10-CM

## 2021-03-08 DIAGNOSIS — Z79.4 TYPE 2 DIABETES MELLITUS WITH HYPERGLYCEMIA, WITH LONG-TERM CURRENT USE OF INSULIN (H): Primary | ICD-10-CM

## 2021-03-08 DIAGNOSIS — N18.31 CHRONIC KIDNEY DISEASE, STAGE 3A (H): ICD-10-CM

## 2021-03-08 DIAGNOSIS — E78.5 HYPERLIPIDEMIA LDL GOAL <100: ICD-10-CM

## 2021-03-08 DIAGNOSIS — I10 ESSENTIAL HYPERTENSION: ICD-10-CM

## 2021-03-08 DIAGNOSIS — I67.9 CEREBRAL VASCULAR DISEASE: ICD-10-CM

## 2021-03-08 LAB
ANION GAP SERPL CALCULATED.3IONS-SCNC: 12 MMOL/L (ref 5–18)
BUN SERPL-MCNC: 29 MG/DL (ref 8–28)
CALCIUM SERPL-MCNC: 9.5 MG/DL (ref 8.5–10.5)
CHLORIDE SERPL-SCNC: 107 MMOL/L (ref 98–107)
CO2 SERPL-SCNC: 20 MMOL/L (ref 22–31)
CREAT SERPL-MCNC: 1.54 MG/DL (ref 0.7–1.3)
GLUCOSE SERPL-MCNC: 254 MG/DL (ref 70–125)
HBA1C MFR BLD: 8.2 % (ref 4.1–5.7)
POTASSIUM SERPL-SCNC: 5 MMOL/L (ref 3.5–5)
SODIUM SERPL-SCNC: 139 MMOL/L (ref 136–145)

## 2021-03-08 PROCEDURE — 99214 OFFICE O/P EST MOD 30 MIN: CPT | Performed by: FAMILY MEDICINE

## 2021-03-08 PROCEDURE — 83036 HEMOGLOBIN GLYCOSYLATED A1C: CPT | Performed by: FAMILY MEDICINE

## 2021-03-08 PROCEDURE — 36415 COLL VENOUS BLD VENIPUNCTURE: CPT | Performed by: FAMILY MEDICINE

## 2021-03-08 RX ORDER — LISINOPRIL 10 MG/1
10 TABLET ORAL DAILY
Qty: 90 TABLET | Refills: 0 | Status: SHIPPED | OUTPATIENT
Start: 2021-03-08 | End: 2021-12-15

## 2021-03-08 ASSESSMENT — MIFFLIN-ST. JEOR: SCORE: 1655.45

## 2021-03-08 NOTE — PATIENT INSTRUCTIONS
For diabetes, continue your current medications:  Goal fasting glucose 90 to 180  Goal glucose after you eat less than 200    For memory:  Continue to stay PHYSICALLY active  Stay mentally active with puzzles and social interaction each day  Safe to take a B vitamin supplement each day    For blood pressure reduce LISINOPRIL from 20mg to 10mg daily.  Goal blood pressure 140/90 or less    Check home blood pressure 1 to 2 times per week for the next 6 weeks and call Dr. Gutierrez with readings to be sure your blood pressure is okay with the new dose of LISINOPRIL       See Dr. Gutierrez in 3 months, early as needed

## 2021-03-08 NOTE — RESULT ENCOUNTER NOTE
Your diabetes is under good control, your A1c is 8.2%, with a goal of 8.5% or less.    While you have chronic kidney disease and lower than average kidney function, your kidney function is stable over the past 2 years.  With continued good control of your diabetes and blood pressure, I do not expect you will need further kidney treatments such as dialysis.      You should return to clinic to see Dr. Gutierrez in 3 months.

## 2021-03-08 NOTE — PROGRESS NOTES
HPI:   Elie Martinez is a 88 year old male who presents for:    Chief Complaint   Patient presents with     Diabetes     follow-up     Medication Reconciliation     completed       DM2:  Checking glucoses 1-2 times daily.  Rolating times.  Glucoses typically fasting 120's-140's.  Post prandial 180-220.  Lowest in past 3 months 92.    No hypoglycemic symptoms.    Scheduled to have COVID vaccine tomorrow in [place].    HTN: no orthostatic symptoms or dizziness. No chest pain. No dyspnea.   last visit. 114/64 today.  Consider reducing Lisinopril from 20mg to 10mg. Daily.    Memory: no change continues to have some short-term memory difficulty which does not bother the patient.  Occasionally misplaces items.  Occasionally has difficulty remembering a name.  He is here with his daughter today who reports no significant change.  He maintains his independence and living, and managing his finances mostly independently.  Do not worry about him getting lost.  Not interested in medications for memory.  Wondering if it safe to take a vitamin B supplement.         PMHX:     Patient Active Problem List   Diagnosis     Long-term insulin use in type 2 diabetes (H)     Cerebral vascular disease     S/P carotid endarterectomy     Hypertension     Prostate cancer (H)     Hyperlipidemia LDL goal <100     Chronic kidney disease, stage 3a     Chronic renal disease, stage II     Type 2 diabetes mellitus with hyperglycemia, with long-term current use of insulin (H)       Current Outpatient Medications   Medication Sig Dispense Refill     aspirin 325 MG tablet Take 1 tablet (325 mg) by mouth daily 90 tablet 3     atorvastatin (LIPITOR) 40 MG tablet Take 1 tablet (40 mg) by mouth daily 90 tablet 3     blood glucose (FREESTYLE TEST STRIPS) test strip Use to test blood sugar 3 times daily or as directed. 300 strip 11     blood glucose (NO BRAND SPECIFIED) lancets standard Use to test blood sugar 3 times daily or as directed.  100 each 11     blood glucose (NO BRAND SPECIFIED) lancets standard Use to test blood sugar 3 times daily or as directed. 300 each 3     blood glucose (NO BRAND SPECIFIED) test strip Use to test blood sugars 3 times daily or as directed 300 each 3     blood glucose monitoring (FREESTYLE FREEDOM LITE) meter device kit Use to test blood sugar 3 times daily or as directed. 1 kit 0     blood glucose monitoring (NO BRAND SPECIFIED) meter device kit Use to test blood sugar 3 times daily or as directed. 1 kit 0     insulin aspart (NOVOLOG PEN) 100 UNIT/ML pen Inject 5 Units Subcutaneous 3 times daily (with meals) 6 mL 3     insulin pen needle (NOVOFINE) 32G X 6 MM miscellaneous Use with insulin pen four times daily or as directed 400 each 11     lisinopril (ZESTRIL) 20 MG tablet Take 1 tablet (20 mg) by mouth daily 90 tablet 3     metFORMIN (GLUCOPHAGE-XR) 500 MG 24 hr tablet Take 1 tablet (500 mg) by mouth daily with food 90 tablet 0     Multiple Vitamins-Minerals (ICAPS AREDS FORMULA) TABS Take 1 tablet by mouth 2 times daily       insulin glargine (LANTUS SOLOSTAR) 100 UNIT/ML pen Inject 40 Units Subcutaneous At Bedtime 9 mL 3       Social History     Tobacco Use     Smoking status: Former Smoker     Types: Cigarettes     Quit date: 1994     Years since quittin.8     Smokeless tobacco: Former User     Quit date: 1952   Substance Use Topics     Alcohol use: Yes     Alcohol/week: 0.0 standard drinks     Comment: 2 beers/week     Drug use: No       Social History     Social History Narrative     Not on file       Allergies   Allergen Reactions     No Known Allergies        Results for orders placed or performed in visit on 21 (from the past 24 hour(s))   Hemoglobin A1c ([facility])   Result Value Ref Range    Hemoglobin A1C 8.2 (H) 4.1 - 5.7 %            Review of Systems:     General: No fever or recent illness  ENT: No upper respiratory symptoms.  No cough.  Respiratory: No shortness of breath.  No  "cough  Cardiovascular: No chest pain.  No palpitations.  No dizziness lightheadedness or orthostatic symptoms.  GI: No nausea vomiting or stool changes  Skin: No skin wounds on his feet.  Neuro: No weakness or paresthesias           Physical Exam:     Vitals:    03/08/21 1023   BP: 114/64   BP Location: Right arm   Cuff Size: Adult Regular   Pulse: 92   Resp: 16   Temp: 98.4  F (36.9  C)   TempSrc: Oral   SpO2: 97%   Weight: 95 kg (209 lb 6.4 oz)   Height: 1.825 m (5' 11.85\")     Body mass index is 28.52 kg/m .    General: Alert,   in no acute distress  HEENT: Head is free of trauma.   Sclerae non-icteric. PERRL, Moist oral mucus membranes, no tonsilar hypertrophy or exudate  Resp: Clear to auscultation bilaterally with fair air exchange bilat  CV: Quiet, regular rate and rhythm  Abd: Soft, non-tender.  Ext: Warm.  No significant edema  Skin: exposed skin free of rash  Psych: Mood appropriate   Neuro: able to relate his own history and medications.  Transfers from chair without assistance.    Results for orders placed or performed in visit on 03/08/21   Hemoglobin A1c ([facility])     Status: Abnormal   Result Value Ref Range    Hemoglobin A1C 8.2 (H) 4.1 - 5.7 %   Basic Metabolic Profile ([facility]) - Results > 1 hr     Status: Abnormal   Result Value Ref Range    Sodium 139 136 - 145 mmol/L    Potassium 5.0 3.5 - 5.0 mmol/L    Chloride 107 98 - 107 mmol/L    CO2, Total 20 (L) 22 - 31 mmol/L    Anion Gap 12 5 - 18 mmol/L    Glucose 254 (H) 70 - 125 mg/dL    Calcium 9.5 8.5 - 10.5 mg/dL    Urea Nitrogen 29 (H) 8 - 28 mg/dL    Creatinine 1.54 (H) 0.70 - 1.30 mg/dL    GFR Estimate If Black 52 (L) >60 mL/min/1.73m2    GFR Estimate 43 (L) >60 mL/min/1.73m2    Narrative    Test performed by:  [facility]  [address] [place] [address]  Fasting Glucose reference range is 70-99 mg/dL per  American Diabetes Association (ADA) guidelines.           Assessment and Plan   1. Type 2 diabetes " mellitus with hyperglycemia, with long-term current use of insulin (H)    - Hemoglobin A1c ([facility])    A1c 8.2%, at goal of 8.5% or less  Goal fasting glucose 100-180  Goal preprandial glucose less than 200, postprandial less than 250    Continue metformin 500 mg once daily (dose limited by loose stools)  Lantus and meal insulin as outlined in the medication list  He has not had any hypoglycemic readings or symptoms    He is not interested in making medication change at this time, but if affordable in the future would consider an SGLT2 or GLP-1 in light of ease of use, and his chronic kidney disease    Recommend continue annual diabetic eye exam  Foot exam in June 2021    Repeat A1c in June 2021      2.  Hypertension: Systolic blood pressure was under 120 at his last visit and again today.  Recommend reducing his lisinopril from 20 mg to 10 mg daily.  Goal blood pressure 140/90 or less in light of his chronic kidney disease  His daughter will help him with home blood pressure monitoring and will call if his blood pressures regularly above goal, and in 6 weeks with all of his blood pressure readings.    Basic metabolic profile today    3.  Stage IIIa chronic kidney disease: GFR dipped slightly below 45 today.  Will reduce lisinopril dose due to relative hypotension as outlined above.  This may increase his GFR.  Plan to repeat basic metabolic profile in June 2021      Follow up: 6 weeks phone BP readings,  In person visit June 2021 for BMP, chronic kidney disease, DM  Options for treatment and follow-up care were reviewed with the patient and/or guardian. Elie Martinez and/or guardian engaged in the decision making process and verbalized understanding of the options discussed and agreed with the final plan.    Jhno Gutierrez MD  Faculty - [facility]

## 2021-03-09 ENCOUNTER — IMMUNIZATION (OUTPATIENT)
Dept: PEDIATRICS | Facility: CLINIC | Age: 86
End: 2021-03-09
Payer: MEDICARE

## 2021-03-09 PROCEDURE — 0001A PR COVID VAC PFIZER DIL RECON 30 MCG/0.3 ML IM: CPT

## 2021-03-09 PROCEDURE — 91300 PR COVID VAC PFIZER DIL RECON 30 MCG/0.3 ML IM: CPT

## 2021-03-10 ENCOUNTER — TELEPHONE (OUTPATIENT)
Dept: FAMILY MEDICINE | Facility: CLINIC | Age: 86
End: 2021-03-10

## 2021-03-10 NOTE — TELEPHONE ENCOUNTER
Returned call to patient, was not able to leave a message as there was no answering machine. Pt Lisinopril was refill on 3/8/21 for 90 day supply and sent to [facility].

## 2021-03-10 NOTE — TELEPHONE ENCOUNTER
Spoke to patient, informed him that Dr. Gutierrez [facility]21. Patient understood and will let us know if he does not receive his medication.

## 2021-03-10 NOTE — TELEPHONE ENCOUNTER
[facility] phone call message- medication clarification/question:    Full Medication Name: LISINOPRIL   Strength: 20 MG    Have you contacted your pharmacy about this refill request?     If  Yes,  which pharmacy?    When did you contact the pharmacy?    Additional comments/concerns from call to pharmacy:    Reason for call to clinic: Calling for refill above. Told him it could take up to two business days for a response. Please call and advise.       Pharmacy confirmed as    [facility] - [place], [place] - [address] [place] [address]: Yes    OK to leave a message on voice mail?     Primary language: English      needed? No    Call taken on March 10, 2021 at 12:22 PM by Kamran Salamanca

## 2021-03-30 ENCOUNTER — IMMUNIZATION (OUTPATIENT)
Dept: PEDIATRICS | Facility: CLINIC | Age: 86
End: 2021-03-30
Attending: INTERNAL MEDICINE
Payer: MEDICARE

## 2021-03-30 PROCEDURE — 91300 PR COVID VAC PFIZER DIL RECON 30 MCG/0.3 ML IM: CPT

## 2021-03-30 PROCEDURE — 0002A PR COVID VAC PFIZER DIL RECON 30 MCG/0.3 ML IM: CPT

## 2021-04-24 ENCOUNTER — HEALTH MAINTENANCE LETTER (OUTPATIENT)
Age: 86
End: 2021-04-24

## 2021-05-31 VITALS — WEIGHT: 220 LBS | HEIGHT: 72 IN | BODY MASS INDEX: 29.8 KG/M2

## 2021-06-01 VITALS — BODY MASS INDEX: 28.55 KG/M2 | WEIGHT: 212 LBS

## 2021-06-13 NOTE — PROGRESS NOTES
ASSESSMENT: Onychauxis, diabetes, foot pain.    PLAN: Toenails were debrided manually and mechanically x10. Return to clinic in nine weeks.         SUBJECTIVE: Toenails are long, thick and painful. Last nail debridement in the clinic was November 9, 2016.     OBJECTIVE:  General: Pleasant 85 y.o. male in no acute distress.  Vascular: DP pulses are diminished. PT pulses are absent. Pedal hair is absent. Feet are cool to the touch.  Neuro: Sensation in the feet is grossly intact to light touch.  Derm:  Toenails are elongated, thickened and dystrophic with discoloration and subungual debris. Skin is thin and shiny but otherwise intact.  Musculoskeletal: Adequate passive range of motion and foot and ankle joints.

## 2021-06-18 NOTE — PROGRESS NOTES
Assessment: Elie is here for diabetes education.  He has had type 2 diabetes since 1997.  He has stage 2 renal disease.   He is currently taking 500 metformin once daily(he cannot tolerate more), 5 mg glipizide before breakfast and dinner.   He was started on Lantus insulin in 2/2018 starting with 10 units and is currently taking 45 units at bedtime.  May 2018 Alc  11.4%. He eats three times daily:  BF:  cereal/toast/fruit   snack:  fruit   Lunch glucerna or frozen dinner    Dinner:  meat/starch/veg    bedtime:  popcorn    FBS: 229, 163, 289, 290, 282, 279  3 hours post dinner:   290, 301, 312, 299, 375, 224 mg/dl      When asked to return demonstration of insulin pen, he did not:  prime the pen, leave under skin for 10 sec. and more importantly, did not remove plastic needle cover.   He said, there is often insulin on his skin and he never sees the pen needle.       Plan: Contacted [facility] and talked with Alyson (RN) and Cat, the pharmacist, to inform of the incorrect insulin administration and patient is likely not getting any of the Lantus.  Cat stated his initial order was for 15 units Lantus and that he should start with that.  I informed Elie of this instruction and he verbalized and did return demo on insulin pen administration.  He verbalized his understanding to take 15 units Lantus starting tonight.  Juarez agreed to keep food and BG log and return in 2 weeks.      Subjective and Objective:      Elie Martinez is referred by Dr. Gutierrez for Diabetes Education.     No results found for: HGBA1C      Goals       medication            Juarez agreed to take 15 units Lantus insulin.  Follow written instructions provided.            Follow up:   Diabetes classes for 2 weeks.  [facility] was going to call him to follow up next week.      Education:     Monitoring   Meter (per above goals): assessment, discussed, pt returned demonstration,  Monitoring: assessment, discussed, pt returned demonstration,  literature provided   BG goals: assessment, discussed, pt returned demonstration, literature provided      Nutrition Management:  assessment, discussed, pt returned demo,  literature provided   Weight:assessment, discussed, pt returned demo,  literature provided   Portions/Balance: assessment, discussed, pt returned demo,  literature provided   Carb ID/Count: assessment, discussed, pt returned demo,  literature provided   Label Reading: assessment, discussed, pt returned demo,  literature provided   Heart Healthy Fats:assessment, discussed, pt returned demo,  literature provided   Menu Planning: assessment, discussed, pt returned demo,  literature provided   Dining Out: assessment, discussed,  literature provided   Physical Activity: assessment, discussed,  literature provided   Medications: assessment, discussed  Orals: assessment, discussed  Injected Medications: Discussed, Literature provided and Patient returned demonstration   Storage/Exp:Discussed, Literature provided, Patient returned demonstration and Needs instruction/review at follow-up   Site Rotation: Discussed, Literature provided, Patient returned demonstration and Needs instruction/review at follow-up     Diabetes Disease Process: Discussed    Acute Complications: Prevent, Detect, Treat:  Hypoglycemia: Discussed, Literature provided and Patient returned demonstration  Hyperglycemia: Discussed, Literature provided and Patient returned demonstration  Sick Days: Literature provided    Chronic Complications  Foot Care: literature provided  Skin Care: Literature provided  Eye: Literature provided  ABC: Literature provided  Teeth:Literature provided  Goal Setting and Problem Solving: Discussed  Barriers: Discussed  Psychosocial Adjustments: Discussed      Time spent with the patient: 60 minutes for diabetes education and counseling.   Previous Education: yes  Visit Type:JOSE R Rendon  5/30/2018

## 2021-06-18 NOTE — PROGRESS NOTES
Elie is here for follow up diabetes education.  He has had type 2 diabetes since 1997.  He has stage 2 renal disease.   He is currently taking 500 metformin once daily(he cannot tolerate more), 5 mg glipizide before breakfast and dinner.   Two weeks ago his insulin pen technique had been corrected.  Previously, he had been on 45 units Lantus at bedtime -  he had not been removing the second plastic cap, therefore was not getting any insulin.   This has been corrected and he is currently taking 20 units Lantus at bedtime.  He correctly returned the insulin pen demonstration.  He eats three times daily: reports good appetite.  BG are improving:  no symptoms of hypoglycemia reported.     FBS: 219, 161, 134, 149 mg/dl  2 hours post dinner:   252, 225, 260, 242, mg/dl       Plan:  Following [facility] Protocol, to increase Lantus insulin by 10% if FBS >130mg/dl,  to increase to 22 units Lantus at bedtime.  I think he is also a candidate for meal time insulin but will need to consult with Dr. Gutierrez at [facility] before starting this.    Juarez has appointment in one week with Dr. Gutierrez.   He agreed to keep food and BG log and return in 2 weeks.  Reviewed carb guidelines for meals.  Juarez repeated the recommendation for increasing the insulin.  Reviewed signs/treatment for hypoglycemia.      Subjective and Objective:      Elie Martinez is referred by Dr. Gutierrez for Diabetes Education.     No results found for: HGBA1C      Goals       medication            Juarez agreed to take 15 units Lantus insulin.  Follow written instructions provided.            Follow up:   Primary care visit      Education:     Monitoring   Meter (per above goals): assessment, discussed, pt returned demonstration,  Monitoring: assessment, discussed, pt returned demonstration, literature provided   BG goals: assessment, discussed, pt returned demonstration, literature provided      Nutrition Management:  assessment, discussed, pt returned demo   literature provided   Weight:assessment, discussed, pt returned demo,  literature provided   Portions/Balance: assessment, discussed, pt returned demo,  literature provided   Carb ID/Count: assessment, discussed, pt returned demo,  literature provided   Label Reading: assessment, discussed, pt returned demo,  literature provided   Heart Healthy Fats:assessment, discussed, pt returned demo,  literature provided   Menu Planning: assessment, discussed, pt returned demo,  literature provided   Dining Out: assessment, discussed,  literature provided   Physical Activity: assessment, discussed,  literature provided   Medications: assessment, discussed  Orals: assessment, discussed  Injected Medications: Assessed and Discussed   Storage/Exp:Discussed   Site Rotation: Discussed and Literature provided     Diabetes Disease Process: Discussed    Acute Complications: Prevent, Detect, Treat:  Hypoglycemia: Discussed, Literature provided and Patient returned demonstration  Hyperglycemia: Discussed    Goal Setting and Problem Solving: Discussed  Barriers: Discussed  Psychosocial Adjustments: Discussed      Time spent with the patient: 30 minutes for diabetes education and counseling.   Previous Education: yes  Visit Type:JOSE R Rendon  6/12/2018

## 2021-06-19 NOTE — PROGRESS NOTES
Assessment:   Elie is here for follow up diabetes education.  He has had type 2 diabetes since 1997.  He has stage 2 renal disease.  He is taking 26 units Lantus insulin at bedtime, , and 7 units Novolog insulin before each meal, and 500 metformin once daily(that is all he can tolerate).      He is testing BG daily:    FBS:  179, 181, 165, 204, 166, 192 mg/dl  2 hr post lunch:  226, 198, 199, 180, 201, 210 mg/dl  2 hr post dinner  240, 157, 203, 207, 204 mg/dl    Elie reports having good appetite, he doesn't forget to take insulin, at times he might remember as he is eating, but will take it then.   Eating 3 meals, occ snacks.  He is quite active, planning a trip to [facility] later this month.  He likes to go to [employer] games, and  meet friends for lunch, etc.   He does take insulin pen with him if eating away from home.   He has not had any symptoms of hypoglycemia.      As per Dr. Jhon Gutierrez at [facility] glucose targets are:  BG goals are FBS <150    2 hours post  ,< 250 mg/dl    Food recall:   BF:  cereal/milk/toast/fruit  or oatmeal or ron/eggs          Lunch:  sandwich at home/bread/meat/cheese  or crackers/cheese        dinner:   spaghetti / microwavable meal/milk  drinks milk, likes fruit, & vegetables        Plan:  Following HE Protocol, to increase Lantus insulin by 10% -- FBS  not meeting goal @ 150 mg/dl.  To increase Lantus to 28 units at bedtime.   Novolog is to stay at 7 units before each meal.  Juarez repeated the recommendation for increasing the insulin.  Reviewed signs/treatment for hypoglycemia.  Discussed having meals (with insulin) at least 3-4 hours apart to avoid stacking of insulin. .  Discussed taking additional foods/snacks when traveling. He is due for Alc, he has appointment to see Dr. Gutierrez.  To call before then with any symptoms or  concerns about hypoglycemia          Subjective and Objective:        Elie Martinez is referred by Dr. Gutierrez for Diabetes Education.        HGBA1C  12.9%  January 2018          Follow up:  Primary care provider             Education:       Monitoring   Meter (per above goals): assessment, discussed, pt returned demonstration,- competent  Monitoring: assessment, discussed, pt returned demonstration, literature provided- competent   BG goals: assessment, discussed, pt returned demonstration, literature provided          Nutrition Management:  assessment, discussed, pt returned demo,  literature provided   Weight:assessment, discussed, pt returned demo,  literature provided   Portions/Balance: assessment, discussed, pt returned demo,  literature provided   Carb ID/Count: assessment, discussed, pt returned demo,  literature provided   Menu Planning: assessment, discussed, pt returned demo,  literature provided -  Dining Out: assessment, discussed,  literature provided   Physical Activity: assessment, discussed,  literature provided - encouraged as able  Medications: assessment, discussed  Orals: assessment, discussed  Injected Medications: Assessed and Discussed   Storage/Exp:Discussed   Site Rotation: Discussed and Literature provided       Diabetes Disease Process: Discussed      Acute Complications: Prevent, Detect, Treat:  Hypoglycemia: Discussed, Literature provided and Patient returned demonstration-    Hyperglycemia: Discussed      Goal Setting and Problem Solving: Discussed,  travel, eating out,   Barriers: Discussed  Psychosocial Adjustments: Discussed          Time spent with the patient: 30 minutes for diabetes education and counseling.   Previous Education: yes  Visit Type:  MNT

## 2021-06-21 LAB — RETINOPATHY: NORMAL

## 2021-07-27 DIAGNOSIS — E11.65 TYPE 2 DIABETES MELLITUS WITH HYPERGLYCEMIA, WITHOUT LONG-TERM CURRENT USE OF INSULIN (H): ICD-10-CM

## 2021-07-27 NOTE — TELEPHONE ENCOUNTER
[facility] phone call message- medication clarification/question:    Full Medication Name: insulin glargine (LANTUS SOLOSTAR)   Dose: 100 unit/ ml pen    Question: Patient is almost out and will been needing soon.      Pharmacy confirmed as    [facility] - [place], [place] - [address] [place] [address] ROAD: Yes    OK to leave a message on voice mail? Yes    Primary language: English      needed? No    Call taken on July 27, 2021 at 10:16 AM by Yoli Salamanca

## 2021-08-20 ENCOUNTER — HOSPITAL ENCOUNTER (EMERGENCY)
Facility: HOSPITAL | Age: 86
Discharge: HOME | End: 2021-08-20
Attending: EMERGENCY MEDICINE | Admitting: EMERGENCY MEDICINE
Payer: MEDICARE

## 2021-08-20 ENCOUNTER — APPOINTMENT (OUTPATIENT)
Dept: RADIOLOGY | Facility: HOSPITAL | Age: 86
End: 2021-08-20
Attending: EMERGENCY MEDICINE
Payer: MEDICARE

## 2021-08-20 ENCOUNTER — APPOINTMENT (OUTPATIENT)
Dept: ULTRASOUND IMAGING | Facility: HOSPITAL | Age: 86
End: 2021-08-20
Attending: EMERGENCY MEDICINE
Payer: MEDICARE

## 2021-08-20 VITALS
OXYGEN SATURATION: 96 % | SYSTOLIC BLOOD PRESSURE: 152 MMHG | WEIGHT: 207 LBS | HEART RATE: 75 BPM | RESPIRATION RATE: 18 BRPM | TEMPERATURE: 97.9 F | HEIGHT: 72 IN | BODY MASS INDEX: 28.04 KG/M2 | DIASTOLIC BLOOD PRESSURE: 70 MMHG

## 2021-08-20 DIAGNOSIS — M79.671 RIGHT FOOT PAIN: ICD-10-CM

## 2021-08-20 LAB
ALBUMIN SERPL-MCNC: 3.7 G/DL (ref 3.5–5)
ALP SERPL-CCNC: 90 U/L (ref 45–120)
ALT SERPL W P-5'-P-CCNC: 19 U/L (ref 0–45)
ANION GAP SERPL CALCULATED.3IONS-SCNC: 10 MMOL/L (ref 5–18)
AST SERPL W P-5'-P-CCNC: 28 U/L (ref 0–40)
BASOPHILS # BLD AUTO: 0.1 10E3/UL (ref 0–0.2)
BASOPHILS NFR BLD AUTO: 1 %
BILIRUB SERPL-MCNC: 0.6 MG/DL (ref 0–1)
BUN SERPL-MCNC: 24 MG/DL (ref 8–28)
C REACTIVE PROTEIN LHE: 0.7 MG/DL (ref 0–0.8)
CALCIUM SERPL-MCNC: 9.7 MG/DL (ref 8.5–10.5)
CHLORIDE BLD-SCNC: 111 MMOL/L (ref 98–107)
CO2 SERPL-SCNC: 18 MMOL/L (ref 22–31)
CREAT SERPL-MCNC: 1.27 MG/DL (ref 0.7–1.3)
EOSINOPHIL # BLD AUTO: 0.1 10E3/UL (ref 0–0.7)
EOSINOPHIL NFR BLD AUTO: 1 %
ERYTHROCYTE [DISTWIDTH] IN BLOOD BY AUTOMATED COUNT: 13.2 % (ref 10–15)
ERYTHROCYTE [SEDIMENTATION RATE] IN BLOOD BY WESTERGREN METHOD: 34 MM/HR (ref 0–15)
GFR SERPL CREATININE-BSD FRML MDRD: 50 ML/MIN/1.73M2
GLUCOSE BLD-MCNC: 109 MG/DL (ref 70–125)
HCT VFR BLD AUTO: 40.2 % (ref 40–53)
HGB BLD-MCNC: 12.9 G/DL (ref 13.3–17.7)
IMM GRANULOCYTES # BLD: 0 10E3/UL
IMM GRANULOCYTES NFR BLD: 0 %
LYMPHOCYTES # BLD AUTO: 1.7 10E3/UL (ref 0.8–5.3)
LYMPHOCYTES NFR BLD AUTO: 17 %
MCH RBC QN AUTO: 29.5 PG (ref 26.5–33)
MCHC RBC AUTO-ENTMCNC: 32.1 G/DL (ref 31.5–36.5)
MCV RBC AUTO: 92 FL (ref 78–100)
MONOCYTES # BLD AUTO: 0.9 10E3/UL (ref 0–1.3)
MONOCYTES NFR BLD AUTO: 9 %
NEUTROPHILS # BLD AUTO: 7.1 10E3/UL (ref 1.6–8.3)
NEUTROPHILS NFR BLD AUTO: 72 %
NRBC # BLD AUTO: 0 10E3/UL
NRBC BLD AUTO-RTO: 0 /100
PLATELET # BLD AUTO: 259 10E3/UL (ref 150–450)
POTASSIUM BLD-SCNC: 5.2 MMOL/L (ref 3.5–5)
PROT SERPL-MCNC: 7.7 G/DL (ref 6–8)
RBC # BLD AUTO: 4.38 10E6/UL (ref 4.4–5.9)
SODIUM SERPL-SCNC: 139 MMOL/L (ref 136–145)
WBC # BLD AUTO: 9.9 10E3/UL (ref 4–11)

## 2021-08-20 PROCEDURE — 85004 AUTOMATED DIFF WBC COUNT: CPT | Performed by: EMERGENCY MEDICINE

## 2021-08-20 PROCEDURE — 93971 EXTREMITY STUDY: CPT | Mod: RT

## 2021-08-20 PROCEDURE — 99285 EMERGENCY DEPT VISIT HI MDM: CPT | Mod: 25

## 2021-08-20 PROCEDURE — 85652 RBC SED RATE AUTOMATED: CPT | Performed by: EMERGENCY MEDICINE

## 2021-08-20 PROCEDURE — 96374 THER/PROPH/DIAG INJ IV PUSH: CPT

## 2021-08-20 PROCEDURE — 82040 ASSAY OF SERUM ALBUMIN: CPT | Performed by: EMERGENCY MEDICINE

## 2021-08-20 PROCEDURE — 82247 BILIRUBIN TOTAL: CPT | Performed by: EMERGENCY MEDICINE

## 2021-08-20 PROCEDURE — 250N000011 HC RX IP 250 OP 636: Performed by: EMERGENCY MEDICINE

## 2021-08-20 PROCEDURE — 73630 X-RAY EXAM OF FOOT: CPT | Mod: RT

## 2021-08-20 PROCEDURE — 86141 C-REACTIVE PROTEIN HS: CPT | Performed by: EMERGENCY MEDICINE

## 2021-08-20 PROCEDURE — 93922 UPR/L XTREMITY ART 2 LEVELS: CPT

## 2021-08-20 PROCEDURE — 36415 COLL VENOUS BLD VENIPUNCTURE: CPT | Performed by: EMERGENCY MEDICINE

## 2021-08-20 PROCEDURE — 93926 LOWER EXTREMITY STUDY: CPT | Mod: GZ

## 2021-08-20 RX ORDER — KETOROLAC TROMETHAMINE 15 MG/ML
15 INJECTION, SOLUTION INTRAMUSCULAR; INTRAVENOUS ONCE
Status: COMPLETED | OUTPATIENT
Start: 2021-08-20 | End: 2021-08-20

## 2021-08-20 RX ADMIN — KETOROLAC TROMETHAMINE 15 MG: 15 INJECTION, SOLUTION INTRAMUSCULAR; INTRAVENOUS at 16:51

## 2021-08-20 ASSESSMENT — MIFFLIN-ST. JEOR: SCORE: 1646.95

## 2021-08-20 NOTE — ED PROVIDER NOTES
EMERGENCY DEPARTMENT NOTE     Name: Elie Martinez    Age/Sex: 88 year old male   MRN: [id number]   Evaluation Date & Time:  8/20/2021  3:08 PM    PCP:    Jhon Gutierrez   ED Provider: Terence Chacko D.O.       CHIEF COMPLAINT    Right Foot pain    DIAGNOSIS & DISPOSITION     1. Right foot pain      DISPOSITION: Home    At the conclusion of the encounter I discussed the results of all of the tests and the disposition. The questions were answered. The patient or family acknowledged understanding and was agreeable with the care plan.    TOTAL CRITICAL CARE TIME (EXCLUDING PROCEDURES): Not applicable    PROCEDURES:   None    EMERGENCY DEPARTMENT COURSE/MEDICAL DECISION MAKING   3:11 PM I met with the patient to gather history and to perform my initial exam.  We discussed treatment options and the plan for care while in the Emergency Department. [place] PPE was worn during the entire patient encounter.  5:28 PM I rechecked and updated the patient.  7:47 PM I rechecked and updated the patient.    Elie Martinez is a 88 year old male with relevant past history of insulin-dependent diabetes, hypertension, hyperlipidemia who presents to the emergency department for evaluation of right foot pain.     Patient describes the pain as sharp and aching initially intermittent now more constant.  Patient reports weeks to months of pain is localizing to the mid right foot on the dorsal aspect.  No known trauma.  Patient denies definitive swelling or erythema.  Patient has no known history of diabetic neuropathy.  He has not noted  sores on his feet.  Another review of systems he denied calf pain or swelling, fever, shortness of breath or palpitations.  Patient has no known heart disease.  Triage note reviewed:Patient is having right foot pain, densy injury.  Son with him says he gets this pain sometimes but today is more acute.  Patient is diabetic, no sores on his feet.     Differential  diagnosis  considered included but not limited  to lower extremity ischemia or DVT, infection including osteomyelitis, other inflammatory process including gout, occult fracture, other diabetes related condition including neuropathy.  Vital signs:BP (!) 152/70   Pulse 75   Temp 97.9  F (36.6  C) (Oral)   Resp 18   Ht 1.829 m (6')   Wt 93.9 kg (207 lb)   SpO2 96%   BMI 28.07 kg/m    Pertinent physical exam findings:  Cardiac: Regular rate and rhythm S1-S2 without murmur rub  Peripheral vascular: Palpable dorsalis pedis and lower extremity does not appear to be ischemic  Pulmonary: Lungs are clear to ascultation bilaterally with good breath sounds  Abdomen: Soft nontender, positive bowel sounds.  No organomegaly or mass or pulsatile mass  Musculoskeletal/skin.  Patient reports area of greatest tenderness at the proximal right midfoot.  There is slight quarter size discoloration overlying the navicular but not definitive erythema.  Skin is otherwise intact.  Right  ankle has full range of motion without effusion, right  knee without effusion, calf without apparent swelling or erythema  Diagnostic studies:  Imaging: Right foot x-ray: No fracture or bony abnormality  Ultrasound right lower extremity: No DVT  Lab: BC 9.9, CRP 0.7, sed rate 34, comprehensive metabolic profile within normal limits except for potassium 5.2 with normal renal function  Interventions: IV ketorolac.  Medical decision making: Patient had improvement in pain and currently minimal.  Patient has chronic gait instability and will prescribe walker for use at home for ambulation assistance.  To continue Tylenol and ibuprofen for pain.  Follow-up with primary care physician on Monday.  Frequent foot pain not improved with Tylenol ibuprofen rest any progressive symptoms including development of increased swelling erythema of the lower extremity will return to the emergency department.    ED INTERVENTIONS     Medications   ketorolac (TORADOL) injection 15 mg (15 mg Intravenous Given 8/20/21  3626)       DISCHARGE MEDICATIONS        Review of your medicines      UNREVIEWED medicines. Ask your doctor about these medicines      Dose / Directions   aspirin 325 MG tablet  Commonly known as: ASA  Used for: Cerebral vascular disease      Dose: 325 mg  Take 1 tablet (325 mg) by mouth daily  Quantity: 90 tablet  Refills: 3     atorvastatin 40 MG tablet  Commonly known as: Lipitor  Used for: Cerebral vascular disease      Dose: 40 mg  Take 1 tablet (40 mg) by mouth daily  Quantity: 90 tablet  Refills: 3     ICaps AREDS Formula Tabs      Dose: 1 tablet  Take 1 tablet by mouth 2 times daily  Refills: 0     insulin aspart 100 UNIT/ML pen  Commonly known as: NovoLOG PEN  Used for: Type 2 diabetes mellitus with hyperglycemia, with long-term current use of insulin (H)      Dose: 5 Units  Inject 5 Units Subcutaneous 3 times daily (with meals)  Quantity: 6 mL  Refills: 3     insulin glargine 100 UNIT/ML pen  Commonly known as: Lantus SoloStar  Used for: Type 2 diabetes mellitus with hyperglycemia, without long-term current use of insulin (H)      Dose: 40 Units  Inject 40 Units Subcutaneous At Bedtime  Quantity: 9 mL  Refills: 3     lisinopril 10 MG tablet  Commonly known as: ZESTRIL  Used for: Essential hypertension      Dose: 10 mg  Take 1 tablet (10 mg) by mouth daily  Quantity: 90 tablet  Refills: 0     metFORMIN 500 MG 24 hr tablet  Commonly known as: GLUCOPHAGE-XR  Used for: Type 2 diabetes mellitus with hyperglycemia, with long-term current use of insulin (H)      Dose: 500 mg  Take 1 tablet (500 mg) by mouth daily with food  Quantity: 90 tablet  Refills: 0        CONTINUE these medicines which have NOT CHANGED      Dose / Directions   * blood glucose lancets standard  Commonly known as: NO BRAND SPECIFIED  Used for: Type 2 diabetes mellitus with hyperglycemia, with long-term current use of insulin (H)      Use to test blood sugar 3 times daily or as directed.  Quantity: 300 each  Refills: 3     * blood glucose  lancets standard  Commonly known as: NO BRAND SPECIFIED  Used for: Type 2 diabetes mellitus with hyperglycemia, with long-term current use of insulin (H)      Use to test blood sugar 3 times daily or as directed.  Quantity: 100 each  Refills: 11     * blood glucose monitoring meter device kit  Commonly known as: NO BRAND SPECIFIED  Used for: Type 2 diabetes mellitus with hyperglycemia, with long-term current use of insulin (H)      Use to test blood sugar 3 times daily or as directed.  Quantity: 1 kit  Refills: 0     * blood glucose monitoring meter device kit  Used for: Type 2 diabetes mellitus with hyperglycemia, with long-term current use of insulin (H)      Use to test blood sugar 3 times daily or as directed.  Quantity: 1 kit  Refills: 0     * blood glucose test strip  Commonly known as: NO BRAND SPECIFIED  Used for: Type 2 diabetes mellitus with hyperglycemia, with long-term current use of insulin (H)      Use to test blood sugars 3 times daily or as directed  Quantity: 300 each  Refills: 3     * FREESTYLE TEST STRIPS test strip  Used for: Type 2 diabetes mellitus with hyperglycemia, with long-term current use of insulin (H)  Generic drug: blood glucose      Use to test blood sugar 3 times daily or as directed.  Quantity: 300 strip  Refills: 11     NovoFine 32G X 6 MM miscellaneous  Used for: Type 2 diabetes mellitus with stage 2 chronic kidney disease, with long-term current use of insulin (H)  Generic drug: insulin pen needle      Use with insulin pen four times daily or as directed  Quantity: 400 each  Refills: 11         * This list has 6 medication(s) that are the same as other medications prescribed for you. Read the directions carefully, and ask your doctor or other care provider to review them with you.                  INFORMATION SOURCE AND LIMITATIONS    History/Exam limitations: None  Patient information was obtained from: Patient  Use of : N/A    HISTORY OF PRESENT ILLNESS   Elie BUSTAMANTE  "Wing male with a relevant past history of diabetes mellitus II, hypertension, CKD III, and  hyperlipidemia, who presents to this ED by private car for evaluation of right foot pain.    Patient presents for evaluation of worsening right foot pain over the past 2 days. The pain is located on the top of the foot and the inside of his right foot. Denies any known leg swelling. Patient notes that the pain is worse with walking. He has a prior history of right foot pain that comes and goes intermittently over the last month, but over the last two days the pain has been constant and worsening. Patient is not on any blood thinners. He notes that he was a prior smoker but \"quit years ago.\" Patient states he has no prior heart or lung problems.    Denies fever, or any additional symptoms.      REVIEW OF SYSTEMS:   Constitutional: Negative for  fever.   HENT: Negative for URI symptoms or sore throat.    Eyes: Negative for visual disturbance.   Cardiac: Negative for  chest pain,palpitations, near syncope or syncope  Respiratory: Negative for cough and shortness of breath.    Gastrointestinal: Negative for abdominal pain, nausea, vomiting, constipation, diarrhea, rectal bleeding or melena.  Genitourinary: Negative for dysuria, flank pain and hematuria.   Musculoskeletal: Positive for right foot pain. Negative for back pain.   Skin: Negative for  rash  Neurological: Negative for dizziness, headache, syncope, speech difficulty, unilateral weakness or imbalance with walking.   Hematological: Negative for adenopathy. Does not bruise/bleed easily.   Psychiatric/Behavioral: Negative for confusion.       PATIENT HISTORY     Past Medical History:   Diagnosis Date     Cancer (H)      Cerebral vascular disease      Chronic kidney disease      Diabetes (H)      Hyperlipidemia      Hypertension      Patient Active Problem List   Diagnosis     Long-term insulin use in type 2 diabetes (H)     Cerebral vascular disease     S/P carotid " endarterectomy     Hypertension     Prostate cancer (H)     Hyperlipidemia LDL goal <100     Chronic kidney disease, stage 3a     Chronic renal disease, stage II     Type 2 diabetes mellitus with hyperglycemia, with long-term current use of insulin (H)     Past Surgical History:   Procedure Laterality Date     NO HISTORY OF SURGERY       US GUIDED NEEDLE PLACEMENT  10/21/2019     Social Histrory  Smoking:  Alcohol Use:  Allergies   Allergen Reactions     No Known Allergies          OUTPATIENT MEDICATIONS     Discharge Medication List as of 8/20/2021  8:04 PM         Vitals:    08/20/21 1417 08/20/21 1909   BP: (!) 156/72 (!) 152/70   Pulse: 77 75   Resp: 18 18   Temp: 97.9  F (36.6  C)    TempSrc: Oral    SpO2: 95% 96%   Weight: 93.9 kg (207 lb)    Height: 1.829 m (6')        Physical Exam   Constitutional: Oriented to person, place, and time. Appears well-developed and well-nourished.   HEENT:   Neck: Normal range of motion. Neck supple.   Cardiovascular: Normal rate, regular rhythm and normal heart sounds.    Pulmonary/Chest: Normal effort  and breath sounds normal.   Abdominal: Soft. Bowel sounds are normal.   Musculoskeletal: 1+ edema of the lower extremity.  No definitive cellulitis.  Patient has full range of motion of the ankle without evidence of effusion.  Right knee without effusion.  Neurological: Alert and oriented to person, place, and time. Normal strength.No sensory deficit. No cranial nerve deficit .   Skin: Skin is warm and dry.   Psychiatric: Normal mood and affect. Behavior is normal. Thought content normal.       DIAGNOSTICS    LABORATORY FINDINGS (REVIEWED AND INTERPRETED):  Labs Ordered and Resulted from Time of ED Arrival Up to the Time of Departure from the ED   COMPREHENSIVE METABOLIC PANEL - Abnormal; Notable for the following components:       Result Value    Potassium 5.2 (*)     Chloride 111 (*)     Carbon Dioxide (CO2) 18 (*)     GFR Estimate 50 (*)     All other components within  normal limits   ERYTHROCYTE SEDIMENTATION RATE AUTO - Abnormal; Notable for the following components:    Erythrocyte Sedimentation Rate 34 (*)     All other components within normal limits   CBC WITH PLATELETS AND DIFFERENTIAL - Abnormal; Notable for the following components:    RBC Count 4.38 (*)     Hemoglobin 12.9 (*)     All other components within normal limits   CRP INFLAMMATION - Normal   CBC WITH PLATELETS & DIFFERENTIAL    Narrative:     The following orders were created for panel order CBC with platelets differential.  Procedure                               Abnormality         Status                     ---------                               -----------         ------                     CBC with platelets and d...[[id number]]  Abnormal            Final result                 Please view results for these tests on the individual orders.         IMAGING (REVIEWED AND INTERPRETED):  US Lower Extremity Venous Duplex Right   Final Result   IMPRESSION:   1.  No deep venous thrombosis in the right lower extremity.      XR Foot Right G/E 3 Views   Final Result   IMPRESSION: No fractures are evident. Mild degenerative changes in multiple joints of the midfoot. Atheromatous calcification.      US AYANA Doppler No Exercise 1-2 Levels Bilateral    (Results Pending)   US Lower Extremity Arterial Duplex Right    (Results Pending)           I, Luis Barrett, am serving as a scribe to document services personally performed by Terence Chacko D.O., based on my observation and the provider s statements to me.    I, Terence Chacko D.O., attest that Luis Barrett is acting in a scribe capacity, has observed my performance of the services and has documented them in accordance with my direction.    Terence Chacko D.O.  EMERGENCY MEDICINE   08/20/21  [facility] EMERGENCY DEPARTMENT  [address] [place] [id number]  [phone]  Dept: [phone]     Terence Chacko DO  08/21/21 0125

## 2021-08-20 NOTE — ED TRIAGE NOTES
Patient is having right foot pain, densy injury.  Son with him says he gets this pain sometimes but today is more acute.  Patient is diabetic, no sores on his feet.

## 2021-08-21 NOTE — DISCHARGE INSTRUCTIONS
Take ibuprofen 400 mg every 8 hours and Tylenol 500mg every 8 hours for pain management.  Use a walker for ambulation assistance.  Follow-up with the [place] [facility] on Monday for reassessment.  If you have worsening pain that is not improved with Tylenol ibuprofen, discoloration of the foot either redness blueness of the skin, have difficulty ambulating despite use of walker and feel phosphorus return to the emergency department.

## 2021-08-21 NOTE — ED NOTES
Writer adjusted walker to patent's height and educated patient on use of walker to assist with ambulation per MD.

## 2021-08-21 NOTE — ED NOTES
Per Dr. Chacko, writer to auscultate with handheld doppler for right foot pedal pulse. Writer auscultated pedal pulse. Pulse heard normal.

## 2021-08-25 NOTE — PROGRESS NOTES
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  Comment: exam consistent w/ flare of midfoot arthritis; trial of nsaids; no risk factors for gout but would consider uric acid if he has another flare; would also get dedicated ankle xray as that joint was poorly visualized on foot xray in ED; will f/u in 2 weeks as he is overdue for DM checkup; precautions given  Plan: naproxen (NAPROSYN) 500 MG tablet          Miguel Orr MD  August 26, 2021  11:11 AM        Pt is a 88 year old male last seen on 3/8/2021 by Dr Gutierrez here for follow up of:     ED visit for foot pain  R Foot/Ankle pain:   Location: dorsum   Duration: 1 week ago -> started last week w/ some pain, no trauma; then two days later was swollen  Trauma/ Fall? No   Able to walk? YES - but very limited  Swelling? YES -> midfoot/ankle   Bruising? NO  Numbness/ Tingling? NO   Weakness? NO   Instability? NO  Snapping/Clicking? NO  Imaging? Xray in ED, U/S in ED, labs in ED (see below)   Treatment? Walker - used 1 day and was better; No meds    Now 75% better     Denies hx of gout  Denies change in diet -> no inc in steak or seafood; No inc in EtOH      Per ED visit on 8/21/2021:  Elie Martinez is a 88 year old male with relevant past history of insulin-dependent diabetes, hypertension, hyperlipidemia who presents to the emergency department for evaluation of right foot pain.     Patient describes the pain as sharp and aching initially intermittent now more constant.  Patient reports weeks to months of pain is localizing to the mid right foot on the dorsal aspect.  No known trauma.  Patient denies definitive swelling or erythema.  Patient has no known history of diabetic neuropathy.  He has not noted  sores on his feet.  Another review of systems he denied calf pain or swelling, fever, shortness of breath or palpitations.  Patient has no known heart disease.  Triage note reviewed:Patient is having right foot pain, harpal injury.  Son with him says he gets this  pain sometimes but today is more acute.  Patient is diabetic, no sores on his feet.   Differential  diagnosis  considered included but not limited to lower extremity ischemia or DVT, infection including osteomyelitis, other inflammatory process including gout, occult fracture, other diabetes related condition including neuropathy.  Musculoskeletal/skin.  Patient reports area of greatest tenderness at the proximal right midfoot.  There is slight quarter size discoloration overlying the navicular but not definitive erythema.  Skin is otherwise intact.  Right  ankle has full range of motion without effusion, right  knee without effusion, calf without apparent swelling or erythema  Diagnostic studies:  Imaging: Right foot x-ray: No fracture or bony abnormality  Ultrasound right lower extremity: No DVT  Lab: BC 9.9, CRP 0.7, sed rate 34, comprehensive metabolic profile within normal limits except for potassium 5.2 with normal renal function  Interventions: IV ketorolac.  Medical decision making: Patient had improvement in pain and currently minimal.  Patient has chronic gait instability and will prescribe walker for use at home for ambulation assistance.  To continue Tylenol and ibuprofen for pain.  Follow-up with primary care physician on Monday.  Frequent foot pain not improved with Tylenol ibuprofen rest any progressive symptoms including development of increased swelling erythema of the lower extremity will return to the emergency department.    Past Medical History:   Diagnosis Date     Cancer (H)     prostate     Cerebral vascular disease      Chronic kidney disease      Diabetes (H)      Hyperlipidemia      Hypertension       Current Outpatient Medications   Medication Sig Dispense Refill     aspirin 325 MG tablet Take 1 tablet (325 mg) by mouth daily 90 tablet 3     atorvastatin (LIPITOR) 40 MG tablet Take 1 tablet (40 mg) by mouth daily 90 tablet 3     blood glucose (FREESTYLE TEST STRIPS) test strip Use to test  "blood sugar 3 times daily or as directed. 300 strip 11     blood glucose (NO BRAND SPECIFIED) lancets standard Use to test blood sugar 3 times daily or as directed. 100 each 11     blood glucose (NO BRAND SPECIFIED) lancets standard Use to test blood sugar 3 times daily or as directed. 300 each 3     blood glucose (NO BRAND SPECIFIED) test strip Use to test blood sugars 3 times daily or as directed 300 each 3     blood glucose monitoring (FREESTYLE FREEDOM LITE) meter device kit Use to test blood sugar 3 times daily or as directed. 1 kit 0     blood glucose monitoring (NO BRAND SPECIFIED) meter device kit Use to test blood sugar 3 times daily or as directed. 1 kit 0     insulin aspart (NOVOLOG PEN) 100 UNIT/ML pen Inject 5 Units Subcutaneous 3 times daily (with meals) 6 mL 3     insulin glargine (LANTUS SOLOSTAR) 100 UNIT/ML pen Inject 40 Units Subcutaneous At Bedtime 9 mL 3     insulin pen needle (NOVOFINE) 32G X 6 MM miscellaneous Use with insulin pen four times daily or as directed 400 each 11     lisinopril (ZESTRIL) 10 MG tablet Take 1 tablet (10 mg) by mouth daily 90 tablet 0     metFORMIN (GLUCOPHAGE-XR) 500 MG 24 hr tablet Take 1 tablet (500 mg) by mouth daily with food 90 tablet 0     Multiple Vitamins-Minerals (ICAPS AREDS FORMULA) TABS Take 1 tablet by mouth 2 times daily        Allergies   Allergen Reactions     No Known Allergies       ROS:   Gen- no fevers/chills   Rheum - no morning stiffness   Derm - no rash/ redness   Neuro - no numbness, no tingling   Remainder of ROS negative.     Exam:   /71 (BP Location: Right arm, Patient Position: Sitting, Cuff Size: Adult Regular)   Pulse 67   Temp 97.3  F (36.3  C) (Oral)   Resp 18   Ht 1.825 m (5' 11.85\")   Wt 91.6 kg (202 lb)   SpO2 98%   BMI 27.51 kg/m        R Foot/Ankle:   Inspection: Swelling - YES - pitting over dorsum of foot to mid-shin; Bruising - NO   Sensation: intact in peroneal, tibial, sural, and saphenous distribution   ROM: " Dorsiflexion - limited; Plantarflexion - full; Inversion -limited; Eversion - limited;    Strength: 5/5 in all motions;   Bony tenderness: Medial malleolus? - NO; Lateral malleolus? - NO; Navicular? - YES; 5th Metatarsal? - NO; Other - dorsal anterior ankle and midfoot  Ligaments Tenderness: ATFL/CFL -NO; Deltoid - NO; Syndesmosis - NO; LisFranc - NO  Tendons: Posterior Tibialis - NO; Peroneals - NO; Achilles - NO   Maneuvers: deferred

## 2021-08-26 ENCOUNTER — OFFICE VISIT (OUTPATIENT)
Dept: FAMILY MEDICINE | Facility: CLINIC | Age: 86
End: 2021-08-26
Payer: MEDICARE

## 2021-08-26 VITALS
RESPIRATION RATE: 18 BRPM | DIASTOLIC BLOOD PRESSURE: 71 MMHG | BODY MASS INDEX: 27.36 KG/M2 | SYSTOLIC BLOOD PRESSURE: 137 MMHG | OXYGEN SATURATION: 98 % | TEMPERATURE: 97.3 F | HEART RATE: 67 BPM | HEIGHT: 72 IN | WEIGHT: 202 LBS

## 2021-08-26 DIAGNOSIS — M19.079 ARTHRITIS OF MIDFOOT: Primary | ICD-10-CM

## 2021-08-26 PROCEDURE — 99213 OFFICE O/P EST LOW 20 MIN: CPT | Performed by: FAMILY MEDICINE

## 2021-08-26 RX ORDER — NAPROXEN 500 MG/1
500 TABLET ORAL 2 TIMES DAILY WITH MEALS
Qty: 28 TABLET | Refills: 1 | Status: SHIPPED | OUTPATIENT
Start: 2021-08-26 | End: 2021-09-13

## 2021-08-26 ASSESSMENT — MIFFLIN-ST. JEOR: SCORE: 1621.89

## 2021-08-26 NOTE — Clinical Note
Just NATALYA. Not sure how busy your schedule is. I was going to see him for DM follow-up in a couple weeks but obviously defer to you if you would want to see him instead. - Miguel

## 2021-09-13 ENCOUNTER — OFFICE VISIT (OUTPATIENT)
Dept: FAMILY MEDICINE | Facility: CLINIC | Age: 86
End: 2021-09-13
Payer: MEDICARE

## 2021-09-13 VITALS
TEMPERATURE: 98 F | BODY MASS INDEX: 27.5 KG/M2 | RESPIRATION RATE: 16 BRPM | HEIGHT: 72 IN | HEART RATE: 74 BPM | SYSTOLIC BLOOD PRESSURE: 126 MMHG | OXYGEN SATURATION: 97 % | DIASTOLIC BLOOD PRESSURE: 73 MMHG | WEIGHT: 203 LBS

## 2021-09-13 DIAGNOSIS — N18.31 STAGE 3A CHRONIC KIDNEY DISEASE (H): ICD-10-CM

## 2021-09-13 DIAGNOSIS — E11.65 TYPE 2 DIABETES MELLITUS WITH HYPERGLYCEMIA, WITHOUT LONG-TERM CURRENT USE OF INSULIN (H): Primary | ICD-10-CM

## 2021-09-13 DIAGNOSIS — I67.9 CEREBRAL VASCULAR DISEASE: ICD-10-CM

## 2021-09-13 DIAGNOSIS — I10 ESSENTIAL HYPERTENSION: ICD-10-CM

## 2021-09-13 DIAGNOSIS — Z23 NEED FOR PROPHYLACTIC VACCINATION AND INOCULATION AGAINST INFLUENZA: ICD-10-CM

## 2021-09-13 LAB
ANION GAP SERPL CALCULATED.3IONS-SCNC: 10 MMOL/L (ref 3–14)
BUN SERPL-MCNC: 16 MG/DL (ref 7–30)
CALCIUM SERPL-MCNC: 9.5 MG/DL (ref 8.5–10.1)
CHLORIDE BLD-SCNC: 107 MMOL/L
CHOLEST SERPL-MCNC: 125 MG/DL
CO2 SERPL-SCNC: 23 MMOL/L (ref 20–32)
CREAT SERPL-MCNC: 1.1 MG/DL
FASTING STATUS PATIENT QL REPORTED: NO
GFR SERPL CREATININE-BSD FRML MDRD: 59 ML/MIN/1.73M2
GLUCOSE BLD-MCNC: 145 MG/DL (ref 70–99)
HBA1C MFR BLD: 7.1 % (ref 0–5.6)
HDLC SERPL-MCNC: 41 MG/DL
LDLC SERPL CALC-MCNC: 61 MG/DL
POTASSIUM BLD-SCNC: 5 MMOL/L (ref 3.4–5.3)
SODIUM SERPL-SCNC: 140 MMOL/L (ref 133–144)
TRIGL SERPL-MCNC: 113 MG/DL

## 2021-09-13 PROCEDURE — 82043 UR ALBUMIN QUANTITATIVE: CPT | Performed by: FAMILY MEDICINE

## 2021-09-13 PROCEDURE — 80061 LIPID PANEL: CPT | Performed by: FAMILY MEDICINE

## 2021-09-13 PROCEDURE — 83036 HEMOGLOBIN GLYCOSYLATED A1C: CPT | Performed by: FAMILY MEDICINE

## 2021-09-13 PROCEDURE — G0008 ADMIN INFLUENZA VIRUS VAC: HCPCS | Performed by: FAMILY MEDICINE

## 2021-09-13 PROCEDURE — 90662 IIV NO PRSV INCREASED AG IM: CPT | Performed by: FAMILY MEDICINE

## 2021-09-13 PROCEDURE — 80048 BASIC METABOLIC PNL TOTAL CA: CPT | Performed by: FAMILY MEDICINE

## 2021-09-13 PROCEDURE — 36415 COLL VENOUS BLD VENIPUNCTURE: CPT | Performed by: FAMILY MEDICINE

## 2021-09-13 PROCEDURE — 99214 OFFICE O/P EST MOD 30 MIN: CPT | Mod: 25 | Performed by: FAMILY MEDICINE

## 2021-09-13 ASSESSMENT — MIFFLIN-ST. JEOR: SCORE: 1624.55

## 2021-09-13 NOTE — PROGRESS NOTES
HPI:   Elie Martinez is a 89 year old  male who presents for:    Chief Complaint   Patient presents with     RECHECK     diabetes     Medication Reconciliation     completed     Imm/Inj     Flu shot     89-year-old presents to clinic for chronic disease management    Type 2 diabetes mellitus: Continues to check glucoses before each meal.  No hypoglycemic symptoms or readings under 80.  Continues on Lantus 40 units daily, NovoLog meal insulin 5 units 3 times daily with meals, and Metformin 500 mg once a day.  In the past he was tried alternative medications and had difficulty with widely variable glucoses.  This current regimen has been stable and effective for some time.  He is able to manage his medications independently, although his daughter who is here with him today does check on him frequently and helps with medication monitoring.  His daughter is knowledgeable in diabetes care.  No new vision changes.  No new paresthesias.  No change in urinary frequency.    Hypertension: No dizziness or lightheadedness.  No falls.  No chest pain episodes.  Continues on lisinopril 10 mg daily.  Lisinopril dose was decreased from 20 mg to 10 mg last year due to relative hypotension.    Cerebrovascular disease: Continues on aspirin and atorvastatin 40 mg daily.  No new muscle aches.  No bleeding.  Continues to bruise easily which is unchanged for years.           PMHX:     Patient Active Problem List   Diagnosis     Long-term insulin use in type 2 diabetes (H)     Cerebral vascular disease     S/P carotid endarterectomy     Hypertension     Prostate cancer (H)     Hyperlipidemia LDL goal <100     Chronic kidney disease, stage 3a     Type 2 diabetes mellitus with hyperglycemia, with long-term current use of insulin (H)       Current Outpatient Medications   Medication Sig Dispense Refill     aspirin 325 MG tablet Take 1 tablet (325 mg) by mouth daily 90 tablet 3     atorvastatin (LIPITOR) 40 MG tablet Take 1 tablet (40  mg) by mouth daily 90 tablet 3     blood glucose (FREESTYLE TEST STRIPS) test strip Use to test blood sugar 3 times daily or as directed. 300 strip 11     blood glucose (NO BRAND SPECIFIED) lancets standard Use to test blood sugar 3 times daily or as directed. 100 each 11     blood glucose (NO BRAND SPECIFIED) lancets standard Use to test blood sugar 3 times daily or as directed. 300 each 3     blood glucose (NO BRAND SPECIFIED) test strip Use to test blood sugars 3 times daily or as directed 300 each 3     blood glucose monitoring (FREESTYLE FREEDOM LITE) meter device kit Use to test blood sugar 3 times daily or as directed. 1 kit 0     blood glucose monitoring (NO BRAND SPECIFIED) meter device kit Use to test blood sugar 3 times daily or as directed. 1 kit 0     insulin aspart (NOVOLOG PEN) 100 UNIT/ML pen Inject 5 Units Subcutaneous 3 times daily (with meals) 6 mL 3     insulin glargine (LANTUS SOLOSTAR) 100 UNIT/ML pen Inject 40 Units Subcutaneous At Bedtime 9 mL 3     insulin pen needle (NOVOFINE) 32G X 6 MM miscellaneous Use with insulin pen four times daily or as directed 400 each 11     lisinopril (ZESTRIL) 10 MG tablet Take 1 tablet (10 mg) by mouth daily 90 tablet 0     metFORMIN (GLUCOPHAGE-XR) 500 MG 24 hr tablet Take 1 tablet (500 mg) by mouth daily with food 90 tablet 0     Multiple Vitamins-Minerals (ICAPS AREDS FORMULA) TABS Take 1 tablet by mouth 2 times daily         Social History     Tobacco Use     Smoking status: Former Smoker     Types: Cigarettes     Quit date: 1994     Years since quittin.3     Smokeless tobacco: Former User     Quit date: 1952   Substance Use Topics     Alcohol use: Yes     Alcohol/week: 0.0 standard drinks     Comment: 2 beers/week     Drug use: No       Social History     Social History Narrative     Not on file       Allergies   Allergen Reactions     No Known Allergies        No results found for this or any previous visit (from the past 24 hour(s)).       "   Review of Systems:     General: No recent illness.  Feels his weight is stable.  ENT: No upper respiratory symptoms  Cardiovascular: No chest pain episodes.  No palpitations  Respiratory: No cough or shortness of breat  GI: No appetite changes.    Musculoskeletal: Had right foot pain in late August prompting the ER visit.  This is essentially resolved         Physical Exam:     Vitals:    09/13/21 1348 09/13/21 1351   BP: (!) 150/73 126/73   BP Location: Right arm Right arm   Patient Position: Sitting Sitting   Cuff Size: Adult Regular Adult Regular   Pulse: 74    Resp: 16    Temp: 98  F (36.7  C)    TempSrc: Oral    SpO2: 97%    Weight: 92.1 kg (203 lb)    Height: 1.83 m (6' 0.05\")      Body mass index is 27.5 kg/m .    General: Alert,  n no acute distress  HEENT: Head is free of trauma.   Sclerae non-icteric. PERRL, Moist oral mucus membranes, no tonsilar hypertrophy or exudate. TM's white with normal bony and light landmarks.  Resp: Fair air exchange bilaterally.  Clear to auscultation bilaterally  CV: Regular rate and rhythm  Abd: Soft, non-tender.  Ext: Warm.  Right foot is normal.  No tenderness over bony landmarks.  Skin: exposed skin free of rash  Psych: Mood appropriate     Results for orders placed or performed in visit on 09/13/21   Lipid Profile     Status: None   Result Value Ref Range    Cholesterol 125 <=199 mg/dL    Triglycerides 113 <=149 mg/dL    Direct Measure HDL 41 >=40 mg/dL    LDL Cholesterol Calculated 61 <=129 mg/dL    Patient Fasting > 8hrs? No    Hemoglobin A1c     Status: Abnormal   Result Value Ref Range    Hemoglobin A1C 7.1 (H) 0.0 - 5.6 %   Basic metabolic panel     Status: Abnormal   Result Value Ref Range    Sodium 140 133 - 144 mmol/L    Potassium 5.0 3.4 - 5.3 mmol/L    Chloride 107 mmol/L    Carbon Dioxide (CO2) 23 20 - 32 mmol/L    Anion Gap 10 3 - 14 mmol/L    Urea Nitrogen 16 7 - 30 mg/dL    Creatinine 1.10 mg/dL    Calcium 9.5 8.5 - 10.1 mg/dL    Glucose 145 (H) 70 - 99 " mg/dL    GFR Estimate 59 (L) >60 mL/min/1.73m2   Albumin Random Urine Quantitative with Creat Ratio     Status: Abnormal   Result Value Ref Range    Microalbumin Urine mg/dL 5.69 (H) 0.00 - 1.99 mg/dL    Creatinine Urine mg/dL 199 mg/dL    Microalbumin Urine mg/g Cr 28.6 (H) <=19.9 mg/g Cr    Narrative    Microalbumin, Random Urine   <2.0 mg/dL . . . . . . . . Normal   3.0-30.0 mg/dL . . . . . . Microalbuminuria   >30.0 mg/dL . . . . . .  . Clinical Proteinuria     Microalbumin/Creatinine Ratio, Random Urine   <20 mg/g . . . . .. . . . Normal    mg/g . . . . . . . Microalbuminuria   >300 mg/g . . . . . . . . Clinical Proteinuria       Assessment and Plan   1. Need for prophylactic vaccination and inoculation against influenza  Influenza vaccine given today  - INFLUENZA, QUAD, HIGH DOSE, PF, 65YR + (FLUZONE HD)    2. Type 2 diabetes mellitus with hyperglycemia, without long-term current use of insulin (H)  A1c today at goal, 7.1%  Goal 8.5% or less  in light of his age and coexisting conditions  Monitor closely for hypoglycemia  Attempts at discontinuing meal insulin and alternative hypoglycemic agents in the past have led to labile glucoses, as well as cost concerns  Continue current insulin regimen, daughter is helping monitor and if medication management becomes an issue may need to reconsider discontinuing meal insulin        - Lipid Profile; Future  - Hemoglobin A1c; Future  - Albumin Random Urine Quantitative with Creat Ratio; Future    3. Stage 3a chronic kidney disease  Stable  Repeat basic metabolic profile in 6 months or with medication changes  - Basic metabolic panel; Future    4. Essential hypertension  Well-controlled  Goal 140/90 or less in light of his cerebrovascular and kidney disease  His lisinopril dose was reduced from 20 to 10 mg daily last year    5. Cerebral vascular disease  Continue aspirin and statin secondary prophylaxis  Monitor for any new symptoms and seek immediate emergent  evaluation for neurologic symptoms    6. Cognitive decline of aging:  He and his daughter noticed some short-term memory difficulty over the past couple of years.  He and his daughter do not note any significant change over the past 6 months.  Manages medications independently.  He maintains his home mostly independently.  His daughter helps with finances.  Not interested in medications for memory.  Taking a vitamin B supplement.  Continue to monitor at each visit.    Follow up: 4 months  Options for treatment and follow-up care were reviewed with the patient and/or guardian. Elie Martinez and/or guardian engaged in the decision making process and verbalized understanding of the options discussed and agreed with the final plan.    Jhon Gutierrez MD  Faculty - [facility] [place] [facility]

## 2021-09-14 LAB
CREAT UR-MCNC: 199 MG/DL
MICROALBUMIN UR-MCNC: 5.69 MG/DL (ref 0–1.99)
MICROALBUMIN/CREAT UR: 28.6 MG/G CR

## 2021-09-16 DIAGNOSIS — E11.65 TYPE 2 DIABETES MELLITUS WITH HYPERGLYCEMIA, WITH LONG-TERM CURRENT USE OF INSULIN (H): ICD-10-CM

## 2021-09-16 DIAGNOSIS — Z79.4 TYPE 2 DIABETES MELLITUS WITH HYPERGLYCEMIA, WITH LONG-TERM CURRENT USE OF INSULIN (H): ICD-10-CM

## 2021-09-16 RX ORDER — METFORMIN HCL 500 MG
500 TABLET, EXTENDED RELEASE 24 HR ORAL
Qty: 90 TABLET | Refills: 3 | Status: SHIPPED | OUTPATIENT
Start: 2021-09-16 | End: 2022-05-23

## 2021-09-16 NOTE — TELEPHONE ENCOUNTER
[facility] phone call message- patient requesting a refill:    Full Medication Name:   metFORMIN (GLUCOPHAGE-XR) 500 MG 24 hr tablet    Dose:   Sig - Route: Take 1 tablet (500 mg) by mouth daily with food - Oral    Pharmacy confirmed as   [facility] - [place], [place] - [address] [place] [id number]  Phone: [phone] Fax: [phone]  : Yes    Additional Comments: PT called in requesting a refill for the above medication       OK to leave a message on voice mail? Yes    Primary language: English      needed? No    Call taken on September 16, 2021 at 2:20 PM by Jessie Oleary

## 2021-09-18 NOTE — RESULT ENCOUNTER NOTE
Your A1c is 7.1% reflecting excellent blood sugar control.  Your goal A1c is 8.0% or less.  You cholesterol is well controlled.  Your electrolytes are normal.  Your urine protein test shows a small amount of protein as expected, which is sign of kidney changes due to diabetes.  Your kidney function is stable, and actually slightly improved compared to 6 months ago.  Continue your current medications to protect from complications of diabetes and high blood pressure.  Return to see Dr. Gutierrez no later than January, earlier as needed

## 2021-10-23 NOTE — TELEPHONE ENCOUNTER
[facility] phone call message- general phone call:    Reason for call: BS readings (6 units of novolog 3/day, 24 units of insulin in the evening)  7/3/18 at 197 in am   207 at 2pm          229 in the evening  7/4/18 at 239 in am   190 at 2-3pm       179 in evening  7/5/18 at 147 in am   165 at 3pm          224 in evening  7/6/18 at 199 in am   172 in afternoon   228 in evening  7/7/18 at 171 in am   169 in afternoon    219 in evening  7/8/18 at 174 in am    190 in afternoon    201 in evening  7/9/18 at 209 in am    Also states that he is almost out of his novafine 32g needles, has about 3-4 days worth left.       Return call needed: Yes    OK to leave a message on voice mail? Yes    Primary language: English      needed? No    Call taken on July 9, 2018 at 10:56 AM by Cherie Linares    
Patient initiated meal insulin 6/26, Novolog 5units 3 times daily.  Dose of novolog increased to 6units 3 times daily by DM education 7/2.  Continues on Lantus 24 units once daily.    Glucoses reviewed today.  Post prandials at goal. Fastings near goal.  Keep insulin dose same for now to avoid hypoglycemia risk.    Will follow up with DM educator in about 2 weeks for review of glucoses and further insulin titration if needed.    A1c goal 8.5% or less      DAISHA Gutierrez  
from [facility]- receives iron infusions-- c/o pain lower back following infusion.  pain increases inspiration. / pmh- pe

## 2021-12-15 DIAGNOSIS — I67.9 CEREBRAL VASCULAR DISEASE: ICD-10-CM

## 2021-12-15 DIAGNOSIS — I10 ESSENTIAL HYPERTENSION: ICD-10-CM

## 2021-12-15 RX ORDER — ATORVASTATIN CALCIUM 40 MG/1
40 TABLET, FILM COATED ORAL DAILY
Qty: 90 TABLET | Refills: 3 | Status: SHIPPED | OUTPATIENT
Start: 2021-12-15 | End: 2022-05-23

## 2021-12-15 RX ORDER — LISINOPRIL 10 MG/1
10 TABLET ORAL DAILY
Qty: 90 TABLET | Refills: 3 | Status: SHIPPED | OUTPATIENT
Start: 2021-12-15 | End: 2022-05-23

## 2021-12-15 NOTE — TELEPHONE ENCOUNTER
[facility] phone call message- medication clarification/question:    Full Medication Name: lisinopril (ZESTRIL) & atorvastatin (LIPITOR)   Dose: 10 mg tablet & 40 mg tablet    Question: Patient confirming if lisinopril is 20 mg tablet or 10 mg tablet due to one in patient chart it shows 10 mg tablet but patient has old bottle saying 20 mg tablet. Please send correct dosage refill for patient .     Pharmacy confirmed as    [facility] - [address]: Yes    OK to leave a message on voice mail? Yes    Primary language: English      needed? No    Call taken on December 15, 2021 at 10:56 AM by Yoli Salamanca

## 2022-01-29 NOTE — TELEPHONE ENCOUNTER
Patient recently saw DM educator.  At that more recent visit, fasting glucoses have improved further (140s-180s).    Lantus dose currently at 22 units.    Patient is scheduled to f/u with me in clinic on 6/25 for further DM management.    Kendall   unable to perform

## 2022-02-23 DIAGNOSIS — E11.65 TYPE 2 DIABETES MELLITUS WITH HYPERGLYCEMIA, WITHOUT LONG-TERM CURRENT USE OF INSULIN (H): ICD-10-CM

## 2022-02-23 RX ORDER — INSULIN GLARGINE 100 [IU]/ML
40 INJECTION, SOLUTION SUBCUTANEOUS AT BEDTIME
Qty: 15 ML | Refills: 0 | Status: SHIPPED | OUTPATIENT
Start: 2022-02-23 | End: 2022-02-28

## 2022-02-23 NOTE — TELEPHONE ENCOUNTER
Message to physician:     Date of last visit: 9/13/2021    Date of next visit if scheduled: none    Potassium   Date Value Ref Range Status   09/13/2021 5.0 3.4 - 5.3 mmol/L Final   03/08/2021 5.0 3.5 - 5.0 mmol/L Final     Creatinine   Date Value Ref Range Status   09/13/2021 1.10 mg/dL Final   03/08/2021 1.54 (H) 0.70 - 1.30 mg/dL Final     GFR Estimate   Date Value Ref Range Status   09/13/2021 59 (L) >60 mL/min/1.73m2 Final     Comment:     As of July 11, 2021, eGFR is calculated by the CKD-EPI creatinine equation, without race adjustment. eGFR can be influenced by muscle mass, exercise, and diet. The reported eGFR is an estimation only and is only applicable if the renal function is stable.   03/08/2021 43 (L) >60 mL/min/1.73m2 Final       BP Readings from Last 3 Encounters:   09/13/21 126/73   08/26/21 137/71   08/20/21 (!) 152/70       Hemoglobin A1C POCT   Date Value Ref Range Status   03/08/2021 8.2 (H) 4.1 - 5.7 % Final     Hemoglobin A1C   Date Value Ref Range Status   09/13/2021 7.1 (H) 0.0 - 5.6 % Final     Comment:     Normal <5.7%   Prediabetes 5.7-6.4%    Diabetes 6.5% or higher     Note: Adopted from ADA consensus guidelines.       Please complete refill and CLOSE ENCOUNTER.  Closing the encounter signifies the refill is complete.

## 2022-02-23 NOTE — TELEPHONE ENCOUNTER
[facility] phone call message- patient requesting a refill:    Full Medication Name: insulin glargine (LANTUS SOLOSTAR) 100 UNIT/ML pen    [facility]  [id number] [facility] [id number]    Pharmacy confirmed as : Yes    Additional Comments: Patient stated he requested refill in clinic on 1/13 but has not received medication yet. Patient has been out for awhile now.     OK to leave a message on voice mail? Yes    Primary language: English      needed? No    Call taken on February 23, 2022 at 4:01 PM by Lobito Barney

## 2022-02-28 ENCOUNTER — OFFICE VISIT (OUTPATIENT)
Dept: FAMILY MEDICINE | Facility: CLINIC | Age: 87
End: 2022-02-28
Payer: MEDICARE

## 2022-02-28 VITALS
DIASTOLIC BLOOD PRESSURE: 79 MMHG | OXYGEN SATURATION: 96 % | WEIGHT: 199.5 LBS | SYSTOLIC BLOOD PRESSURE: 126 MMHG | HEIGHT: 72 IN | HEART RATE: 97 BPM | TEMPERATURE: 97.9 F | BODY MASS INDEX: 27.02 KG/M2 | RESPIRATION RATE: 16 BRPM

## 2022-02-28 DIAGNOSIS — E11.22 TYPE 2 DIABETES MELLITUS WITH STAGE 2 CHRONIC KIDNEY DISEASE, WITH LONG-TERM CURRENT USE OF INSULIN (H): Primary | ICD-10-CM

## 2022-02-28 DIAGNOSIS — Z79.4 TYPE 2 DIABETES MELLITUS WITH HYPERGLYCEMIA, WITH LONG-TERM CURRENT USE OF INSULIN (H): ICD-10-CM

## 2022-02-28 DIAGNOSIS — I10 PRIMARY HYPERTENSION: ICD-10-CM

## 2022-02-28 DIAGNOSIS — Z79.4 TYPE 2 DIABETES MELLITUS WITH STAGE 2 CHRONIC KIDNEY DISEASE, WITH LONG-TERM CURRENT USE OF INSULIN (H): Primary | ICD-10-CM

## 2022-02-28 DIAGNOSIS — N18.31 CHRONIC KIDNEY DISEASE, STAGE 3A (H): ICD-10-CM

## 2022-02-28 DIAGNOSIS — E11.65 TYPE 2 DIABETES MELLITUS WITH HYPERGLYCEMIA, WITHOUT LONG-TERM CURRENT USE OF INSULIN (H): ICD-10-CM

## 2022-02-28 DIAGNOSIS — E11.65 TYPE 2 DIABETES MELLITUS WITH HYPERGLYCEMIA, WITH LONG-TERM CURRENT USE OF INSULIN (H): ICD-10-CM

## 2022-02-28 DIAGNOSIS — N18.2 TYPE 2 DIABETES MELLITUS WITH STAGE 2 CHRONIC KIDNEY DISEASE, WITH LONG-TERM CURRENT USE OF INSULIN (H): Primary | ICD-10-CM

## 2022-02-28 DIAGNOSIS — I67.9 CEREBRAL VASCULAR DISEASE: ICD-10-CM

## 2022-02-28 LAB
ANION GAP SERPL CALCULATED.3IONS-SCNC: 15 MMOL/L (ref 3–14)
BUN SERPL-MCNC: 22 MG/DL (ref 7–30)
CALCIUM SERPL-MCNC: 10.4 MG/DL (ref 8.5–10.1)
CHLORIDE BLD-SCNC: 101 MMOL/L
CO2 SERPL-SCNC: 25 MMOL/L (ref 20–32)
CREAT SERPL-MCNC: 1.3 MG/DL
GFR SERPL CREATININE-BSD FRML MDRD: 53 ML/MIN/1.73M2
GLUCOSE BLD-MCNC: 393 MG/DL (ref 70–99)
HBA1C MFR BLD: 9.5 % (ref 0–5.6)
POTASSIUM BLD-SCNC: 5.1 MMOL/L (ref 3.4–5.3)
SODIUM SERPL-SCNC: 141 MMOL/L (ref 133–144)

## 2022-02-28 PROCEDURE — 36415 COLL VENOUS BLD VENIPUNCTURE: CPT | Performed by: FAMILY MEDICINE

## 2022-02-28 PROCEDURE — 83036 HEMOGLOBIN GLYCOSYLATED A1C: CPT | Performed by: FAMILY MEDICINE

## 2022-02-28 PROCEDURE — 99214 OFFICE O/P EST MOD 30 MIN: CPT | Performed by: FAMILY MEDICINE

## 2022-02-28 PROCEDURE — 80048 BASIC METABOLIC PNL TOTAL CA: CPT | Performed by: FAMILY MEDICINE

## 2022-02-28 RX ORDER — BLOOD SUGAR DIAGNOSTIC
STRIP MISCELLANEOUS
Qty: 300 STRIP | Refills: 11 | Status: SHIPPED | OUTPATIENT
Start: 2022-02-28 | End: 2023-06-26

## 2022-02-28 RX ORDER — INSULIN GLARGINE 100 [IU]/ML
35 INJECTION, SOLUTION SUBCUTANEOUS AT BEDTIME
Qty: 15 ML | Refills: 0
Start: 2022-02-28 | End: 2022-03-28

## 2022-02-28 NOTE — PROGRESS NOTES
"       HPI:   Elie Martinez is a 89 year old  male who presents for:    Chief Complaint   Patient presents with     Diabetes     follow-up     Medication Reconciliation     completed       Type 2 diabetes mellitus: Is now only checking blood sugars about once a day.  Logs his glucoses, but does not remember to check glucoses as often as he intends.  Mostly checking in the AM fasting, 110-125 most days.   No hypoglycemic symptoms or readings under 100 recently.    Still taking Metformin 500mg per day, dose limited by loose stools in past.  Uses a note near his insulin pens to remind him of the dose.  Lantus 40 units daily in the evening.   NovoLog meal insulin 5 units 3 times daily with meals, leaves near his main chair where he takes his meals.  Does his own cooking - heats up meals his children prep for him.  Likes to make ron and eggs once a week.   In the past he was tried alternative medications and had difficulty with widely variable glucoses. He continues to manage his medications independently, although his daughter who is here with him today does check on him 2-3 times per week. Son \"Favian\" also checks on him a few times per week as well, and helps with medication monitoring.  His daughter is knowledgeable in diabetes care.  No new vision changes.  No new paresthesias.  No change in urinary frequency.     Hypertension: No dizziness or lightheadedness.  No falls.  No chest pain episodes.    Continues on lisinopril 10 mg daily.   Checks at home once in a while. Never over 130's.    Cerebrovascular disease: Continues on aspirin and atorvastatin 40 mg daily.  No new muscle aches or weakness.  No bleeding.    Bruises easily which is unchanged for years.    Stage 3a CKD: last Cr 1.1, GFR 59 on 9/13/21           PMHX:     Patient Active Problem List   Diagnosis     Long-term insulin use in type 2 diabetes (H)     Cerebral vascular disease     S/P carotid endarterectomy     Hypertension     Prostate cancer (H)     " Hyperlipidemia LDL goal <100     Chronic kidney disease, stage 3a (H)     Type 2 diabetes mellitus with hyperglycemia, with long-term current use of insulin (H)       Current Outpatient Medications   Medication Sig Dispense Refill     aspirin 325 MG tablet Take 1 tablet (325 mg) by mouth daily 90 tablet 3     atorvastatin (LIPITOR) 40 MG tablet Take 1 tablet (40 mg) by mouth daily 90 tablet 3     blood glucose (FREESTYLE TEST STRIPS) test strip Use to test blood sugar 3 times daily or as directed. 300 strip 11     blood glucose (NO BRAND SPECIFIED) lancets standard Use to test blood sugar 3 times daily or as directed. 100 each 11     blood glucose (NO BRAND SPECIFIED) lancets standard Use to test blood sugar 3 times daily or as directed. 300 each 3     blood glucose (NO BRAND SPECIFIED) test strip Use to test blood sugars 3 times daily or as directed 300 each 3     blood glucose monitoring (NO BRAND SPECIFIED) meter device kit Use to test blood sugar 3 times daily or as directed. 1 kit 0     empagliflozin (JARDIANCE) 10 MG TABS tablet Take 1 tablet (10 mg) by mouth daily 30 tablet 0     insulin glargine (LANTUS SOLOSTAR) 100 UNIT/ML pen Inject 35 Units Subcutaneous At Bedtime 15 mL 0     insulin pen needle (NOVOFINE) 32G X 6 MM miscellaneous Use with insulin pen four times daily or as directed 400 each 11     lisinopril (ZESTRIL) 10 MG tablet Take 1 tablet (10 mg) by mouth daily 90 tablet 3     metFORMIN (GLUCOPHAGE-XR) 500 MG 24 hr tablet Take 1 tablet (500 mg) by mouth daily with food 90 tablet 3     Multiple Vitamins-Minerals (ICAPS AREDS FORMULA) TABS Take 1 tablet by mouth 2 times daily       blood glucose monitoring (FREESTYLE FREEDOM LITE) meter device kit Use to test blood sugar 3 times daily or as directed. 1 kit 0       Social History     Tobacco Use     Smoking status: Former Smoker     Types: Cigarettes     Quit date: 1994     Years since quittin.8     Smokeless tobacco: Former User     Quit  "date: 5/21/1952   Substance Use Topics     Alcohol use: Yes     Alcohol/week: 0.0 standard drinks     Comment: 2 beers/week     Drug use: No       Social History     Social History Narrative     Not on file       Allergies   Allergen Reactions     No Known Allergies               Review of Systems:     General: No fevers  ENT: No upper respiratory symptoms  Cardiovascular: No chest pain episodes  Respiratory: No cough  Skin: No ulceration or skin changes           Physical Exam:     Vitals:    02/28/22 1034   BP: 126/79   BP Location: Right arm   Cuff Size: Adult Regular   Pulse: 97   Resp: 16   Temp: 97.9  F (36.6  C)   TempSrc: Oral   SpO2: 96%   Weight: 90.5 kg (199 lb 8 oz)   Height: 1.825 m (5' 11.85\")     Body mass index is 27.17 kg/m .    General: Alert, male in no acute distress  HEENT: Head is free of trauma.   Sclerae non-icteric, Moist oral mucus membranes.  Resp: Clear to auscultation bilaterally  CV: Regular rate and rhythm.  1/6 systolic murmur at the apex  Abd: Soft, non-tender.  Ext: Warm trace peripheral edema.    Neurologic: Recalls 1 of 3 items after distraction.  Can recall 3 of 3 with prompting.    Results for orders placed or performed in visit on 02/28/22   Hemoglobin A1c     Status: Abnormal   Result Value Ref Range    Hemoglobin A1C 9.5 (H) 0.0 - 5.6 %   Basic metabolic panel     Status: Abnormal   Result Value Ref Range    Sodium 141 133 - 144 mmol/L    Potassium 5.1 3.4 - 5.3 mmol/L    Chloride 101 mmol/L    Carbon Dioxide (CO2) 25 20 - 32 mmol/L    Anion Gap 15 (H) 3 - 14 mmol/L    Urea Nitrogen 22 7 - 30 mg/dL    Creatinine 1.30 mg/dL    Calcium 10.4 (H) 8.5 - 10.1 mg/dL    Glucose 393 (H) 70 - 99 mg/dL    GFR Estimate 53 (L) >60 mL/min/1.73m2         Assessment and Plan   1. Type 2 diabetes mellitus with hyperglycemia, with long-term current use of insulin (H)  A1c is 9.5%, goal 8.5% or less    Add empagliflozin 10 mg daily.  We will try to reduce his dependence on insulin, discontinue " meal insulin  Continue Lantus insulin for now, but at a reduced dose of 35 units daily    Check blood glucoses at least twice daily in the morning, and then the afternoon or before bed    Would consider continuous glucose monitoring in the future, as he is having increasing difficulty checking and logging his own blood sugars with a glucometer, and continues to have a risk of hypoglycemia with his current medication regimen.    Follow-up in 1 month with glucometer, to adjust Jardiance dose, and consider a continuous glucose monitoring    - Hemoglobin A1c; Future  - Hemoglobin A1c  - blood glucose (FREESTYLE TEST STRIPS) test strip; Use to test blood sugar 3 times daily or as directed.  Dispense: 300 strip; Refill: 11  - blood glucose (NO BRAND SPECIFIED) lancets standard; Use to test blood sugar 3 times daily or as directed.  Dispense: 100 each; Refill: 11  - Basic metabolic panel; Future  - empagliflozin (JARDIANCE) 10 MG TABS tablet; Take 1 tablet (10 mg) by mouth daily  Dispense: 30 tablet; Refill: 0  - Basic metabolic panel    2. Type 2 diabetes mellitus with stage 3a chronic kidney disease, with long-term current use of insulin (H)  As above    - insulin pen needle (NOVOFINE) 32G X 6 MM miscellaneous; Use with insulin pen four times daily or as directed  Dispense: 400 each; Refill: 11    3. Type 2 diabetes mellitus with hyperglycemia, without long-term current use of insulin (H)  As above  - insulin glargine (LANTUS SOLOSTAR) 100 UNIT/ML pen; Inject 35 Units Subcutaneous At Bedtime  Dispense: 15 mL; Refill: 0    4. Primary hypertension  Well-controlled  Continue current medication regimen  Basic metabolic profile today    5. Chronic kidney disease, stage 3a (H)  Estimated GFR today is 53  Stable  Continue medical management  - Basic metabolic panel; Future  - Basic metabolic panel    6. Cerebral vascular disease  No new symptoms  Continue secondary prophylaxis, hypertension, and glycemic control      Follow up:  1 month, earlier as needed  Options for treatment and follow-up care were reviewed with the patient and/or guardian. Elie Martinez and/or guardian engaged in the decision making process and verbalized understanding of the options discussed and agreed with the final plan.    Jhon Gutierrez MD  Faculty - [facility]                      1/6 systolic murmur at apex    1 of 3 recall, 3/3 with prompting

## 2022-02-28 NOTE — LETTER
March 1, 2022      Juarez Martinez  [address]   [facility] [id number]        Dear ,    We are writing to inform you of your test results.  As discussed in clinic, your blood sugar average (hemoglobin A1c) is 9.5%, above your goal of 8.0% or less.  Your diabetes medications have been changed at your visit today.     As discussed by telephone:     Your kidney function is stable.   Your calcium level is slightly high, this should be rechecked with a blood test when your return to clinic for your diabetes in 4 weeks.     You have an appointment with Dr. Gutierrez scheduled for Monday March 28th       Resulted Orders   Hemoglobin A1c   Result Value Ref Range    Hemoglobin A1C 9.5 (H) 0.0 - 5.6 %      Comment:      Normal <5.7%   Prediabetes 5.7-6.4%    Diabetes 6.5% or higher     Note: Adopted from ADA consensus guidelines.   Basic metabolic panel   Result Value Ref Range    Sodium 141 133 - 144 mmol/L    Potassium 5.1 3.4 - 5.3 mmol/L    Chloride 101 mmol/L    Carbon Dioxide (CO2) 25 20 - 32 mmol/L    Anion Gap 15 (H) 3 - 14 mmol/L    Urea Nitrogen 22 7 - 30 mg/dL    Creatinine 1.30 mg/dL    Calcium 10.4 (H) 8.5 - 10.1 mg/dL    Glucose 393 (H) 70 - 99 mg/dL    GFR Estimate 53 (L) >60 mL/min/1.73m2      Comment:      Effective December 21, 2021 eGFRcr in adults is calculated using the 2021 CKD-EPI creatinine equation which includes age and gender (Thao et al., NEJM, DOI: 10.1056/MHPLlv5929483)       If you have any questions or concerns, please call the clinic at the number listed above.       Sincerely,      Jhon Gutierrez MD

## 2022-02-28 NOTE — RESULT ENCOUNTER NOTE
Please send letter with results and recommendations.      As discussed in clinic, your blood sugar average (hemoglobin A1c) is 9.5%, above your goal of 8.0% or less.  Your diabetes medications have been changed at your visit today.    As discussed by telephone:     Your kidney function is stable.   Your calcium level is slightly high, this should be rechecked with a blood test when your return to clinic for your diabetes in 4 weeks.     You have an appointment with Dr. Gutierrez scheduled for Monday March 28th

## 2022-02-28 NOTE — PATIENT INSTRUCTIONS
Start Jardiance 10mg daily     Stop Novolog meal insulin    Reduce Lantus to 35 units daily      Check your blood sugar twice daily, in the morning in the afternoon or before bed    See Dr. Gutierrez in one month with blood sugar readings to adjust Jardiance dose

## 2022-03-01 ENCOUNTER — MYC REFILL (OUTPATIENT)
Dept: FAMILY MEDICINE | Facility: CLINIC | Age: 87
End: 2022-03-01
Payer: MEDICARE

## 2022-03-01 DIAGNOSIS — Z79.4 TYPE 2 DIABETES MELLITUS WITH HYPERGLYCEMIA, WITH LONG-TERM CURRENT USE OF INSULIN (H): ICD-10-CM

## 2022-03-01 DIAGNOSIS — E11.65 TYPE 2 DIABETES MELLITUS WITH HYPERGLYCEMIA, WITH LONG-TERM CURRENT USE OF INSULIN (H): ICD-10-CM

## 2022-03-02 RX ORDER — BLOOD-GLUCOSE METER
KIT MISCELLANEOUS
Qty: 1 KIT | Refills: 0 | Status: SHIPPED | OUTPATIENT
Start: 2022-03-02

## 2022-03-28 ENCOUNTER — OFFICE VISIT (OUTPATIENT)
Dept: FAMILY MEDICINE | Facility: CLINIC | Age: 87
End: 2022-03-28
Payer: MEDICARE

## 2022-03-28 VITALS
HEIGHT: 72 IN | DIASTOLIC BLOOD PRESSURE: 74 MMHG | RESPIRATION RATE: 20 BRPM | OXYGEN SATURATION: 98 % | BODY MASS INDEX: 26.14 KG/M2 | WEIGHT: 193 LBS | SYSTOLIC BLOOD PRESSURE: 124 MMHG | HEART RATE: 91 BPM | TEMPERATURE: 97.4 F

## 2022-03-28 DIAGNOSIS — N18.31 CHRONIC KIDNEY DISEASE, STAGE 3A (H): ICD-10-CM

## 2022-03-28 DIAGNOSIS — E11.65 TYPE 2 DIABETES MELLITUS WITH HYPERGLYCEMIA, WITH LONG-TERM CURRENT USE OF INSULIN (H): Primary | ICD-10-CM

## 2022-03-28 DIAGNOSIS — Z79.4 TYPE 2 DIABETES MELLITUS WITH HYPERGLYCEMIA, WITH LONG-TERM CURRENT USE OF INSULIN (H): ICD-10-CM

## 2022-03-28 DIAGNOSIS — R80.9 TYPE 2 DIABETES MELLITUS WITH MICROALBUMINURIA, WITH LONG-TERM CURRENT USE OF INSULIN (H): ICD-10-CM

## 2022-03-28 DIAGNOSIS — E11.29 TYPE 2 DIABETES MELLITUS WITH MICROALBUMINURIA, WITH LONG-TERM CURRENT USE OF INSULIN (H): ICD-10-CM

## 2022-03-28 DIAGNOSIS — Z79.4 TYPE 2 DIABETES MELLITUS WITH MICROALBUMINURIA, WITH LONG-TERM CURRENT USE OF INSULIN (H): ICD-10-CM

## 2022-03-28 DIAGNOSIS — E11.65 TYPE 2 DIABETES MELLITUS WITH HYPERGLYCEMIA, WITH LONG-TERM CURRENT USE OF INSULIN (H): ICD-10-CM

## 2022-03-28 DIAGNOSIS — E83.52 HIGH CALCIUM LEVELS: ICD-10-CM

## 2022-03-28 DIAGNOSIS — E11.65 TYPE 2 DIABETES MELLITUS WITH HYPERGLYCEMIA, WITHOUT LONG-TERM CURRENT USE OF INSULIN (H): ICD-10-CM

## 2022-03-28 DIAGNOSIS — Z79.4 TYPE 2 DIABETES MELLITUS WITH HYPERGLYCEMIA, WITH LONG-TERM CURRENT USE OF INSULIN (H): Primary | ICD-10-CM

## 2022-03-28 LAB
ANION GAP SERPL CALCULATED.3IONS-SCNC: 12 MMOL/L (ref 5–18)
BUN SERPL-MCNC: 20 MG/DL (ref 8–28)
CALCIUM SERPL-MCNC: 10.2 MG/DL (ref 8.5–10.5)
CHLORIDE BLD-SCNC: 107 MMOL/L (ref 98–107)
CO2 SERPL-SCNC: 20 MMOL/L (ref 22–31)
CREAT SERPL-MCNC: 1.5 MG/DL (ref 0.7–1.3)
GFR SERPL CREATININE-BSD FRML MDRD: 44 ML/MIN/1.73M2
GLUCOSE BLD-MCNC: 156 MG/DL (ref 70–125)
POTASSIUM BLD-SCNC: 4.8 MMOL/L (ref 3.5–5)
SODIUM SERPL-SCNC: 139 MMOL/L (ref 136–145)

## 2022-03-28 PROCEDURE — 36415 COLL VENOUS BLD VENIPUNCTURE: CPT | Performed by: FAMILY MEDICINE

## 2022-03-28 PROCEDURE — 80048 BASIC METABOLIC PNL TOTAL CA: CPT | Performed by: FAMILY MEDICINE

## 2022-03-28 PROCEDURE — 99214 OFFICE O/P EST MOD 30 MIN: CPT | Performed by: FAMILY MEDICINE

## 2022-03-28 RX ORDER — INSULIN GLARGINE 100 [IU]/ML
40 INJECTION, SOLUTION SUBCUTANEOUS AT BEDTIME
Qty: 15 ML | Refills: 0 | Status: SHIPPED | OUTPATIENT
Start: 2022-03-28 | End: 2022-03-30

## 2022-03-28 RX ORDER — INSULIN GLARGINE 100 [IU]/ML
35 INJECTION, SOLUTION SUBCUTANEOUS AT BEDTIME
Qty: 15 ML | Status: CANCELLED | OUTPATIENT
Start: 2022-03-28

## 2022-03-28 NOTE — PATIENT INSTRUCTIONS
Increase your Jardiance dose to 25mg daily  Increase your Lantus insulin to 40 units daily    Continue to monitor your blood sugar closely    We will work on getting a continuous blood glucose monitor      We rechecked your calcium level today due to the mild elevation    I will call with an update on your CGM when your blood test results are available    See Dr. Gutierrez in 4 weeks with your glucose readings

## 2022-03-28 NOTE — PROGRESS NOTES
HPI:   Elie Martinez is a 89 year old  male who presents for:    Chief Complaint   Patient presents with     Follow Up     diabetes check up      Medication Reconciliation     No concerns today. Here in clinic with his daughter (Raghu).    Type 2 diabetes mellitus: Pt would like a continuous glucose monitor as it can be difficult to remember to check sugars regularly.  Previously was checking blood sugars 3 times daily, and was more diligent with keeping a log.  Up until 1 month ago he was using meal insulin, but this was discontinued February 28, 2022 as he was checking glucoses less regularly, not following a log as closely, and at times forgetting to check glucose.  At our 3/28 visit he started empagliflozin 10 mg daily, discontinued meal insulin, and Lantus insulin was continued at 35 units daily.  He has been checking glucoses twice daily most days.  His daughter Raghu has been helping with glucose monitoring, and managing his medications.  Raghu is motivated to help him use a continuous glucose monitor for better monitoring, and ease of following trends and detecting hypoglycemia.  Raghu manages her own diabetes and reports can help her father replace the monitoring patch as needed.    Medications-   Lantus 35 units at night   Jardiance 10mg daily   Metformin 500 daily  Discontinued Novolog at meals per last visit   No side effects related to medications    Checks sugars Mornings and afternoons    Fasting- 280-310  Afternoon- 299-390    No dizziness, feeling faint or recent falls.  Checks feet regularly. No new numbness or tingling in extremities.  No skin wounds    Stage IIIa chronic kidney disease: Base metabolic profile last visit 1 month ago showed GFR stable at 53.    High serum calcium reading: Base metabolic profile 1 month ago showed a very mildly elevated calcium at 10.4.  Calcium level 6 months prior was 9.5.  No new muscle aches cramping or other symptoms.             PMHX:     Patient Active  Problem List   Diagnosis     Long-term insulin use in type 2 diabetes (H)     Cerebral vascular disease     S/P carotid endarterectomy     Hypertension     Prostate cancer (H)     Hyperlipidemia LDL goal <100     Chronic kidney disease, stage 3a (H)     Type 2 diabetes mellitus with hyperglycemia, with long-term current use of insulin (H)       Current Outpatient Medications   Medication Sig Dispense Refill     aspirin 325 MG tablet Take 1 tablet (325 mg) by mouth daily 90 tablet 3     atorvastatin (LIPITOR) 40 MG tablet Take 1 tablet (40 mg) by mouth daily 90 tablet 3     blood glucose (FREESTYLE TEST STRIPS) test strip Use to test blood sugar 3 times daily or as directed. 300 strip 11     blood glucose (NO BRAND SPECIFIED) lancets standard Use to test blood sugar 3 times daily or as directed. 100 each 11     blood glucose (NO BRAND SPECIFIED) lancets standard Use to test blood sugar 3 times daily or as directed. 300 each 3     blood glucose (NO BRAND SPECIFIED) test strip Use to test blood sugars 3 times daily or as directed 300 each 3     blood glucose monitoring (FREESTYLE FREEDOM LITE) meter device kit Use to test blood sugar 3 times daily or as directed. 1 kit 0     blood glucose monitoring (NO BRAND SPECIFIED) meter device kit Use to test blood sugar 3 times daily or as directed. 1 kit 0     empagliflozin (JARDIANCE) 25 MG TABS tablet Take 1 tablet (25 mg) by mouth daily 90 tablet 0     insulin glargine (LANTUS SOLOSTAR) 100 UNIT/ML pen Inject 40 Units Subcutaneous At Bedtime 15 mL 0     insulin pen needle (NOVOFINE) 32G X 6 MM miscellaneous Use with insulin pen four times daily or as directed 400 each 11     lisinopril (ZESTRIL) 10 MG tablet Take 1 tablet (10 mg) by mouth daily 90 tablet 3     metFORMIN (GLUCOPHAGE-XR) 500 MG 24 hr tablet Take 1 tablet (500 mg) by mouth daily with food 90 tablet 3     Multiple Vitamins-Minerals (ICAPS AREDS FORMULA) TABS Take 1 tablet by mouth 2 times daily         Social  "History     Tobacco Use     Smoking status: Former Smoker     Types: Cigarettes     Quit date: 1994     Years since quittin.8     Smokeless tobacco: Former User     Quit date: 1952   Substance Use Topics     Alcohol use: Yes     Alcohol/week: 0.0 standard drinks     Comment: 2 beers/week     Drug use: No       Social History     Social History Narrative     Not on file       Allergies   Allergen Reactions     No Known Allergies               Review of Systems:     10 point ROS is negative except as noted in HPI         Physical Exam:     Vitals:    22 0850 22 0853   BP: (!) 146/76 124/74   Pulse: 91    Resp: 20    Temp: 97.4  F (36.3  C)    TempSrc: Oral    SpO2: 98%    Weight: 87.5 kg (193 lb)    Height: 1.825 m (5' 11.85\")      Body mass index is 26.28 kg/m .    General: Alert, Awake  in no acute distress  HEENT: Head is free of trauma.   Resp: Clear to auscultation bilaterally. No crackles or wheezes.  CV: Regular rate and rhythm  Abd: Soft, non-tender.  Ext: Warm.    Skin: exposed skin free of rash. Declined foot exam.   Psych: Mood appropriate   Neurologic: Defers to his daughter for review of glycemic readings    Results for orders placed or performed in visit on 22   Basic metabolic panel     Status: Abnormal   Result Value Ref Range    Sodium 139 136 - 145 mmol/L    Potassium 4.8 3.5 - 5.0 mmol/L    Chloride 107 98 - 107 mmol/L    Carbon Dioxide (CO2) 20 (L) 22 - 31 mmol/L    Anion Gap 12 5 - 18 mmol/L    Urea Nitrogen 20 8 - 28 mg/dL    Creatinine 1.50 (H) 0.70 - 1.30 mg/dL    Calcium 10.2 8.5 - 10.5 mg/dL    Glucose 156 (H) 70 - 125 mg/dL    GFR Estimate 44 (L) >60 mL/min/1.73m2         Assessment and Plan   1. Type 2 diabetes mellitus with hyperglycemia, with long-term current use of insulin (H)  We discontinued meal insulin 1 month ago due to concerns about his ability to closely monitor glucoses with meal insulin regimen, and risk of hypoglycemia  Started empagliflozin " 10 mg daily 1 month ago    Fasting and postprandial glucoses above goal. Will set fasting goal between 100-160 (uptodate says 160-170 for older adults w/ comorbidities). Will increase Jardiance to 25mg daily and lantus slight slightly 35 units to 40 units for better control.   Order continuous glucose monitor due to increasing difficulty for patient to remember to check his glucose regularly and record in a log, or respond to hypoglycemic readings.    This will be particularly important as we titrate his medication to improve glycemic control.  Ultimate Goal of eventually weaning down lantus.   Will plan to add GLP-1 agonist at future visit if he does not achieve glycemic control with empagliflozin.   Follow up in 1 month and review continuous glucose monitoring data.     His daughter is available and willing to help him manage his glucoses with the assistance of a continuous glucose monitor.    - empagliflozin (JARDIANCE) 25 MG TABS tablet; Take 1 tablet (25 mg) by mouth daily  Dispense: 90 tablet; Refill: 0  - insulin glargine (LANTUS SOLOSTAR) 100 UNIT/ML pen; Inject 40 Units Subcutaneous At Bedtime  Dispense: 15 mL; Refill: 0  -Continue Metformin 500 BID   -CGM Freestyle Juan       2. High calcium level  Ca @ 10.4 on last check. Will repeat BMP to determine if further management is needed   - Basic metabolic panel; Future  - Basic metabolic panel    Calcium level return in the normal range.  No further evaluation needed at this time.      3. Chronic kidney disease, stage 3a (H)  Last estimated GFR of 53. Will recheck creatinine with Ca recheck on BMP  - Basic metabolic panel; Future  - Basic metabolic panel    Follow up: 1 month for Diabetes follow up  Options for treatment and follow-up care were reviewed with the patient and/or guardian. Elie Martinez and/or guardian engaged in the decision making process and verbalized understanding of the options discussed and agreed with the final plan.    Beth Serrano  MS3  Jhon Gutierrez MD    Medical Student Supervision  In supervising the medical student, I saw and evaluated the patient with the student and personally performed all aspects of the history and physical.  I have reviewed and verified that the documentation is correct and complete, and edited and added to the student documentation.    Jhon Gutierrez MD  Faculty - [facility]

## 2022-03-30 RX ORDER — INSULIN GLARGINE 100 [IU]/ML
40 INJECTION, SOLUTION SUBCUTANEOUS AT BEDTIME
Qty: 45 ML | Refills: 1 | Status: SHIPPED | OUTPATIENT
Start: 2022-03-30 | End: 2022-04-25

## 2022-03-30 RX ORDER — INSULIN GLARGINE 100 [IU]/ML
40 INJECTION, SOLUTION SUBCUTANEOUS AT BEDTIME
Qty: 15 ML | Refills: 2 | Status: SHIPPED | OUTPATIENT
Start: 2022-03-30 | End: 2022-03-30

## 2022-04-01 NOTE — RESULT ENCOUNTER NOTE
"Your kidney function is stable - you continue to have \"stage 3 chronic kidney disease\" , as you have for years, which means you need to continue to control your diabetes and blood pressure to protect your kidneys, and have this blood kidney test every 3 to 3 months.    "

## 2022-04-07 ENCOUNTER — TELEPHONE (OUTPATIENT)
Dept: FAMILY MEDICINE | Facility: CLINIC | Age: 87
End: 2022-04-07

## 2022-04-07 NOTE — TELEPHONE ENCOUNTER
Hello,    This is [facility] informing you that this patient's Freestyle Juan 2 devices has been approved to fill through Medicare Part B and we will be reaching out to the patient to setup an order.    Thank you,    [facility] - DCS Team

## 2022-04-15 ENCOUNTER — TELEPHONE (OUTPATIENT)
Dept: FAMILY MEDICINE | Facility: CLINIC | Age: 87
End: 2022-04-15

## 2022-04-25 ENCOUNTER — OFFICE VISIT (OUTPATIENT)
Dept: FAMILY MEDICINE | Facility: CLINIC | Age: 87
End: 2022-04-25
Payer: MEDICARE

## 2022-04-25 VITALS
OXYGEN SATURATION: 98 % | HEART RATE: 90 BPM | SYSTOLIC BLOOD PRESSURE: 134 MMHG | WEIGHT: 193 LBS | BODY MASS INDEX: 26.14 KG/M2 | HEIGHT: 72 IN | RESPIRATION RATE: 18 BRPM | TEMPERATURE: 97.4 F | DIASTOLIC BLOOD PRESSURE: 75 MMHG

## 2022-04-25 DIAGNOSIS — I10 PRIMARY HYPERTENSION: ICD-10-CM

## 2022-04-25 DIAGNOSIS — N18.31 CHRONIC KIDNEY DISEASE, STAGE 3A (H): ICD-10-CM

## 2022-04-25 DIAGNOSIS — Z79.4 TYPE 2 DIABETES MELLITUS WITH HYPERGLYCEMIA, WITH LONG-TERM CURRENT USE OF INSULIN (H): Primary | ICD-10-CM

## 2022-04-25 DIAGNOSIS — E11.65 TYPE 2 DIABETES MELLITUS WITH HYPERGLYCEMIA, WITH LONG-TERM CURRENT USE OF INSULIN (H): Primary | ICD-10-CM

## 2022-04-25 PROCEDURE — 99214 OFFICE O/P EST MOD 30 MIN: CPT | Performed by: FAMILY MEDICINE

## 2022-04-25 RX ORDER — INSULIN GLARGINE 100 [IU]/ML
30 INJECTION, SOLUTION SUBCUTANEOUS AT BEDTIME
Qty: 45 ML | Refills: 1
Start: 2022-04-25 | End: 2022-05-23

## 2022-04-25 NOTE — PROGRESS NOTES
HPI:   Elie Martinez is a 89 year old  male who presents for:    Chief Complaint   Patient presents with     Follow Up     DM med follow up       DM2: Using freestyle godfrey 2 CGM.    Glucoses all in range, 100% in target range.  Only using the  when fasting over the past month.  Glucoses .      Jardiance was increased from 10 mg to 25 mg back on 3/28/2022, and Lantus insulin was increased back up to his typical historic dose of 40 mg a day on 3/28/2022.  He discontinued meal insulin about 2 months ago.    No low glucoses last 30 days.  No hypoglycemic symptoms.    Alarms at 70 low, and 240 high.    He his daughter, and his son are interested in further simplifying his diabetic regimen, and weaning off insulin if possible.    He has not noticed any change in his urination, skin rash, itching, dysuria, dizziness, lightheadedness, orthostatic symptoms.           PMHX:     Patient Active Problem List   Diagnosis     Long-term insulin use in type 2 diabetes (H)     Cerebral vascular disease     S/P carotid endarterectomy     Hypertension     Prostate cancer (H)     Hyperlipidemia LDL goal <100     Chronic kidney disease, stage 3a (H)     Type 2 diabetes mellitus with hyperglycemia, with long-term current use of insulin (H)       Current Outpatient Medications   Medication Sig Dispense Refill     aspirin 325 MG tablet Take 1 tablet (325 mg) by mouth daily 90 tablet 3     insulin pen needle (NOVOFINE) 32G X 6 MM miscellaneous Use with insulin pen four times daily or as directed 400 each 11     atorvastatin (LIPITOR) 40 MG tablet Take 1 tablet (40 mg) by mouth daily 90 tablet 3     blood glucose (FREESTYLE TEST STRIPS) test strip Use to test blood sugar 3 times daily or as directed. 300 strip 11     blood glucose (NO BRAND SPECIFIED) lancets standard Use to test blood sugar 3 times daily or as directed. 100 each 11     blood glucose (NO BRAND SPECIFIED) lancets standard Use to test blood sugar 3 times  daily or as directed. 300 each 3     blood glucose (NO BRAND SPECIFIED) test strip Use to test blood sugars 3 times daily or as directed 300 each 3     blood glucose monitoring (FREESTYLE FREEDOM LITE) meter device kit Use to test blood sugar 3 times daily or as directed. 1 kit 0     blood glucose monitoring (NO BRAND SPECIFIED) meter device kit Use to test blood sugar 3 times daily or as directed. 1 kit 0     Continuous Blood Gluc Sensor (FREESTYLE RADHA 2 SENSOR) MISC 1 each every 14 days Use 1 sensor every 14 days. Use to read blood sugars per 's instructions. 6 each 5     empagliflozin (JARDIANCE) 25 MG TABS tablet Take 1 tablet (25 mg) by mouth daily 90 tablet 3     insulin glargine (LANTUS SOLOSTAR) 100 UNIT/ML pen Inject 40 Units Subcutaneous At Bedtime 45 mL 1     lisinopril (ZESTRIL) 10 MG tablet Take 1 tablet (10 mg) by mouth daily 90 tablet 3     metFORMIN (GLUCOPHAGE XR) 500 MG 24 hr tablet Take 1 tablet (500 mg) by mouth daily with food 90 tablet 3     Multiple Vitamins-Minerals (ICAPS AREDS FORMULA) TABS Take 1 tablet by mouth 2 times daily         Social History     Tobacco Use     Smoking status: Former Smoker     Types: Cigarettes     Quit date: 1994     Years since quittin.0     Smokeless tobacco: Former User     Quit date: 1952   Substance Use Topics     Alcohol use: Yes     Alcohol/week: 0.0 standard drinks     Comment: 2 beers/week     Drug use: No       Social History     Social History Narrative     Not on file       Allergies   Allergen Reactions     No Known Allergies        No results found for this or any previous visit (from the past 24 hour(s)).         Review of Systems:     General: No fevers or chills  Feels his weight is stable  ENT: No upper respiratory symptoms  Cardiovascular: No chest pain or shortness of breath  Respiratory: No cough or shortness of breath  : No urinary changes         Physical Exam:     Vitals:    22 0920   BP: 134/75   Pulse:  "90   Resp: 18   Temp: 97.4  F (36.3  C)   SpO2: 98%   Weight: 87.5 kg (193 lb)   Height: 1.824 m (5' 11.8\")     Body mass index is 26.32 kg/m .    General: Alert,  in no acute distress  HEENT: Head is free of trauma.   Sclerae non-icteric. PERRL, Moist oral mucus membranes, no tonsilar hypertrophy or exudate. TM's white with normal bony and light landmarks.  Resp: Clear bilaterally.  Fair air exchange.  CV: Regular rate and rhythm  Abd: Soft, non-tender.  Ext: Warm.  No significant edema.  Psych: Mood appropriate       Assessment and Plan   1. Type 2 diabetes mellitus with hyperglycemia, with long-term current use of insulin (H)  Home glucose monitoring shows good control  Patient and family are interested in trying to wean Lantus dose  Will reduce Lantus from 40 to 30 units each day  Continue to follow glucoses 3 times daily with his continuous glucose monitor  Would consider GLP-1 agonist in the future hopefully to replace insulin    Fasting goal for his age could be 150-170, but for simplicity and because he is on an SGLT2 and I want to prevent hypovolemia, I have set the fasting goal at 150  Postprandial goal is less than 250    2. Primary hypertension  Well-controlled  Continue lisinopril 10 mg daily  No significant blood pressure change or orthostatic symptoms now that he is over 1 month on maximum dose Jardiance    3. Chronic kidney disease, stage 3a (H)  Most recent creatinine 1.5, estimated GFR 44 which is stable over the past year  Repeat basic metabolic profile in late June or July 2022    Follow up: 1 month  Options for treatment and follow-up care were reviewed with the patient and/or guardian. Elie Martinez and/or guardian engaged in the decision making process and verbalized understanding of the options discussed and agreed with the final plan.    Jhon Gutierrez MD  Faculty - [facility]                "

## 2022-04-25 NOTE — PATIENT INSTRUCTIONS
"Your glucoses are well controlled!    Reduce your LANTUS insulin to 30 units daily    Continue to use your CGM every day    Fasting goal is less than 150.    After you eat, goal glucose is less than 250.    Continue Jardiance to 25mg daily     We will continue to work to reduce your Lantus dose at future visits, and likely replace with at medication in the \"GLP-1 agonist family\"    Return to see Dr. Gutierrez in about one month to continue to make diabetic medication adjustments.            "

## 2022-05-15 ENCOUNTER — HEALTH MAINTENANCE LETTER (OUTPATIENT)
Age: 87
End: 2022-05-15

## 2022-05-23 ENCOUNTER — OFFICE VISIT (OUTPATIENT)
Dept: FAMILY MEDICINE | Facility: CLINIC | Age: 87
End: 2022-05-23
Payer: MEDICARE

## 2022-05-23 VITALS
SYSTOLIC BLOOD PRESSURE: 137 MMHG | OXYGEN SATURATION: 98 % | BODY MASS INDEX: 26.41 KG/M2 | HEART RATE: 91 BPM | TEMPERATURE: 97.5 F | WEIGHT: 195 LBS | RESPIRATION RATE: 20 BRPM | HEIGHT: 72 IN | DIASTOLIC BLOOD PRESSURE: 72 MMHG

## 2022-05-23 DIAGNOSIS — E11.65 TYPE 2 DIABETES MELLITUS WITH HYPERGLYCEMIA, WITH LONG-TERM CURRENT USE OF INSULIN (H): ICD-10-CM

## 2022-05-23 DIAGNOSIS — I67.9 CEREBRAL VASCULAR DISEASE: ICD-10-CM

## 2022-05-23 DIAGNOSIS — Z79.4 TYPE 2 DIABETES MELLITUS WITH HYPERGLYCEMIA, WITH LONG-TERM CURRENT USE OF INSULIN (H): ICD-10-CM

## 2022-05-23 DIAGNOSIS — I10 ESSENTIAL HYPERTENSION: ICD-10-CM

## 2022-05-23 PROCEDURE — 99214 OFFICE O/P EST MOD 30 MIN: CPT | Performed by: FAMILY MEDICINE

## 2022-05-23 RX ORDER — LISINOPRIL 10 MG/1
10 TABLET ORAL DAILY
Qty: 90 TABLET | Refills: 3 | Status: SHIPPED | OUTPATIENT
Start: 2022-05-23 | End: 2023-06-03

## 2022-05-23 RX ORDER — METFORMIN HCL 500 MG
500 TABLET, EXTENDED RELEASE 24 HR ORAL
Qty: 90 TABLET | Refills: 3 | Status: SHIPPED | OUTPATIENT
Start: 2022-05-23 | End: 2022-07-11

## 2022-05-23 RX ORDER — ATORVASTATIN CALCIUM 40 MG/1
40 TABLET, FILM COATED ORAL DAILY
Qty: 90 TABLET | Refills: 3 | Status: SHIPPED | OUTPATIENT
Start: 2022-05-23 | End: 2023-10-02

## 2022-05-23 RX ORDER — INSULIN GLARGINE 100 [IU]/ML
40 INJECTION, SOLUTION SUBCUTANEOUS AT BEDTIME
Qty: 45 ML | Refills: 1
Start: 2022-05-23 | End: 2022-12-15

## 2022-05-23 NOTE — PATIENT INSTRUCTIONS
For diabetes:    Increase your LANTUS to 40 units once daily    Continue Jardiance 25mg daily  Continue Metformin 500mg daily    Check your blood sugar at least twice a day, particularly when fasting and before bed to be sure your blood sugar does not get too low      Continue your blood pressure medication    [facility]      See Dr. Gutierrez in about 6 weeks to check an A1c (3 month blood sugar average) and check the skin of your feet.

## 2022-05-23 NOTE — PROGRESS NOTES
HPI:   Elie Martinez is a 89 year old who presents for:    Chief Complaint   Patient presents with     Follow Up     Dm follow-up- has been forgetting blood sugars and not wanting to do some.     Presents accompanied by his son today.    DM2:  Increased Jardiance to 25mg daily on 3/28/22.  For the month of April he took Jardiance 25 mg daily along with 40 units of Lantus insulin.  Glucoses were well controlled, so on 4/25/2022 in an attempt to start to wean from Lantus insulin his Lantus dose was decreased from 40 units to 30 units daily.  He only checked glucose 7 times since last visit,.  He has a continuous glucose monitor but has not been touching the  to his monitor to record readings.  He reports just forgetting to check glucoses.  Has not had problems with his equipment.  His son and daughter will remind him to check glucoses when they are visiting.  Glucoses 129-244.  Some fasting some postprandial, mostly fasting in the morning.  129 and 152 was the lowest fasting.    Overall blood glucoses above goal since the reduction of Lantus from 40 to 30 units 1 month ago.  Lantus was reduced from 40 units to 30 units with the addition of Jardiance in hopes of eventually weaning Lantus insulin and potentially replacing with a GLP 1 agonist.    He has not experienced any hypoglycemic symptoms.  No glucose readings less than 129.    .Hypertension: Continues on lisinopril 10 mg daily.  No dizziness, lightheadedness, orthostatic symptoms.  Systolic blood pressures been stable in the 130s despite the addition of Jardiance.    Cerebrovascular disease: Patient and his son both report difficulty with memory.  He is able to live independently but is no longer driving.  Needs assistance with finances.  Still engages in social events, conversation, and independently manages his daily living.  Overall needing more help from family.           PMHX:     Patient Active Problem List   Diagnosis     Long-term insulin  use in type 2 diabetes (H)     Cerebral vascular disease     S/P carotid endarterectomy     Hypertension     Prostate cancer (H)     Hyperlipidemia LDL goal <100     Chronic kidney disease, stage 3a (H)     Type 2 diabetes mellitus with hyperglycemia, with long-term current use of insulin (H)       Current Outpatient Medications   Medication Sig Dispense Refill     aspirin 325 MG tablet Take 1 tablet (325 mg) by mouth daily 90 tablet 3     atorvastatin (LIPITOR) 40 MG tablet Take 1 tablet (40 mg) by mouth daily 90 tablet 3     blood glucose (FREESTYLE TEST STRIPS) test strip Use to test blood sugar 3 times daily or as directed. 300 strip 11     blood glucose (NO BRAND SPECIFIED) lancets standard Use to test blood sugar 3 times daily or as directed. 100 each 11     blood glucose (NO BRAND SPECIFIED) lancets standard Use to test blood sugar 3 times daily or as directed. 300 each 3     blood glucose (NO BRAND SPECIFIED) test strip Use to test blood sugars 3 times daily or as directed 300 each 3     blood glucose monitoring (FREESTYLE FREEDOM LITE) meter device kit Use to test blood sugar 3 times daily or as directed. 1 kit 0     blood glucose monitoring (NO BRAND SPECIFIED) meter device kit Use to test blood sugar 3 times daily or as directed. 1 kit 0     Continuous Blood Gluc Sensor (FREESTYLE RADHA 2 SENSOR) MISC 1 each every 14 days Use 1 sensor every 14 days. Use to read blood sugars per 's instructions. 6 each 5     empagliflozin (JARDIANCE) 25 MG TABS tablet Take 1 tablet (25 mg) by mouth daily 90 tablet 3     insulin glargine (LANTUS SOLOSTAR) 100 UNIT/ML pen Inject 40 Units Subcutaneous At Bedtime 45 mL 1     insulin pen needle (NOVOFINE) 32G X 6 MM miscellaneous Use with insulin pen four times daily or as directed 400 each 11     lisinopril (ZESTRIL) 10 MG tablet Take 1 tablet (10 mg) by mouth daily 90 tablet 3     metFORMIN (GLUCOPHAGE XR) 500 MG 24 hr tablet Take 1 tablet (500 mg) by mouth daily  "with food 90 tablet 3     Multiple Vitamins-Minerals (ICAPS AREDS FORMULA) TABS Take 1 tablet by mouth 2 times daily         Social History     Tobacco Use     Smoking status: Former Smoker     Types: Cigarettes     Quit date: 1994     Years since quittin.0     Smokeless tobacco: Former User     Quit date: 1952   Substance Use Topics     Alcohol use: Yes     Alcohol/week: 0.0 standard drinks     Comment: 2 beers/week     Drug use: No       Social History     Social History Narrative     Not on file       Allergies   Allergen Reactions     No Known Allergies        No results found for this or any previous visit (from the past 24 hour(s)).         Review of Systems:   General: No fevers or chills  He has lost about 5 pounds over the past year  ENT: No upper respiratory symptoms  COVID screening questions are negative  Cardiovascular: No chest pain episodes  Respiratory: No shortness of breath.  No cough  : No change in urination         Physical Exam:     Vitals:    22 1332   BP: 137/72   Pulse: 91   Resp: 20   Temp: 97.5  F (36.4  C)   SpO2: 98%   Weight: 88.5 kg (195 lb)   Height: 1.825 m (5' 11.85\")     Body mass index is 26.56 kg/m .    General: Alert, in no acute distress  HEENT: Head is free of trauma.   Sclerae non-icteric. PERRL, Moist oral mucus membranes, no tonsilar hypertrophy or exudate. TM's white with normal bony and light landmarks.  Resp: Clear.  Fair air exchange bilaterally.  CV: Regular rate and rhythm  Abd: Soft, non-tender.   Extremities: No significant edema  Psych: Mood appropriate       Assessment and Plan   1. Type 2 diabetes mellitus with hyperglycemia, with long-term current use of insulin (H)  Attempt at reducing his Lantus dose from 40 to 30 units increased home fasting glucoses above goal of 150  Will return to his typical Lantus dose of 40 units daily  Continue metformin 500 mg daily (he cannot tolerate higher doses due to loose stool side effect)  Continue " Jardiance 25 mg daily    Discussed with the patient and his son adding a GLP-1 agonist which may allow him to further reduce his Lantus dose, but I cannot guarantee this will mean he will be able to discontinue Lantus.  Due to cost concerns patient and his son elect to continue the current regimen which seems to be controlling his glucoses well.    He will return to clinic in 1 month for an A1c and we will continue to discuss alternative diabetic medication such as GLP-1 agonist    Due for foot exam next visit      - insulin glargine (LANTUS SOLOSTAR) 100 UNIT/ML pen; Inject 40 Units Subcutaneous At Bedtime  Dispense: 45 mL; Refill: 1  - metFORMIN (GLUCOPHAGE XR) 500 MG 24 hr tablet; Take 1 tablet (500 mg) by mouth daily with food  Dispense: 90 tablet; Refill: 3  - empagliflozin (JARDIANCE) 25 MG TABS tablet; Take 1 tablet (25 mg) by mouth daily  Dispense: 90 tablet; Refill: 3    2. Essential hypertension  Well-controlled    Repeat basic metabolic profile in June or July 2022    - lisinopril (ZESTRIL) 10 MG tablet; Take 1 tablet (10 mg) by mouth daily  Dispense: 90 tablet; Refill: 3    3. Cerebral vascular disease  No acute neurologic symptoms  Continues to have slowly progressive memory issues  We have discussed Aricept and Namenda in the past which has been declined    - atorvastatin (LIPITOR) 40 MG tablet; Take 1 tablet (40 mg) by mouth daily  Dispense: 90 tablet; Refill: 3      Follow up: 6 weeks for A1c and BMP  Options for treatment and follow-up care were reviewed with the patient and/or guardian. Elie Martinez and/or guardian engaged in the decision making process and verbalized understanding of the options discussed and agreed with the final plan.    Jhon Gutierrez MD  Faculty - [facility]

## 2022-06-20 LAB — RETINOPATHY: NORMAL

## 2022-07-11 ENCOUNTER — OFFICE VISIT (OUTPATIENT)
Dept: FAMILY MEDICINE | Facility: CLINIC | Age: 87
End: 2022-07-11
Payer: MEDICARE

## 2022-07-11 VITALS
SYSTOLIC BLOOD PRESSURE: 116 MMHG | OXYGEN SATURATION: 97 % | HEART RATE: 48 BPM | HEIGHT: 71 IN | BODY MASS INDEX: 26.6 KG/M2 | RESPIRATION RATE: 18 BRPM | WEIGHT: 190 LBS | DIASTOLIC BLOOD PRESSURE: 58 MMHG | TEMPERATURE: 97.6 F

## 2022-07-11 DIAGNOSIS — R19.5 LOOSE STOOLS: ICD-10-CM

## 2022-07-11 DIAGNOSIS — N18.31 CHRONIC KIDNEY DISEASE, STAGE 3A (H): Primary | ICD-10-CM

## 2022-07-11 DIAGNOSIS — E11.65 TYPE 2 DIABETES MELLITUS WITH HYPERGLYCEMIA, WITH LONG-TERM CURRENT USE OF INSULIN (H): ICD-10-CM

## 2022-07-11 DIAGNOSIS — Z79.4 TYPE 2 DIABETES MELLITUS WITH HYPERGLYCEMIA, WITH LONG-TERM CURRENT USE OF INSULIN (H): ICD-10-CM

## 2022-07-11 DIAGNOSIS — I45.9 SKIPPED HEART BEATS: ICD-10-CM

## 2022-07-11 DIAGNOSIS — R41.81 AGE-RELATED COGNITIVE DECLINE: ICD-10-CM

## 2022-07-11 LAB
ANION GAP SERPL CALCULATED.3IONS-SCNC: 16 MMOL/L (ref 3–14)
BUN SERPL-MCNC: 20 MG/DL (ref 7–30)
CALCIUM SERPL-MCNC: 10.1 MG/DL (ref 8.5–10.1)
CHLORIDE BLD-SCNC: 100 MMOL/L (ref 94–109)
CO2 SERPL-SCNC: 27 MMOL/L (ref 20–32)
CREAT SERPL-MCNC: 1.3 MG/DL (ref 0.66–1.25)
GFR SERPL CREATININE-BSD FRML MDRD: 53 ML/MIN/1.73M2
GLUCOSE BLD-MCNC: 98 MG/DL (ref 70–99)
HBA1C MFR BLD: 6.6 % (ref 0–5.6)
POTASSIUM BLD-SCNC: 5.1 MMOL/L (ref 3.4–5.3)
SODIUM SERPL-SCNC: 143 MMOL/L (ref 133–144)

## 2022-07-11 PROCEDURE — 80048 BASIC METABOLIC PNL TOTAL CA: CPT | Performed by: FAMILY MEDICINE

## 2022-07-11 PROCEDURE — 99214 OFFICE O/P EST MOD 30 MIN: CPT | Performed by: FAMILY MEDICINE

## 2022-07-11 PROCEDURE — 83036 HEMOGLOBIN GLYCOSYLATED A1C: CPT | Performed by: FAMILY MEDICINE

## 2022-07-11 PROCEDURE — 36415 COLL VENOUS BLD VENIPUNCTURE: CPT | Performed by: FAMILY MEDICINE

## 2022-07-11 NOTE — PATIENT INSTRUCTIONS
Stop metfomin to help with the loose stools    Restart your glucose monitor, and record two readings daily, first thing in the morning, and before bed    See Dr. Gutierrez in 2 weeks

## 2022-07-11 NOTE — LETTER
July 13, 2022      Juarez Martinez  [address] [place] [address]   [place] [address]        Dear ,    We are writing to inform you of your test results.    As discussed by telephone, your kidney function is stable, but as you have chronic kidney disease, we will continue to monitor your kidney function about once every 3 months.     Your blood sugar average shows excellent blood sugar control.  Your A1c is 6.6%, and your goal is 8.5% or less. We will continue to work to transition from Lantus insulin to an easier to manage medication as discussed at your recent visit.        Resulted Orders   Basic metabolic panel   Result Value Ref Range    Sodium 143 133 - 144 mmol/L    Potassium 5.1 3.4 - 5.3 mmol/L    Chloride 100 94 - 109 mmol/L    Carbon Dioxide (CO2) 27 20 - 32 mmol/L    Anion Gap 16 (H) 3 - 14 mmol/L    Urea Nitrogen 20 7 - 30 mg/dL    Creatinine 1.30 (H) 0.66 - 1.25 mg/dL    Calcium 10.1 8.5 - 10.1 mg/dL    Glucose 98 70 - 99 mg/dL    GFR Estimate 53 (L) >60 mL/min/1.73m2      Comment:      Effective December 21, 2021 eGFRcr in adults is calculated using the 2021 CKD-EPI creatinine equation which includes age and gender (Thao et al., NEJM, DOI: 10.1056/XECIdd9942676)   Hemoglobin A1c   Result Value Ref Range    Hemoglobin A1C 6.6 (H) 0.0 - 5.6 %      Comment:      Normal <5.7%   Prediabetes 5.7-6.4%    Diabetes 6.5% or higher     Note: Adopted from ADA consensus guidelines.       If you have any questions or concerns, please call the clinic at the number listed above.       Sincerely,      Jhon Gutierrez MD

## 2022-07-11 NOTE — PROGRESS NOTES
"       HPI:   Elie Martinez is a 89 year old  male who presents for:    Chief Complaint   Patient presents with     Follow Up     Hip pain when trying to put shoes and socks on        Right hip pain:  Fell getting out of bed about a month ago.  Was getting up, \"tried to turn too fast without a cane or walker\" and fell into bedroom doorknob, then slid down the door onto right hip.  Immediate pain at right hip.  Was limping for a couple weeks, now better.  Took advil for 2 weeks.  No pain today, but has had some intermittent right hip pain when crossing right leg over his left. No pain with walking.  Did not strike head or other injuries.    Loose stools:  Loose stools over past couple of months.  Now having loose stool accidents near daily. May have been taking 2-3 doses of metformin accidentally for 2-3 weeks per son and daughter who help with his pill boxes.  Son and daughter noticed 2-3 days worth of pills would be taken in one day period.  Now using a auto med dispenser past 2 weeks.    Cognitive decline: Patient's son and daughter both with him at the visit today.  Both have noticed memory difficulty, and more difficulty managing his daily tasks and schedule over the past couple of years, and more noticeable over the past 6 months.  They have noticed more recently is having difficulty taking his medications on a regular schedule.  He will forget conversations that were had recently.  Both the son and daughter visit him on a daily basis to help with household tasks.  He is needing help with basic housekeeping.  Son and daughter prepare his meals, and he will simply microwave what was left for him.  His son and daughter leave notes for him to help him manage his day.  His son has been managing his finances for the past couple of years.  Son and daughter are working to find assisted living options.  They brought a [facility] form for me to document his need for assistance with some basic daily care needs.    Type 2 " diabetes:  He increased his dose of Jardiance to 25 mg on 3/28/2022.  An attempt was made in April to decrease his Lantus insulin from 40 units to 30 units, but he had fasting glucoses rise to greater than 150.  Lantus was increased back to his typical dose of 40 units daily on May 23.  He has been using metformin 500 mg daily, higher doses in the past have caused significant loose stools.  He has been suffering with loose stools over the past 4 to 6 weeks, to the point that he is having near daily stool incontinence accidents.  As outlined above it is likely that he has been taking additional doses of metformin due to forgetting that he had taken a dose earlier in the day.     Although he has a continuous glucose monitor he is forgetting to use the remote sensor to record the values.  He has no glucose values to share today.  His daughter did not place the continuous glucose monitor over the last 2 weeks as he has not been checking.  She thinks she would be able to put together some reminders and phone calls so that he would be checking his glucoses with the continuous glucose monitor  at least twice daily, particularly at bedtime and first thing in the morning.  No hypoglycemic symptoms.  No readings over the past 2 weeks.               PMHX:     Patient Active Problem List   Diagnosis     Long-term insulin use in type 2 diabetes (H)     Cerebral vascular disease     S/P carotid endarterectomy     Hypertension     Prostate cancer (H)     Hyperlipidemia LDL goal <100     Chronic kidney disease, stage 3a (H)     Type 2 diabetes mellitus with hyperglycemia, with long-term current use of insulin (H)       Current Outpatient Medications   Medication Sig Dispense Refill     aspirin 325 MG tablet Take 1 tablet (325 mg) by mouth daily 90 tablet 3     atorvastatin (LIPITOR) 40 MG tablet Take 1 tablet (40 mg) by mouth daily 90 tablet 3     blood glucose (FREESTYLE TEST STRIPS) test strip Use to test blood sugar 3  times daily or as directed. 300 strip 11     blood glucose (NO BRAND SPECIFIED) lancets standard Use to test blood sugar 3 times daily or as directed. 100 each 11     blood glucose (NO BRAND SPECIFIED) lancets standard Use to test blood sugar 3 times daily or as directed. 300 each 3     blood glucose (NO BRAND SPECIFIED) test strip Use to test blood sugars 3 times daily or as directed 300 each 3     blood glucose monitoring (FREESTYLE FREEDOM LITE) meter device kit Use to test blood sugar 3 times daily or as directed. 1 kit 0     blood glucose monitoring (NO BRAND SPECIFIED) meter device kit Use to test blood sugar 3 times daily or as directed. 1 kit 0     Continuous Blood Gluc Sensor (FREESTYLE RADHA 2 SENSOR) MISC 1 each every 14 days Use 1 sensor every 14 days. Use to read blood sugars per 's instructions. 6 each 5     empagliflozin (JARDIANCE) 25 MG TABS tablet Take 1 tablet (25 mg) by mouth daily 90 tablet 3     insulin glargine (LANTUS SOLOSTAR) 100 UNIT/ML pen Inject 40 Units Subcutaneous At Bedtime 45 mL 1     insulin pen needle (NOVOFINE) 32G X 6 MM miscellaneous Use with insulin pen four times daily or as directed 400 each 11     lisinopril (ZESTRIL) 10 MG tablet Take 1 tablet (10 mg) by mouth daily 90 tablet 3     Multiple Vitamins-Minerals (ICAPS AREDS FORMULA) TABS Take 1 tablet by mouth 2 times daily         Social History     Tobacco Use     Smoking status: Former Smoker     Types: Cigarettes     Quit date: 1994     Years since quittin.1     Smokeless tobacco: Former User     Quit date: 1952   Substance Use Topics     Alcohol use: Yes     Alcohol/week: 0.0 standard drinks     Comment: 2 beers/week     Drug use: No       Social History     Social History Narrative     Not on file       Allergies   Allergen Reactions     No Known Allergies        No results found for this or any previous visit (from the past 24 hour(s)).         Review of Systems:     General: No fevers or  "chills  Family thinks his weight is stable  ENT: No upper respiratory symptoms  Respiratory: No cough or shortness of breath  Cardiovascular: No chest pain or palpitations  No syncope or presyncopal episodes  No dizziness lightheadedness or orthostatic symptoms.  GI: Loose stools with stool incontinence.  No blood in the stools.  No black stools.  Neurologic: Increasing forgetfulness, needing more help with daily activities as outlined above         Physical Exam:     Vitals:    07/11/22 1101   BP: 116/58   Pulse: (!) 48   Resp: 18   Temp: 97.6  F (36.4  C)   SpO2: 97%   Weight: 86.2 kg (190 lb)   Height: 1.803 m (5' 11\")     Body mass index is 26.5 kg/m .    General: Alert,  in no acute distress  HEENT: Head is free of trauma.   Sclerae non-icteric. PERRL, Moist oral mucus membranes, no tonsilar hypertrophy or exudate. TM's white with normal bony and light landmarks.  Neck: No adenopathy  Resp: Clear to auscultation bilaterally  CV: Heart rate 50.  Occasional missed beats.  Abd: Soft, non-tender.  Ext: Warm.  No significant edema  Skin: exposed skin free of rash  Psych: Mood appropriate     Results for orders placed or performed in visit on 07/11/22   Basic metabolic panel     Status: Abnormal   Result Value Ref Range    Sodium 143 133 - 144 mmol/L    Potassium 5.1 3.4 - 5.3 mmol/L    Chloride 100 94 - 109 mmol/L    Carbon Dioxide (CO2) 27 20 - 32 mmol/L    Anion Gap 16 (H) 3 - 14 mmol/L    Urea Nitrogen 20 7 - 30 mg/dL    Creatinine 1.30 (H) 0.66 - 1.25 mg/dL    Calcium 10.1 8.5 - 10.1 mg/dL    Glucose 98 70 - 99 mg/dL    GFR Estimate 53 (L) >60 mL/min/1.73m2   Hemoglobin A1c     Status: Abnormal   Result Value Ref Range    Hemoglobin A1C 6.6 (H) 0.0 - 5.6 %           Assessment and Plan   1. Chronic kidney disease, stage 3a (H)  Stable  Base metabolic profile today  Continue to avoid nonsteroidals    - Basic metabolic panel; Future  - Basic metabolic panel    2. Type 2 diabetes mellitus with hyperglycemia, with " long-term current use of insulin (H)  A1c 6.6%  Goal 8 to 8.5% or less    We will continue the goal of weaning from Lantus insulin and replacing with a GLP to add to his SGLT2  Most likely will start with exenatide weekly, will wait for current loose stool side effects to resolve prior to starting a new medication  I hope to be able to wean from Lantus and start exenatide in the next 2 weeks    Family and patient will return to clinic in 2 weeks for reevaluation and transition plan      - Basic metabolic panel; Future  - Hemoglobin A1c; Future  - Basic metabolic panel  - Hemoglobin A1c    3. Skipped heart beats  I personally reviewed the EKG today which showed sinus bradycardia with first-degree block.  This is similar to her previous EKG from 2 years ago.  Asymptomatic.  I have asked cardiology to over read the EKG and will follow up in 2 weeks after that assessment    - EKG 12-lead complete w/read - Clinics    4. Age-related cognitive decline  He now is having difficulty with some basic homemaking, meal preparation, and is depending on his son and daughter for tasks of daily living  Not interested in Aricept, Namenda  We have discussed brain imaging in the past which was deferred, but this could be reconsidered  Also reconsider a TSH and B12 level which were last checked about 2 years ago      [facility] and homecare [facility]      5. Loose stools  Plan: discontinue metformin for now  Further evaluation of loose stools continue after 2 weeks without metformin    Follow up: 2 weeks  Options for treatment and follow-up care were reviewed with the patient and/or guardian. Elie Martinez and/or guardian engaged in the decision making process and verbalized understanding of the options discussed and agreed with the final plan.    Jhon Gutierrez MD  Faculty - [facility]

## 2022-07-13 NOTE — RESULT ENCOUNTER NOTE
Please send result letter    As discussed by telephone, your kidney function is stable, but as you have chronic kidney disease, we will continue to monitor your kidney function about once every 3 months.    Your blood sugar average shows excellent blood sugar control.  Your A1c is 6.6%, and your goal is 8.5% or less. We will continue to work to transition from Lantus insulin to an easier to manage medication as discussed at your recent visit.

## 2022-08-01 ENCOUNTER — OFFICE VISIT (OUTPATIENT)
Dept: FAMILY MEDICINE | Facility: CLINIC | Age: 87
End: 2022-08-01
Payer: MEDICARE

## 2022-08-01 VITALS
DIASTOLIC BLOOD PRESSURE: 74 MMHG | WEIGHT: 187 LBS | SYSTOLIC BLOOD PRESSURE: 127 MMHG | OXYGEN SATURATION: 96 % | BODY MASS INDEX: 26.18 KG/M2 | TEMPERATURE: 97.4 F | HEIGHT: 71 IN | RESPIRATION RATE: 18 BRPM | HEART RATE: 61 BPM

## 2022-08-01 DIAGNOSIS — Z23 HIGH PRIORITY FOR 2019-NCOV VACCINE: ICD-10-CM

## 2022-08-01 DIAGNOSIS — R80.9 TYPE 2 DIABETES MELLITUS WITH MICROALBUMINURIA, WITH LONG-TERM CURRENT USE OF INSULIN (H): Primary | ICD-10-CM

## 2022-08-01 DIAGNOSIS — N18.31 CHRONIC KIDNEY DISEASE, STAGE 3A (H): ICD-10-CM

## 2022-08-01 DIAGNOSIS — Z79.4 TYPE 2 DIABETES MELLITUS WITH MICROALBUMINURIA, WITH LONG-TERM CURRENT USE OF INSULIN (H): Primary | ICD-10-CM

## 2022-08-01 DIAGNOSIS — R19.5 LOOSE STOOLS: ICD-10-CM

## 2022-08-01 DIAGNOSIS — I10 PRIMARY HYPERTENSION: ICD-10-CM

## 2022-08-01 DIAGNOSIS — E11.29 TYPE 2 DIABETES MELLITUS WITH MICROALBUMINURIA, WITH LONG-TERM CURRENT USE OF INSULIN (H): Primary | ICD-10-CM

## 2022-08-01 PROCEDURE — 99214 OFFICE O/P EST MOD 30 MIN: CPT | Mod: 25 | Performed by: FAMILY MEDICINE

## 2022-08-01 PROCEDURE — 91305 COVID-19,PF,PFIZER (12+ YRS): CPT | Performed by: FAMILY MEDICINE

## 2022-08-01 PROCEDURE — 0054A COVID-19,PF,PFIZER (12+ YRS): CPT | Performed by: FAMILY MEDICINE

## 2022-08-01 NOTE — PATIENT INSTRUCTIONS
Continue your current Lantus 40 units daily    Continue Jardiance 25mg daily    We will continue to NOT USE METFORMIN, as your loose stools have resolved    At any time, you can see me to start once weekly Exenatide which could possible replace Lantus    Return to clinic to see Dr. Gutierrez in late September or early October to recheck your kidney function and consider converting to Exenatide       Great to see you today - enjoy your summer

## 2022-08-01 NOTE — PROGRESS NOTES
HPI:   Elie Martinez is a 89 year old  male who presents for:    Chief Complaint   Patient presents with     Follow Up     dm     Imm/Inj     COVID-19 VACCINE     Juarez is an 89-year-old who presents with his son for evaluation of multiple issues    Loose stools: At her last visit he was experiencing frequent loose stools, to the point that stool incontinence was significantly reducing his quality of life.  I recommended he discontinue metformin.  He discontinued metformin and his loose stools have resolved.  He and his family are happy with the improvement, and reluctant to return to metformin.    Type 2 diabetes: He brings in his freestyle continuous glucose monitor today.  The glucoses were reviewed.    Freestyle godfrey 2 CGM: glucoses running .  Above 200 rare, with dietary indiscretion.  No polyuria or polydipsia.  Feels his weight is stable.  No hypoglycemic readings or symptoms.    His son or his daughter comes to his apartment every day and checks up on his medication management.  He takes his Lantus each evening and keeps the pen near his chair.  He has not had any accidental extra doses, or missed the dose of Lantus according to the patient and his son today.  They are using a pill dispenser which has been helpful for the remainder of his medications.    Family elects to keep current regimen.    Family seeing Juarez daily.    Hypertension: No dizziness, lightheadedness or orthostatic symptoms.  No chest pain episodes    Chronic kidney disease: No change in urination.  His family prepares meals which he heats up at home.  No significant dietary changes.           PMHX:     Patient Active Problem List   Diagnosis     Long-term insulin use in type 2 diabetes (H)     Cerebral vascular disease     S/P carotid endarterectomy     Hypertension     Prostate cancer (H)     Hyperlipidemia LDL goal <100     Chronic kidney disease, stage 3a (H)     Type 2 diabetes mellitus with hyperglycemia, with long-term  current use of insulin (H)       Current Outpatient Medications   Medication Sig Dispense Refill     aspirin 325 MG tablet Take 1 tablet (325 mg) by mouth daily 90 tablet 3     atorvastatin (LIPITOR) 40 MG tablet Take 1 tablet (40 mg) by mouth daily 90 tablet 3     blood glucose (FREESTYLE TEST STRIPS) test strip Use to test blood sugar 3 times daily or as directed. 300 strip 11     blood glucose (NO BRAND SPECIFIED) lancets standard Use to test blood sugar 3 times daily or as directed. 100 each 11     blood glucose (NO BRAND SPECIFIED) lancets standard Use to test blood sugar 3 times daily or as directed. 300 each 3     blood glucose (NO BRAND SPECIFIED) test strip Use to test blood sugars 3 times daily or as directed 300 each 3     blood glucose monitoring (FREESTYLE FREEDOM LITE) meter device kit Use to test blood sugar 3 times daily or as directed. 1 kit 0     blood glucose monitoring (NO BRAND SPECIFIED) meter device kit Use to test blood sugar 3 times daily or as directed. 1 kit 0     Continuous Blood Gluc Sensor (FREESTYLE RADHA 2 SENSOR) MISC 1 each every 14 days Use 1 sensor every 14 days. Use to read blood sugars per 's instructions. 6 each 5     empagliflozin (JARDIANCE) 25 MG TABS tablet Take 1 tablet (25 mg) by mouth daily 90 tablet 3     insulin glargine (LANTUS SOLOSTAR) 100 UNIT/ML pen Inject 40 Units Subcutaneous At Bedtime 45 mL 1     insulin pen needle (NOVOFINE) 32G X 6 MM miscellaneous Use with insulin pen four times daily or as directed 400 each 11     lisinopril (ZESTRIL) 10 MG tablet Take 1 tablet (10 mg) by mouth daily 90 tablet 3     Multiple Vitamins-Minerals (ICAPS AREDS FORMULA) TABS Take 1 tablet by mouth 2 times daily         Social History     Tobacco Use     Smoking status: Former Smoker     Types: Cigarettes     Quit date: 1994     Years since quittin.2     Smokeless tobacco: Former User     Quit date: 1952   Substance Use Topics     Alcohol use: Yes      "Alcohol/week: 0.0 standard drinks     Comment: 2 beers/week     Drug use: No       Social History     Social History Narrative     Not on file       Allergies   Allergen Reactions     No Known Allergies        No results found for this or any previous visit (from the past 24 hour(s)).         Review of Systems:     General: No fevers or chills  ENT: No upper respiratory symptoms.  COVID screening questions are negative.  Respiratory: No cough or shortness of breath  Cardiovascular: No chest pain or palpitations           Physical Exam:     Vitals:    08/01/22 1105 08/01/22 1107   BP: (!) 162/66 127/74   Pulse: 61    Resp: 18    Temp: 97.4  F (36.3  C)    SpO2: 96%    Weight: 84.8 kg (187 lb)    Height: 1.803 m (5' 11\")      Body mass index is 26.08 kg/m .    General: Alert,   in no acute distress  HEENT: Head is free of trauma.   Sclerae non-icteric. PERRL, Moist oral mucus membranes, no tonsilar hypertrophy or exudate. TM's white with normal bony and light  Resp: Clear to auscultation bilaterally.  Fair air exchange bilaterally.  CV: Heart tones are quiet.  Regular rate and rhythm  Abd: Soft, non-tender.  Ext: Warm.  No significant edema  Skin: exposed skin free of rash  Psych: Mood appropriate   Neuro: Depends on his son for discussion of medications and dosing        Assessment and Plan   1. High priority for 2019-nCoV vaccine  Patient elected a COVID booster today.  We discussed the risks and benefits of the vaccine, and he consented to vaccine administration.  He was given a written vaccine information handout.    - COVID-19,PF,PFIZER (12+ Yrs GRAY LABEL)    2. Type 2 diabetes mellitus with microalbuminuria, with long-term current use of insulin (H)  A1c on 7/11/2022 was 6.6%  Goal 8% or less    Review of continuous glucose monitoring data shows good control at home.    Patient the family elected continue his current medication regimen:  Jardiance 25 mg daily  Lantus insulin 40 units once daily    Will not " restart metformin as he has had recurrent issues with significant loose stools    Will try once weekly exenatide if any issues come up with hypoglycemia, taking lantus too often or not daily due to cognitive decline    Repeat A1c in October 2022    3. Chronic kidney disease, stage 3a (H)  Recently stable  Plan to repeat basic metabolic profile in early October 2022    4. Primary hypertension  Well-controlled  Continue current medication regimen    5. Loose stools  Resolved with discontinuation of metformin  Plan to not restart metformin      Follow up: Early October 2022  Options for treatment and follow-up care were reviewed with the patient and/or guardian. Elie Martinez and/or guardian engaged in the decision making process and verbalized understanding of the options discussed and agreed with the final plan.    Jhon Gutierrez MD  Faculty - [facility]

## 2022-09-10 ENCOUNTER — HEALTH MAINTENANCE LETTER (OUTPATIENT)
Age: 87
End: 2022-09-10

## 2022-09-19 DIAGNOSIS — E11.65 TYPE 2 DIABETES MELLITUS WITH HYPERGLYCEMIA, WITH LONG-TERM CURRENT USE OF INSULIN (H): ICD-10-CM

## 2022-09-19 DIAGNOSIS — Z79.4 TYPE 2 DIABETES MELLITUS WITH HYPERGLYCEMIA, WITH LONG-TERM CURRENT USE OF INSULIN (H): ICD-10-CM

## 2022-09-19 NOTE — TELEPHONE ENCOUNTER
PRESCRIPTIONS MUST BE WRITTEN THIS WAY TO MAKE THEM MEDICARE COMPLIANT.    Freestyle Juan 2 Sensors  SIG:  Change q 14 days  QTY:  2  Refills:  5      -Prescription must be written after the clinical note date and will only be able to be used for 6 months from the date of the clinical notes. (We will be requesting new clinical notes and prescriptions every 6 months to meet Medicare Guidelines.)    All Documents (including clinical notes) MUST be signed by the same doctor.    Please contact us at [phone] (this number is for clinics only) with any questions. Patients may call us at [phone].

## 2022-12-15 ENCOUNTER — MYC MEDICAL ADVICE (OUTPATIENT)
Dept: FAMILY MEDICINE | Facility: CLINIC | Age: 87
End: 2022-12-15

## 2022-12-15 DIAGNOSIS — Z79.4 TYPE 2 DIABETES MELLITUS WITH HYPERGLYCEMIA, WITH LONG-TERM CURRENT USE OF INSULIN (H): ICD-10-CM

## 2022-12-15 DIAGNOSIS — E11.65 TYPE 2 DIABETES MELLITUS WITH HYPERGLYCEMIA, WITH LONG-TERM CURRENT USE OF INSULIN (H): ICD-10-CM

## 2022-12-15 RX ORDER — INSULIN GLARGINE 100 [IU]/ML
40 INJECTION, SOLUTION SUBCUTANEOUS AT BEDTIME
Qty: 45 ML | Refills: 3 | Status: SHIPPED | OUTPATIENT
Start: 2022-12-15

## 2023-01-22 ENCOUNTER — HEALTH MAINTENANCE LETTER (OUTPATIENT)
Age: 88
End: 2023-01-22

## 2023-02-06 ENCOUNTER — OFFICE VISIT (OUTPATIENT)
Dept: FAMILY MEDICINE | Facility: CLINIC | Age: 88
End: 2023-02-06
Payer: MEDICARE

## 2023-02-06 VITALS
RESPIRATION RATE: 18 BRPM | SYSTOLIC BLOOD PRESSURE: 136 MMHG | TEMPERATURE: 98.7 F | BODY MASS INDEX: 27.34 KG/M2 | DIASTOLIC BLOOD PRESSURE: 74 MMHG | OXYGEN SATURATION: 96 % | HEART RATE: 90 BPM | WEIGHT: 196 LBS

## 2023-02-06 DIAGNOSIS — Z79.4 TYPE 2 DIABETES MELLITUS WITH MICROALBUMINURIA, WITH LONG-TERM CURRENT USE OF INSULIN (H): ICD-10-CM

## 2023-02-06 DIAGNOSIS — R80.9 TYPE 2 DIABETES MELLITUS WITH MICROALBUMINURIA, WITH LONG-TERM CURRENT USE OF INSULIN (H): ICD-10-CM

## 2023-02-06 DIAGNOSIS — E11.29 TYPE 2 DIABETES MELLITUS WITH MICROALBUMINURIA, WITH LONG-TERM CURRENT USE OF INSULIN (H): ICD-10-CM

## 2023-02-06 DIAGNOSIS — N18.31 CHRONIC KIDNEY DISEASE, STAGE 3A (H): ICD-10-CM

## 2023-02-06 DIAGNOSIS — C61 PROSTATE CANCER (H): ICD-10-CM

## 2023-02-06 LAB
ANION GAP SERPL CALCULATED.3IONS-SCNC: 12 MMOL/L (ref 3–14)
BUN SERPL-MCNC: 24 MG/DL (ref 7–30)
CALCIUM SERPL-MCNC: 9.5 MG/DL (ref 8.5–10.1)
CHLORIDE BLD-SCNC: 107 MMOL/L (ref 94–109)
CHOLEST SERPL-MCNC: 119 MG/DL
CO2 SERPL-SCNC: 24 MMOL/L (ref 20–32)
CREAT SERPL-MCNC: 1.5 MG/DL (ref 0.66–1.25)
GFR SERPL CREATININE-BSD FRML MDRD: 44 ML/MIN/1.73M2
GLUCOSE BLD-MCNC: 254 MG/DL (ref 70–99)
HBA1C MFR BLD: 8.2 % (ref 0–5.6)
HDLC SERPL-MCNC: 35 MG/DL
LDLC SERPL CALC-MCNC: 59 MG/DL
NONHDLC SERPL-MCNC: 84 MG/DL
POTASSIUM BLD-SCNC: 4.9 MMOL/L (ref 3.4–5.3)
SODIUM SERPL-SCNC: 143 MMOL/L (ref 133–144)
TRIGL SERPL-MCNC: 123 MG/DL

## 2023-02-06 PROCEDURE — 83036 HEMOGLOBIN GLYCOSYLATED A1C: CPT | Performed by: FAMILY MEDICINE

## 2023-02-06 PROCEDURE — 99214 OFFICE O/P EST MOD 30 MIN: CPT | Performed by: FAMILY MEDICINE

## 2023-02-06 PROCEDURE — 36415 COLL VENOUS BLD VENIPUNCTURE: CPT | Performed by: FAMILY MEDICINE

## 2023-02-06 PROCEDURE — 80048 BASIC METABOLIC PNL TOTAL CA: CPT | Performed by: FAMILY MEDICINE

## 2023-02-06 PROCEDURE — 80061 LIPID PANEL: CPT | Performed by: FAMILY MEDICINE

## 2023-02-06 NOTE — PATIENT INSTRUCTIONS
Type 2 diabetes:  From what I can gather from your home glucose monitor your blood sugars are under good control, and you are not experiencing any low blood sugar problems.  Continue your current Lantus insulin, 40 units daily  Jardiance 25 mg daily    An A1c was obtained today, your goal A1c is 8.0% or less    Stage III chronic kidney disease:  Your baseline creatinine is typically 1.3-1.5.  Kidney function test was obtained today both the blood test and a urine test for protein    Hypertension:  Blood pressures well controlled continue lisinopril 10 mg a day    History of stroke:  Continue atorvastatin, aspirin, blood pressure control, and blood sugar control to help reduce your risk of future stroke    Continue to stay as active as possible.  Make healthy diet choices.  Do activities which engage your mind such as reading, games, crossword puzzles, etc.    Return to see Dr. Gutierrez in about 3 months.

## 2023-02-06 NOTE — PROGRESS NOTES
HPI:   Elie Martinez is a 89 year old  male who presents for:    Chief Complaint   Patient presents with     Diabetes     DM check      Recheck Medication     Need refill            Type 2 diabetes mellitus with microalbuminuria, with long-term current use of insulin (H)  A1c on 7/11/2022 was 6.6%  Goal 8% or less    Here with daughter Raghu today.  Freestyle godfrey 2 CGM: glucoses running mostly 120's to 240's.  Scanning his CGM to his monitor once every couple days.  Sometimes above 250, with dietary indiscretion.  Sometimes has donuts or other sweet snacks.  No polyuria or polydipsia.  Feels his weight is stable.  No hypoglycemic readings or symptoms.     His son or his daughter comes to his apartment every day and checks up on his medication management.  He takes his Lantus each evening and keeps the pen near his chair.  He has not had any accidental extra doses, or missed the dose of Lantus according to the patient and his son today.  They are using a pill dispenser which has been helpful for the remainder of his medications.    Review of continuous glucose monitoring data shows good control at home.     Patient the family elected continue his current medication regimen:  Jardiance 25 mg daily  Lantus insulin 40 units once daily     Will not restart metformin as he has had recurrent issues with significant loose stools     Repeat A1c today     Chronic kidney disease, stage 3a (H)  No difficulty passing urine.  No nocturia.  Never has to get up at night to urinate.  Recently stable  Repeat A1c today.    History of Prostate Cancer  Had radiation and lupron treatment in his late 70's, about 12 years ago.  No urinary symptoms. No gross hematuria.       Hypertension  Well-controlled  Continue current medication regimen      Loose stools  Resolved with discontinuation of metformin  Plan to not restart metformin        Type 2 diabetes mellitus:  Last A1c 7 months ago was 6.6%.  He was experiencing some  "hypoglycemia.  Insulin dose was decreased.  Continuous glucose monitoring has been very helpful to monitor for hypoglycemia.  He does often need reminders from his children to scan his sensor to log his readings.  He has not had any hypoglycemic episodes since his last visit.  He denies any polyuria or polydipsia.  Feels his weight is stable.  He eats mainly preprepared meals that his children drop off for him and he heats up in the microwave.  He does have a \"sweet tooth\".  He does like sweet snacks and some foods like donuts and notices his blood sugars go up with these choices.      Stage III chronic kidney disease: No change in urinary habits.  He has no trouble with urination.  Strong urinary stream.  He does not get up at night to urinate.  No hematuria.  Does not use NSAIDs.    History of prostate cancer: No lower urinary tract symptoms as outlined above           PMHX:     Patient Active Problem List   Diagnosis     Long-term insulin use in type 2 diabetes (H)     Cerebral vascular disease     S/P carotid endarterectomy     Hypertension     Prostate cancer (H)     Hyperlipidemia LDL goal <100     Chronic kidney disease, stage 3a (H)     Type 2 diabetes mellitus with hyperglycemia, with long-term current use of insulin (H)       Current Outpatient Medications   Medication Sig Dispense Refill     aspirin 325 MG tablet Take 1 tablet (325 mg) by mouth daily 90 tablet 3     atorvastatin (LIPITOR) 40 MG tablet Take 1 tablet (40 mg) by mouth daily 90 tablet 3     blood glucose (FREESTYLE TEST STRIPS) test strip Use to test blood sugar 3 times daily or as directed. 300 strip 11     blood glucose (NO BRAND SPECIFIED) lancets standard Use to test blood sugar 3 times daily or as directed. 100 each 11     blood glucose (NO BRAND SPECIFIED) lancets standard Use to test blood sugar 3 times daily or as directed. 300 each 3     blood glucose (NO BRAND SPECIFIED) test strip Use to test blood sugars 3 times daily or as " directed 300 each 3     blood glucose monitoring (FREESTYLE FREEDOM LITE) meter device kit Use to test blood sugar 3 times daily or as directed. 1 kit 0     blood glucose monitoring (NO BRAND SPECIFIED) meter device kit Use to test blood sugar 3 times daily or as directed. 1 kit 0     Continuous Blood Gluc Sensor (FREESTYLE RADHA 2 SENSOR) MISC 1 each every 14 days Use 1 sensor every 14 days. Use to read blood sugars per 's instructions. 2 each 5     empagliflozin (JARDIANCE) 25 MG TABS tablet Take 1 tablet (25 mg) by mouth daily 90 tablet 3     insulin glargine (LANTUS SOLOSTAR) 100 UNIT/ML pen Inject 40 Units Subcutaneous At Bedtime 45 mL 3     insulin pen needle (NOVOFINE) 32G X 6 MM miscellaneous Use with insulin pen four times daily or as directed 400 each 11     lisinopril (ZESTRIL) 10 MG tablet Take 1 tablet (10 mg) by mouth daily 90 tablet 3     Multiple Vitamins-Minerals (ICAPS AREDS FORMULA) TABS Take 1 tablet by mouth 2 times daily         Social History     Tobacco Use     Smoking status: Former     Types: Cigarettes     Quit date: 1994     Years since quittin.7     Smokeless tobacco: Former     Quit date: 1952   Substance Use Topics     Alcohol use: Yes     Alcohol/week: 0.0 standard drinks     Comment: 2 beers/week     Drug use: No       Social History     Social History Narrative     Not on file       Allergies   Allergen Reactions     No Known Allergies        No results found for this or any previous visit (from the past 24 hour(s)).         Review of Systems:     General: Weight has been stable  No recent illness  ENT: No upper respiratory symptoms.  COVID screening questions are negative  Respiratory: No cough or shortness of breath  Cardiovascular: No palpitations or chest pain.  No orthostatic symptoms  GI: No stool changes  : No lower urinary tract symptoms.  No gross hematuria           Physical Exam:     Vitals:    23 1153   BP: 136/74   Pulse: 90   Resp: 18    Temp: 98.7  F (37.1  C)   TempSrc: Oral   SpO2: 96%   Weight: 88.9 kg (196 lb)     Body mass index is 27.34 kg/m .    General: Alert, in no acute distress  HEENT: Head is free of trauma.   Sclerae non-icteric. PERRL, Moist oral mucus membranes, no tonsilar hypertrophy or exudate.  Resp: Clear to auscultation bilaterally.  Fair air exchange bilaterally  CV: Heart tones are quiet.  Regular rate and rhythm  Abd: Soft, non-tender.  Ext: Warm.  No significant peripheral edema  Skin: exposed skin free of rash  Psych: Mood appropriate       Results for orders placed or performed in visit on 02/06/23   Hemoglobin A1c     Status: Abnormal   Result Value Ref Range    Hemoglobin A1C 8.2 (H) 0.0 - 5.6 %   Basic metabolic panel     Status: Abnormal   Result Value Ref Range    Sodium 143 133 - 144 mmol/L    Potassium 4.9 3.4 - 5.3 mmol/L    Chloride 107 94 - 109 mmol/L    Carbon Dioxide (CO2) 24 20 - 32 mmol/L    Anion Gap 12 3 - 14 mmol/L    Urea Nitrogen 24 7 - 30 mg/dL    Creatinine 1.50 (H) 0.66 - 1.25 mg/dL    Calcium 9.5 8.5 - 10.1 mg/dL    Glucose 254 (H) 70 - 99 mg/dL    GFR Estimate 44 (L) >60 mL/min/1.73m2   Lipid Profile     Status: Abnormal   Result Value Ref Range    Cholesterol 119 <200 mg/dL    Triglycerides 123 <150 mg/dL    Direct Measure HDL 35 (L) >=40 mg/dL    LDL Cholesterol Calculated 59 <=100 mg/dL    Non HDL Cholesterol 84 <130 mg/dL    Narrative    Cholesterol  Desirable:  <200 mg/dL    Triglycerides  Normal:  Less than 150 mg/dL  Borderline High:  150-199 mg/dL  High:  200-499 mg/dL  Very High:  Greater than or equal to 500 mg/dL    Direct Measure HDL  Female:  Greater than or equal to 50 mg/dL   Male:  Greater than or equal to 40 mg/dL    LDL Cholesterol  Desirable:  <100mg/dL  Above Desirable:  100-129 mg/dL   Borderline High:  130-159 mg/dL   High:  160-189 mg/dL   Very High:  >= 190 mg/dL    Non HDL Cholesterol  Desirable:  130 mg/dL  Above Desirable:  130-159 mg/dL  Borderline High:  160-189  mg/dL  High:  190-219 mg/dL  Very High:  Greater than or equal to 220 mg/dL         Assessment and Plan   1. Type 2 diabetes mellitus with microalbuminuria, with long-term current use of insulin (H)  A1c 8.2%, up from 6.6% 7 months ago  No longer experiencing hypoglycemia, continuous glucose monitoring has been necessary to monitor for hypoglycemia with Lantus  Suspect dietary indiscretions are responsible for his glucose spikes which have brought his A1c up above 8%  Technically goal 8.5% or less, but I feel he can keep his A1c less than 8.0% with some slight dietary modifications    Continue Lantus 40 units daily  Jardiance 25 mg daily    Cautious to keep his medication regimen simple due to his cognitive decline of aging.  Continue continuous glucose monitoring, not only has been helpful in monitoring for hypoglycemia, but now that his glucoses are increasing we will help him monitor dietary choices    Will try once weekly exenatide if any issues come up with hypoglycemia, taking lantus too often or not daily due to cognitive decline    - Hemoglobin A1c; Future  - Basic metabolic panel; Future  - Lipid Profile; Future  - Albumin Random Urine Quantitative with Creat Ratio; Future  - Hemoglobin A1c  - Basic metabolic panel  - Lipid Profile    2. Prostate cancer (H)  Status post treatment in [place] about 12 years ago, radiation and what sounds like Lupron treatment  No alert lower urinary tract symptoms  Continue to monitor    3. Chronic kidney disease, stage 3a (H)  Stable  Stage III chronic kidney disease:  Your baseline creatinine is typically 1.3-1.5.  Kidney function test was obtained today both the blood test and a urine test for protein    - Basic metabolic panel; Future  - Basic metabolic panel    4.  History of stroke  Continue secondary prophylaxis with atorvastatin, aspirin, blood pressure control and glycemic control        Patient instructions:  Type 2 diabetes:  From what I can gather from your home  glucose monitor your blood sugars are under good control, and you are not experiencing any low blood sugar problems.  Continue your current Lantus insulin, 40 units daily  Jardiance 25 mg daily    An A1c was obtained today, your goal A1c is 8.0% or less    Stage III chronic kidney disease:  Your baseline creatinine is typically 1.3-1.5.  Kidney function test was obtained today both the blood test and a urine test for protein    Hypertension:  Blood pressures well controlled continue lisinopril 10 mg a day    History of stroke:  Continue atorvastatin, aspirin, blood pressure control, and blood sugar control to help reduce your risk of future stroke    Continue to stay as active as possible.  Make healthy diet choices.  Do activities which engage your mind such as reading, games, crossword puzzles, etc.    Return to see Dr. Gutierrez in about 3 months.      Follow up: 3 months, earlier as needed  Options for treatment and follow-up care were reviewed with the patient and/or guardian. Elie Martinez and/or guardian engaged in the decision making process and verbalized understanding of the options discussed and agreed with the final plan.    Jhon Gutierrez MD  Faculty - [facility]

## 2023-02-07 DIAGNOSIS — E11.65 TYPE 2 DIABETES MELLITUS WITH HYPERGLYCEMIA, WITH LONG-TERM CURRENT USE OF INSULIN (H): ICD-10-CM

## 2023-02-07 DIAGNOSIS — Z79.4 TYPE 2 DIABETES MELLITUS WITH HYPERGLYCEMIA, WITH LONG-TERM CURRENT USE OF INSULIN (H): ICD-10-CM

## 2023-02-07 NOTE — TELEPHONE ENCOUNTER
PRESCRIPTIONS MUST BE WRITTEN THIS WAY TO MAKE THEM MEDICARE COMPLIANT:      FREESTYLE RADHA 2 SENSORS  SIG: Change every 14 days  QTY: 2 REFILLS: 5     -Prescriptions must be written after the clinical note date and will only be able to be used for 6 months from the date of the clinial notes. (We will be requesteding new clinical notes and prescriptions every 6 months to meet Medicare Guidelines.      CERTIFICATE OF MEDICAL NECESSITY REQUIREMENTS:     -Is needed every time the patient is trying to get a new .      -Must be filled out completely to be compliant.     ALL DOCUMENTS (INCLUDING CLINICAL NOTES) MUST BE SIGNED BY THE SAME DOCTOR     Please contact us at [phone] (this number is for clinics only) with any questions. Patients may call us at [phone].     Sincerely,   [facility]

## 2023-02-07 NOTE — RESULT ENCOUNTER NOTE
A1c has increased to 8.2%, but is still within goal level.  Reduce sweet snacks a bit and continue current medications, and I expect you will be able to get A1c under 8.0%    Your kidney function is not in the normal range, but is stable    Return for a repeat A1c and kidney function test in 3 months

## 2023-02-13 NOTE — CONFIDENTIAL NOTE
"       HPI:   Elie Martinez is a 89 year old  male who presents for:    Chief Complaint   Patient presents with     Diabetes     DM check      Recheck Medication     Need refill    He presents to clinic today with his daughter who assists with his care.  He has visited by his children on a daily basis.  His children help with medication management.      Type 2 diabetes mellitus:  Last A1c 7 months ago was 6.6%.  He was experiencing some hypoglycemia.  Insulin dose was decreased.  Continuous glucose monitoring has been very helpful to monitor for hypoglycemia.  He does often need reminders from his children to scan his sensor to log his readings.  He has not had any hypoglycemic episodes since his last visit.  He denies any polyuria or polydipsia.  Feels his weight is stable.  He eats mainly preprepared meals that his children drop off for him and he heats up in the microwave.  He does have a \"sweet tooth\".  He does like sweet snacks and some foods like donuts and notices his blood sugars go up with these choices.      Stage III chronic kidney disease: No change in urinary habits.  He has no trouble with urination.  Strong urinary stream.  He does not get up at night to urinate.  No hematuria.  Does not use NSAIDs.    History of prostate cancer: No lower urinary tract symptoms as outlined above  Neck         PMHX:     Patient Active Problem List   Diagnosis     Long-term insulin use in type 2 diabetes (H)     Cerebral vascular disease     S/P carotid endarterectomy     Hypertension     Prostate cancer (H)     Hyperlipidemia LDL goal <100     Chronic kidney disease, stage 3a (H)     Type 2 diabetes mellitus with hyperglycemia, with long-term current use of insulin (H)       Current Outpatient Medications   Medication Sig Dispense Refill     aspirin 325 MG tablet Take 1 tablet (325 mg) by mouth daily 90 tablet 3     atorvastatin (LIPITOR) 40 MG tablet Take 1 tablet (40 mg) by mouth daily 90 tablet 3     blood glucose " (FREESTYLE TEST STRIPS) test strip Use to test blood sugar 3 times daily or as directed. 300 strip 11     blood glucose (NO BRAND SPECIFIED) lancets standard Use to test blood sugar 3 times daily or as directed. 100 each 11     blood glucose (NO BRAND SPECIFIED) lancets standard Use to test blood sugar 3 times daily or as directed. 300 each 3     blood glucose (NO BRAND SPECIFIED) test strip Use to test blood sugars 3 times daily or as directed 300 each 3     blood glucose monitoring (FREESTYLE FREEDOM LITE) meter device kit Use to test blood sugar 3 times daily or as directed. 1 kit 0     blood glucose monitoring (NO BRAND SPECIFIED) meter device kit Use to test blood sugar 3 times daily or as directed. 1 kit 0     Continuous Blood Gluc Sensor (FREESTYLE RADHA 2 SENSOR) MISC 1 each every 14 days Use 1 sensor every 14 days. Use to read blood sugars per 's instructions. 2 each 5     empagliflozin (JARDIANCE) 25 MG TABS tablet Take 1 tablet (25 mg) by mouth daily 90 tablet 3     insulin glargine (LANTUS SOLOSTAR) 100 UNIT/ML pen Inject 40 Units Subcutaneous At Bedtime 45 mL 3     insulin pen needle (NOVOFINE) 32G X 6 MM miscellaneous Use with insulin pen four times daily or as directed 400 each 11     lisinopril (ZESTRIL) 10 MG tablet Take 1 tablet (10 mg) by mouth daily 90 tablet 3     Multiple Vitamins-Minerals (ICAPS AREDS FORMULA) TABS Take 1 tablet by mouth 2 times daily         Social History     Tobacco Use     Smoking status: Former     Types: Cigarettes     Quit date: 1994     Years since quittin.7     Smokeless tobacco: Former     Quit date: 1952   Substance Use Topics     Alcohol use: Yes     Alcohol/week: 0.0 standard drinks     Comment: 2 beers/week     Drug use: No       Social History     Social History Narrative     Not on file       Allergies   Allergen Reactions     No Known Allergies        No results found for this or any previous visit (from the past 24 hour(s)).          Review of Systems:     General: Weight has been stable  No recent illness  ENT: No upper respiratory symptoms.  COVID screening questions are negative  Respiratory: No cough or shortness of breath  Cardiovascular: No palpitations or chest pain.  No orthostatic symptoms  GI: No stool changes  : No lower urinary tract symptoms.  No gross hematuria           Physical Exam:     Vitals:    02/06/23 1153   BP: 136/74   Pulse: 90   Resp: 18   Temp: 98.7  F (37.1  C)   TempSrc: Oral   SpO2: 96%   Weight: 88.9 kg (196 lb)     Body mass index is 27.34 kg/m .    General: Alert, in no acute distress  HEENT: Head is free of trauma.   Sclerae non-icteric. PERRL, Moist oral mucus membranes, no tonsilar hypertrophy or exudate.  Resp: Clear to auscultation bilaterally.  Fair air exchange bilaterally  CV: Heart tones are quiet.  Regular rate and rhythm  Abd: Soft, non-tender.  Ext: Warm.  No significant peripheral edema  Skin: exposed skin free of rash  Psych: Mood appropriate       Results for orders placed or performed in visit on 02/06/23   Hemoglobin A1c     Status: Abnormal   Result Value Ref Range    Hemoglobin A1C 8.2 (H) 0.0 - 5.6 %   Basic metabolic panel     Status: Abnormal   Result Value Ref Range    Sodium 143 133 - 144 mmol/L    Potassium 4.9 3.4 - 5.3 mmol/L    Chloride 107 94 - 109 mmol/L    Carbon Dioxide (CO2) 24 20 - 32 mmol/L    Anion Gap 12 3 - 14 mmol/L    Urea Nitrogen 24 7 - 30 mg/dL    Creatinine 1.50 (H) 0.66 - 1.25 mg/dL    Calcium 9.5 8.5 - 10.1 mg/dL    Glucose 254 (H) 70 - 99 mg/dL    GFR Estimate 44 (L) >60 mL/min/1.73m2   Lipid Profile     Status: Abnormal   Result Value Ref Range    Cholesterol 119 <200 mg/dL    Triglycerides 123 <150 mg/dL    Direct Measure HDL 35 (L) >=40 mg/dL    LDL Cholesterol Calculated 59 <=100 mg/dL    Non HDL Cholesterol 84 <130 mg/dL    Narrative    Cholesterol  Desirable:  <200 mg/dL    Triglycerides  Normal:  Less than 150 mg/dL  Borderline High:  150-199 mg/dL  High:   200-499 mg/dL  Very High:  Greater than or equal to 500 mg/dL    Direct Measure HDL  Female:  Greater than or equal to 50 mg/dL   Male:  Greater than or equal to 40 mg/dL    LDL Cholesterol  Desirable:  <100mg/dL  Above Desirable:  100-129 mg/dL   Borderline High:  130-159 mg/dL   High:  160-189 mg/dL   Very High:  >= 190 mg/dL    Non HDL Cholesterol  Desirable:  130 mg/dL  Above Desirable:  130-159 mg/dL  Borderline High:  160-189 mg/dL  High:  190-219 mg/dL  Very High:  Greater than or equal to 220 mg/dL         Assessment and Plan   1. Type 2 diabetes mellitus with microalbuminuria, with long-term current use of insulin (H)  A1c 8.2%, up from 6.6% 7 months ago  No longer experiencing hypoglycemia, continuous glucose monitoring has been necessary to monitor for hypoglycemia with Lantus  Suspect dietary indiscretions are responsible for his glucose spikes which have brought his A1c up above 8%  Technically goal 8.5% or less, but I feel he can keep his A1c less than 8.0% with some slight dietary modifications    Continue Lantus 40 units daily  Jardiance 25 mg daily    Cautious to keep his medication regimen simple due to his cognitive decline of aging.  Continue continuous glucose monitoring, not only has been helpful in monitoring for hypoglycemia, but now that his glucoses are increasing we will help him monitor dietary choices    Could consider a weekly injectable GLP if daily Lantus becomes too difficult to manage    - Hemoglobin A1c; Future  - Basic metabolic panel; Future  - Lipid Profile; Future  - Albumin Random Urine Quantitative with Creat Ratio; Future  - Hemoglobin A1c  - Basic metabolic panel  - Lipid Profile    2. Prostate cancer (H)  Status post treatment in [place] about 12 years ago, radiation and what sounds like Lupron treatment  No alert lower urinary tract symptoms  Continue to monitor    3. Chronic kidney disease, stage 3a (H)  Stable  Stage III chronic kidney disease:  Your baseline creatinine  is typically 1.3-1.5.  Kidney function test was obtained today both the blood test and a urine test for protein    - Basic metabolic panel; Future  - Basic metabolic panel    4.  History of stroke  Continue secondary prophylaxis with atorvastatin, aspirin, blood pressure control and glycemic control      Type 2 diabetes:  From what I can gather from your home glucose monitor your blood sugars are under good control, and you are not experiencing any low blood sugar problems.  Continue your current Lantus insulin, 40 units daily  Jardiance 25 mg daily    An A1c was obtained today, your goal A1c is 8.0% or less    Stage III chronic kidney disease:  Your baseline creatinine is typically 1.3-1.5.  Kidney function test was obtained today both the blood test and a urine test for protein    Hypertension:  Blood pressures well controlled continue lisinopril 10 mg a day    History of stroke:  Continue atorvastatin, aspirin, blood pressure control, and blood sugar control to help reduce your risk of future stroke    Continue to stay as active as possible.  Make healthy diet choices.  Do activities which engage your mind such as reading, games, crossword puzzles, etc.    Return to see Dr. Gutierrez in about 3 months.      Follow up: 3 months, earlier as needed  Options for treatment and follow-up care were reviewed with the patient and/or guardian. Elie ROBBY Martinez and/or guardian engaged in the decision making process and verbalized understanding of the options discussed and agreed with the final plan.    Jhon Gutierrez MD  Faculty - [facility]

## 2023-05-30 ENCOUNTER — TELEPHONE (OUTPATIENT)
Dept: FAMILY MEDICINE | Facility: CLINIC | Age: 88
End: 2023-05-30
Payer: MEDICARE

## 2023-05-30 DIAGNOSIS — I10 ESSENTIAL HYPERTENSION: ICD-10-CM

## 2023-05-30 NOTE — TELEPHONE ENCOUNTER
[facility] phone call message- medication clarification/question:    Full Medication Name: lisinopril (ZESTRIL) 10 MG tablet    Question: Daughter called requesting for refill on medication, if able to fill please send to pharmacy thank you.    Pharmacy confirmed as    [facility] - [place], [place] - [address] [place] [address] ROAD: Yes    OK to leave a message on voice mail? Yes    Primary language: English      needed? No    Call taken on May 30, 2023 at 9:48 AM by Lisbeth Deleon

## 2023-06-03 ENCOUNTER — HEALTH MAINTENANCE LETTER (OUTPATIENT)
Age: 88
End: 2023-06-03

## 2023-06-03 RX ORDER — LISINOPRIL 10 MG/1
10 TABLET ORAL DAILY
Qty: 90 TABLET | Refills: 3 | Status: SHIPPED | OUTPATIENT
Start: 2023-06-03

## 2023-06-03 NOTE — TELEPHONE ENCOUNTER
Lisinopril refilled.  It has been 4 months since last visit, follow up recommended at 3 months - please call and schedule patient.  DAISHA Gutierrez MD

## 2023-06-16 ENCOUNTER — TRANSFERRED RECORDS (OUTPATIENT)
Dept: MULTI SPECIALTY CLINIC | Facility: CLINIC | Age: 88
End: 2023-06-16

## 2023-06-16 LAB — RETINOPATHY: NORMAL

## 2023-06-21 DIAGNOSIS — Z79.4 TYPE 2 DIABETES MELLITUS WITH STAGE 2 CHRONIC KIDNEY DISEASE, WITH LONG-TERM CURRENT USE OF INSULIN (H): ICD-10-CM

## 2023-06-21 DIAGNOSIS — E11.22 TYPE 2 DIABETES MELLITUS WITH STAGE 2 CHRONIC KIDNEY DISEASE, WITH LONG-TERM CURRENT USE OF INSULIN (H): ICD-10-CM

## 2023-06-21 DIAGNOSIS — N18.2 TYPE 2 DIABETES MELLITUS WITH STAGE 2 CHRONIC KIDNEY DISEASE, WITH LONG-TERM CURRENT USE OF INSULIN (H): ICD-10-CM

## 2023-06-21 RX ORDER — PEN NEEDLE, DIABETIC 32 GX 1/4"
NEEDLE, DISPOSABLE MISCELLANEOUS
Qty: 360 EACH | Refills: 3 | Status: SHIPPED | OUTPATIENT
Start: 2023-06-21

## 2023-06-21 NOTE — TELEPHONE ENCOUNTER
[facility] phone call message- medication clarification/question:    Full Medication Name:  insulin pen needle 32G X 6 MM         Question: Patient is asking for refill for medication above. Patient via Birdi message. Please call and advise, if needed. Thank you    Pharmacy confirmed as      [facility] - [place], [place] - [address] [place] [address] ROAD: Yes    OK to leave a message on voice mail? Yes    Primary language: English      needed? No    Call taken on June 21, 2023 at 9:11 AM by Tae Oleary

## 2023-06-26 ENCOUNTER — OFFICE VISIT (OUTPATIENT)
Dept: FAMILY MEDICINE | Facility: CLINIC | Age: 88
End: 2023-06-26
Payer: MEDICARE

## 2023-06-26 VITALS
HEIGHT: 69 IN | RESPIRATION RATE: 18 BRPM | BODY MASS INDEX: 26.36 KG/M2 | DIASTOLIC BLOOD PRESSURE: 72 MMHG | WEIGHT: 178 LBS | HEART RATE: 87 BPM | OXYGEN SATURATION: 94 % | SYSTOLIC BLOOD PRESSURE: 118 MMHG

## 2023-06-26 DIAGNOSIS — E11.29 TYPE 2 DIABETES MELLITUS WITH MICROALBUMINURIA, WITH LONG-TERM CURRENT USE OF INSULIN (H): ICD-10-CM

## 2023-06-26 DIAGNOSIS — Z98.890 S/P CAROTID ENDARTERECTOMY: ICD-10-CM

## 2023-06-26 DIAGNOSIS — I10 PRIMARY HYPERTENSION: ICD-10-CM

## 2023-06-26 DIAGNOSIS — R80.9 TYPE 2 DIABETES MELLITUS WITH MICROALBUMINURIA, WITH LONG-TERM CURRENT USE OF INSULIN (H): ICD-10-CM

## 2023-06-26 DIAGNOSIS — Z79.4 TYPE 2 DIABETES MELLITUS WITH MICROALBUMINURIA, WITH LONG-TERM CURRENT USE OF INSULIN (H): ICD-10-CM

## 2023-06-26 DIAGNOSIS — N18.31 CHRONIC KIDNEY DISEASE, STAGE 3A (H): ICD-10-CM

## 2023-06-26 DIAGNOSIS — Z79.4 TYPE 2 DIABETES MELLITUS WITH HYPERGLYCEMIA, WITH LONG-TERM CURRENT USE OF INSULIN (H): Primary | ICD-10-CM

## 2023-06-26 DIAGNOSIS — I67.9 CEREBRAL VASCULAR DISEASE: ICD-10-CM

## 2023-06-26 DIAGNOSIS — R41.81 AGE-RELATED COGNITIVE DECLINE: ICD-10-CM

## 2023-06-26 DIAGNOSIS — R06.09 DOE (DYSPNEA ON EXERTION): ICD-10-CM

## 2023-06-26 DIAGNOSIS — E11.22 TYPE 2 DIABETES MELLITUS WITH STAGE 2 CHRONIC KIDNEY DISEASE, WITH LONG-TERM CURRENT USE OF INSULIN (H): ICD-10-CM

## 2023-06-26 DIAGNOSIS — E11.65 TYPE 2 DIABETES MELLITUS WITH HYPERGLYCEMIA, WITH LONG-TERM CURRENT USE OF INSULIN (H): Primary | ICD-10-CM

## 2023-06-26 DIAGNOSIS — Z79.4 TYPE 2 DIABETES MELLITUS WITH STAGE 2 CHRONIC KIDNEY DISEASE, WITH LONG-TERM CURRENT USE OF INSULIN (H): ICD-10-CM

## 2023-06-26 DIAGNOSIS — N18.2 TYPE 2 DIABETES MELLITUS WITH STAGE 2 CHRONIC KIDNEY DISEASE, WITH LONG-TERM CURRENT USE OF INSULIN (H): ICD-10-CM

## 2023-06-26 LAB
ANION GAP SERPL CALCULATED.3IONS-SCNC: 14 MMOL/L (ref 7–15)
BUN SERPL-MCNC: 30.9 MG/DL (ref 8–23)
CALCIUM SERPL-MCNC: 10 MG/DL (ref 8.2–9.6)
CHLORIDE SERPL-SCNC: 101 MMOL/L (ref 98–107)
CREAT SERPL-MCNC: 1.47 MG/DL (ref 0.67–1.17)
DEPRECATED HCO3 PLAS-SCNC: 21 MMOL/L (ref 22–29)
ERYTHROCYTE [DISTWIDTH] IN BLOOD BY AUTOMATED COUNT: 13.2 % (ref 10–15)
GFR SERPL CREATININE-BSD FRML MDRD: 45 ML/MIN/1.73M2
GLUCOSE SERPL-MCNC: 186 MG/DL (ref 70–99)
HBA1C MFR BLD: 10.4 % (ref 0–5.6)
HCT VFR BLD AUTO: 48.6 % (ref 40–53)
HGB BLD-MCNC: 15.8 G/DL (ref 13.3–17.7)
MAGNESIUM SERPL-MCNC: 2 MG/DL (ref 1.7–2.3)
MCH RBC QN AUTO: 29.4 PG (ref 26.5–33)
MCHC RBC AUTO-ENTMCNC: 32.5 G/DL (ref 31.5–36.5)
MCV RBC AUTO: 90 FL (ref 78–100)
NT-PROBNP SERPL-MCNC: 268 PG/ML (ref 0–1800)
PLATELET # BLD AUTO: 293 10E3/UL (ref 150–450)
POTASSIUM SERPL-SCNC: 4.7 MMOL/L (ref 3.4–5.3)
RBC # BLD AUTO: 5.38 10E6/UL (ref 4.4–5.9)
SODIUM SERPL-SCNC: 136 MMOL/L (ref 136–145)
WBC # BLD AUTO: 8.7 10E3/UL (ref 4–11)

## 2023-06-26 PROCEDURE — 99214 OFFICE O/P EST MOD 30 MIN: CPT | Performed by: FAMILY MEDICINE

## 2023-06-26 PROCEDURE — 80048 BASIC METABOLIC PNL TOTAL CA: CPT | Performed by: FAMILY MEDICINE

## 2023-06-26 PROCEDURE — 85027 COMPLETE CBC AUTOMATED: CPT | Performed by: FAMILY MEDICINE

## 2023-06-26 PROCEDURE — 36415 COLL VENOUS BLD VENIPUNCTURE: CPT | Performed by: FAMILY MEDICINE

## 2023-06-26 PROCEDURE — 83735 ASSAY OF MAGNESIUM: CPT | Performed by: FAMILY MEDICINE

## 2023-06-26 PROCEDURE — 83036 HEMOGLOBIN GLYCOSYLATED A1C: CPT | Performed by: FAMILY MEDICINE

## 2023-06-26 PROCEDURE — 83880 ASSAY OF NATRIURETIC PEPTIDE: CPT | Performed by: FAMILY MEDICINE

## 2023-06-26 NOTE — PROGRESS NOTES
HPI:   Elie Martinez is a 89 year old  male who presents for:    Chief Complaint   Patient presents with    Diabetes     Diabetics check up , hard time breathing when walking for couple weeks, DOESN'T eat much.      Type 2 diabetes mellitus:  Glucoses running very high past 3-4 weeks. 250's.  Using CGM, but does not bring his recorder up to the sensor very often, so no recordings.  High alarm goes off multiple times per day. No lows.  Daughter sets up oral meds, so knows he is not missing oral meds. Daughter wonders if he is forgetting Lantus insulin (40 units).  Less active than before. Not walking as much.   Appetite has been variable.  Depends on his family to help preprepared meals.  When he does not have help with meal preparation tends to eat less.  No recent hypoglycemic episodes, but hypoglycemia was a problem in the past.  It is unclear to his daughter who is present at the visit if he is consistently taking his Lantus insulin.  There is some chance that he is missing Lantus insulin doses.    Dyspnea with exertion:  Has progressively become more short of breath with walking over the past couple of years.  Seems worse over the past 3 months.  Gets short of breath with exertion.  Still able to complete his activities of daily living.  No chest pain episodes.  Progression has been subtle.  No specific events.  No significant cough.  No fevers.  No hemoptysis.  No new bruising or bleeding.  Frequently has bruises if he bumps himself unchanged for the last few years.  No blood in the stool or black stool.    Hypertension:  His blood pressure has been well controlled.  Blood pressures have actually been downtrending over the past year.  Reducing his antihypertensive doses.  No dizziness, lightheadedness, orthostatic symptoms.    Cerebrovascular disease:  No new neurologic symptoms.  Continues to have difficulty with memory.  No strength changes.  Continues on atorvastatin and aspirin.    Cognitive decline  of aging:  Depends on his family to help with medication set up.  His family is very involved and help set up all of his medications.  They also preprepared meals for him which she keeps in the freezer.  Is living in an apartment which has plenty of help.             PMHX:     Patient Active Problem List   Diagnosis    Long-term insulin use in type 2 diabetes (H)    Cerebral vascular disease    S/P carotid endarterectomy    Hypertension    Prostate cancer (H)    Hyperlipidemia LDL goal <100    Chronic kidney disease, stage 3a (H)    Type 2 diabetes mellitus with hyperglycemia, with long-term current use of insulin (H)       Current Outpatient Medications   Medication Sig Dispense Refill    aspirin 325 MG tablet Take 1 tablet (325 mg) by mouth daily 90 tablet 3    atorvastatin (LIPITOR) 40 MG tablet Take 1 tablet (40 mg) by mouth daily 90 tablet 3    blood glucose (NO BRAND SPECIFIED) lancets standard Use to test blood sugar 3 times daily or as directed. 100 each 11    blood glucose (NO BRAND SPECIFIED) test strip Use to test blood sugars 3 times daily or as directed 300 each 3    blood glucose monitoring (FREESTYLE FREEDOM LITE) meter device kit Use to test blood sugar 3 times daily or as directed. 1 kit 0    blood glucose monitoring (NO BRAND SPECIFIED) meter device kit Use to test blood sugar 3 times daily or as directed. 1 kit 0    Continuous Blood Gluc Sensor (FREESTYLE RADHA 2 SENSOR) MISC 1 each every 14 days Use 1 sensor every 14 days. Use to read blood sugars per 's instructions. 2 each 5    empagliflozin (JARDIANCE) 25 MG TABS tablet Take 1 tablet (25 mg) by mouth daily 90 tablet 3    insulin glargine (LANTUS SOLOSTAR) 100 UNIT/ML pen Inject 40 Units Subcutaneous At Bedtime 45 mL 3    insulin pen needle (BD PEN NEEDLE MICRO U/F) 32G X 6 MM miscellaneous Use with insulin pen four times daily or as directed 360 each 3    lisinopril (ZESTRIL) 10 MG tablet Take 1 tablet (10 mg) by mouth daily 90  "tablet 3    Multiple Vitamins-Minerals (ICAPS AREDS FORMULA) TABS Take 1 tablet by mouth 2 times daily         Social History     Tobacco Use    Smoking status: Former     Types: Cigarettes     Quit date: 1994     Years since quittin.2    Smokeless tobacco: Former     Quit date: 1952   Substance Use Topics    Alcohol use: Yes     Alcohol/week: 0.0 standard drinks of alcohol     Comment: 2 beers/week    Drug use: No       Social History     Social History Narrative    Not on file       Allergies   Allergen Reactions    No Known Allergies        No results found for this or any previous visit (from the past 24 hour(s)).         Review of Systems:     General: No recent illness.  No fevers  ENT: No upper respiratory symptoms  Respiratory: Short of breath with prolonged walking.  No cough.  Cardiovascular: No chest pain episodes.  No palpitations.  No new leg swelling  GI: Occasional loose stools.  No blood in the stool or black stool  : No change in urinary frequency  Heme: Bruising with minor skin trauma unchanged.  No bleeding.         Physical Exam:     Vitals:    23 1139   BP: 118/72   Pulse: 87   Resp: 18   SpO2: 94%   Weight: 80.7 kg (178 lb)   Height: 1.75 m (5' 8.9\")     Body mass index is 26.36 kg/m .    General: Alert,  in no acute distress  HEENT: Head is free of trauma.   Sclerae non-icteric. PERRL, Moist oral mucus membranes  Resp: Fair air exchange bilaterally.  Clear to auscultation bilaterally  CV: Heart tones are quiet.  Regular rate and rhythm  Abd: Soft, non-tender.  Ext: Warm.  Trace peripheral edema.  Skin: No skin wounds on the feet  Psych: Mood appropriate     Results for orders placed or performed in visit on 23   Basic metabolic panel     Status: Abnormal   Result Value Ref Range    Sodium 136 136 - 145 mmol/L    Potassium 4.7 3.4 - 5.3 mmol/L    Chloride 101 98 - 107 mmol/L    Carbon Dioxide (CO2) 21 (L) 22 - 29 mmol/L    Anion Gap 14 7 - 15 mmol/L    Urea Nitrogen " 30.9 (H) 8.0 - 23.0 mg/dL    Creatinine 1.47 (H) 0.67 - 1.17 mg/dL    Calcium 10.0 (H) 8.2 - 9.6 mg/dL    Glucose 186 (H) 70 - 99 mg/dL    GFR Estimate 45 (L) >60 mL/min/1.73m2   N terminal pro BNP outpatient     Status: Normal   Result Value Ref Range    N Terminal Pro BNP Outpatient 268 0 - 1,800 pg/mL   Hemoglobin A1c     Status: Abnormal   Result Value Ref Range    Hemoglobin A1C 10.4 (H) 0.0 - 5.6 %   Magnesium     Status: Normal   Result Value Ref Range    Magnesium 2.0 1.7 - 2.3 mg/dL   CBC with platelets     Status: Normal   Result Value Ref Range    WBC Count 8.7 4.0 - 11.0 10e3/uL    RBC Count 5.38 4.40 - 5.90 10e6/uL    Hemoglobin 15.8 13.3 - 17.7 g/dL    Hematocrit 48.6 40.0 - 53.0 %    MCV 90 78 - 100 fL    MCH 29.4 26.5 - 33.0 pg    MCHC 32.5 31.5 - 36.5 g/dL    RDW 13.2 10.0 - 15.0 %    Platelet Count 293 150 - 450 10e3/uL           Assessment and Plan     Your blood sugars have been running quite high over the past few weeks.    Our plan is to set aside 14 pen needles for every 2-weeks to more closely observe if any doses of Lantus are being missed.  If doses of Lantus are being missed this could account for the high blood sugar levels.    If not we will need to make an adjustment likely in the Lantus insulin dose.  The continuous glucose monitor will need to be checked more frequently during a period of insulin dose adjustment.  The low glucose alarm will be a nice safeguard as we titrate Lantus insulin as needed.    Check blood glucoses with the sensor 3 times daily at rotating times, including a fasting glucose level each day.  We will have a virtual visit in 2 weeks to review glucose readings, as well as review the Lantus insulin needle count and make a plan to improve your blood sugar control.    Your shortness of breath with exertion could be dehydration from your high glucose levels, among other causes.  We also checked a BNP test today which will be an estimate of extra fluid from your heart  not pumping efficiently.  If this is the case we will try a diuretic (water pill) type medication to reduce the extra fluid in your body to see if this helps with your shortness of breath.    Your blood pressure is under good control, but we will have to be cautious to make sure not drop your blood pressure too low if we have to add a diuretic medication.    Schedule a virtual visit with Dr. Gutierrez for about 2 weeks from now.  Even if you and your daughter are not in the same building during the virtual visit, we can set up a three-way call to review blood glucoses and set up a plan.      1. Type 2 diabetes mellitus with hyperglycemia, with long-term current use of insulin (H)  A1c 10.4%, well above goal of 8.5% or less    Medication nonadherence due to memory deficit may be playing a role  Continuous glucose monitoring is essential to safely titrate insulin, as he has had difficulty with hypoglycemia in the past during insulin dose titrations    We will work with his family to do pen needle counts, more closely monitor insulin usage and adherence to the prescribed doses  Continue use of the continuous glucose monitor, but use the recorder more frequently to get more complete records    Follow-up in 2 weeks as outlined above.  See patient instructions outlined above.  - Basic metabolic panel; Future  - Hemoglobin A1c; Future  - Basic metabolic panel  - Hemoglobin A1c    2. Type 2 diabetes mellitus with microalbuminuria, with long-term current use of insulin (H)  As above    3. Primary hypertension  Well-controlled  Continue current medical management.    4. Chronic kidney disease, stage 3a (H)  Repeat metabolic profile today  - Magnesium; Future  - Magnesium    5. Cerebral vascular disease  Continue secondary prevention with statin, aspirin, improve glycemic control  Blood pressure well controlled    6. S/P carotid endarterectomy  As above    7. Age-related cognitive decline  He and his family are capable of using  the continuous glucose monitor  Continue close family monitoring    8. Type 2 diabetes mellitus with stage 2 chronic kidney disease, with long-term current use of insulin (H)  As above    9. ROY (dyspnea on exertion)  Differential diagnosis includes relative hypovolemia due to hyperglycemia, congestive heart failure, lung disease, anemia, cardiac ischemia, deconditioning.  Discussed the broad differential with the patient and the patient's family, and they elected a conservative approach with an outpatient evaluation as outlined above.  Family will continue to monitor for any worsening of symptoms, and seek emergency care if any worsening or her reduced function  See patient structures as outlined above.    - N terminal pro BNP outpatient; Future  - N terminal pro BNP outpatient  - CBC with platelets; Future  - CBC with platelets      Follow up: 2 weeks as outlined in the patient instructions  Options for treatment and follow-up care were reviewed with the patient and/or guardian. Elie Martinez and/or guardian engaged in the decision making process and verbalized understanding of the options discussed and agreed with the final plan.    Jhon Gutierrez MD  Faculty - [facility]

## 2023-06-26 NOTE — PATIENT INSTRUCTIONS
Your blood sugars have been running quite high over the past few weeks.    Our plan is to set aside 14 pen needles for every 2-weeks to more closely observe if any doses of Lantus are being missed.  If doses of Lantus are being missed this could account for the high blood sugar levels.    If not we will need to make an adjustment likely in the Lantus insulin dose.  The continuous glucose monitor will need to be checked more frequently during a period of insulin dose adjustment.  The low glucose alarm will be a nice safeguard as we titrate Lantus insulin as needed.    Check blood glucoses with the sensor 3 times daily at rotating times, including a fasting glucose level each day.  We will have a virtual visit in 2 weeks to review glucose readings, as well as review the Lantus insulin needle count and make a plan to improve your blood sugar control.    Your shortness of breath with exertion could be dehydration from your high glucose levels, among other causes.  We also checked a BNP test today which will be an estimate of extra fluid from your heart not pumping efficiently.  If this is the case we will try a diuretic (water pill) type medication to reduce the extra fluid in your body to see if this helps with your shortness of breath.    Your blood pressure is under good control, but we will have to be cautious to make sure not drop your blood pressure too low if we have to add a diuretic medication.    Schedule a virtual visit with Dr. Gutierrez for about 2 weeks from now.  Even if you and your daughter are not in the same building during the virtual visit, we can set up a three-way call to review blood glucoses and set up a plan.

## 2023-06-27 NOTE — RESULT ENCOUNTER NOTE
"Don's blood tests show his glucoses have been quite high and he is likely a bit dehydrated from the extra glucose in his urine.   His heart \"fluid test\" was normal, and his blood count was normal.    Increase the lantus insulin to 45 units daily and ensure daily use of Lantus.    Check blood glucoses with the sensor 3 times daily at rotating times, including a fasting glucose level each day.  Return to clinic or a virtual visit in 2 weeks to review glucose readings and make further adjustments."

## 2023-07-26 DIAGNOSIS — Z79.4 TYPE 2 DIABETES MELLITUS WITH HYPERGLYCEMIA, WITH LONG-TERM CURRENT USE OF INSULIN (H): ICD-10-CM

## 2023-07-26 DIAGNOSIS — E11.65 TYPE 2 DIABETES MELLITUS WITH HYPERGLYCEMIA, WITH LONG-TERM CURRENT USE OF INSULIN (H): ICD-10-CM

## 2023-07-26 NOTE — TELEPHONE ENCOUNTER
PRESCRIPTIONS MUST BE WRITTEN THIS WAY TO MAKE THEM MEDICARE COMPLIANT    FREESTYLE RADHA 2 SENSORS  SIG: Change every 14 days.  QTY: 2  REFILLS: 5    -Prescriptions must be written after the clinical note date and will only be able to be used for 6 months from the date of the clinical notes. All prescriptions must be from same provider who patient had visit with. (We will be requesting new clinical notes and prescriptions every 6 months to meet Medicare Guidelines.)    Please contact us at [phone] (this number is for clinics only) with any questions. Please only give [phone] to patients.    [facility]   [address] [place] [address]  Phone # [phone]  Fax # [phone]

## 2023-10-02 DIAGNOSIS — Z79.4 TYPE 2 DIABETES MELLITUS WITH HYPERGLYCEMIA, WITH LONG-TERM CURRENT USE OF INSULIN (H): ICD-10-CM

## 2023-10-02 DIAGNOSIS — E11.65 TYPE 2 DIABETES MELLITUS WITH HYPERGLYCEMIA, WITH LONG-TERM CURRENT USE OF INSULIN (H): ICD-10-CM

## 2023-10-02 DIAGNOSIS — I67.9 CEREBRAL VASCULAR DISEASE: ICD-10-CM

## 2023-10-02 RX ORDER — ATORVASTATIN CALCIUM 40 MG/1
40 TABLET, FILM COATED ORAL DAILY
Qty: 90 TABLET | Refills: 3 | Status: SHIPPED | OUTPATIENT
Start: 2023-10-02

## 2023-10-12 ENCOUNTER — APPOINTMENT (OUTPATIENT)
Dept: RADIOLOGY | Facility: HOSPITAL | Age: 88
End: 2023-10-12
Attending: EMERGENCY MEDICINE
Payer: MEDICARE

## 2023-10-12 ENCOUNTER — APPOINTMENT (OUTPATIENT)
Dept: CT IMAGING | Facility: HOSPITAL | Age: 88
End: 2023-10-12
Attending: EMERGENCY MEDICINE
Payer: MEDICARE

## 2023-10-12 ENCOUNTER — HOSPITAL ENCOUNTER (EMERGENCY)
Facility: HOSPITAL | Age: 88
Discharge: HOME | End: 2023-10-12
Attending: EMERGENCY MEDICINE | Admitting: EMERGENCY MEDICINE
Payer: MEDICARE

## 2023-10-12 VITALS
RESPIRATION RATE: 27 BRPM | DIASTOLIC BLOOD PRESSURE: 55 MMHG | HEART RATE: 77 BPM | SYSTOLIC BLOOD PRESSURE: 110 MMHG | TEMPERATURE: 96.3 F | WEIGHT: 180 LBS | OXYGEN SATURATION: 98 % | BODY MASS INDEX: 23.1 KG/M2 | HEIGHT: 74 IN
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
## 2023-10-12 DIAGNOSIS — R42 LIGHTHEADEDNESS: ICD-10-CM

## 2023-10-12 LAB
ABO/RH(D): NORMAL
ALBUMIN SERPL BCG-MCNC: 4.1 G/DL (ref 3.5–5.2)
ALP SERPL-CCNC: 91 U/L (ref 40–129)
ALT SERPL W P-5'-P-CCNC: 20 U/L (ref 0–70)
ANION GAP SERPL CALCULATED.3IONS-SCNC: 13 MMOL/L (ref 7–15)
ANTIBODY SCREEN: NEGATIVE
AST SERPL W P-5'-P-CCNC: 28 U/L (ref 0–45)
BASO+EOS+MONOS # BLD AUTO: NORMAL 10*3/UL
BASO+EOS+MONOS NFR BLD AUTO: NORMAL %
BASOPHILS # BLD AUTO: 0 10E3/UL (ref 0–0.2)
BASOPHILS NFR BLD AUTO: 1 %
BILIRUB SERPL-MCNC: 0.6 MG/DL
BUN SERPL-MCNC: 27.7 MG/DL (ref 8–23)
CALCIUM SERPL-MCNC: 9.7 MG/DL (ref 8.2–9.6)
CHLORIDE SERPL-SCNC: 107 MMOL/L (ref 98–107)
CREAT SERPL-MCNC: 1.18 MG/DL (ref 0.67–1.17)
DEPRECATED HCO3 PLAS-SCNC: 21 MMOL/L (ref 22–29)
EGFRCR SERPLBLD CKD-EPI 2021: 58 ML/MIN/1.73M2
EOSINOPHIL # BLD AUTO: 0.1 10E3/UL (ref 0–0.7)
EOSINOPHIL NFR BLD AUTO: 1 %
ERYTHROCYTE [DISTWIDTH] IN BLOOD BY AUTOMATED COUNT: 13.5 % (ref 10–15)
GLUCOSE SERPL-MCNC: 227 MG/DL (ref 70–99)
HCT VFR BLD AUTO: 44 % (ref 40–53)
HGB BLD-MCNC: 14.2 G/DL (ref 13.3–17.7)
IMM GRANULOCYTES # BLD: 0 10E3/UL
IMM GRANULOCYTES NFR BLD: 0 %
LYMPHOCYTES # BLD AUTO: 1.3 10E3/UL (ref 0.8–5.3)
LYMPHOCYTES NFR BLD AUTO: 15 %
MAGNESIUM SERPL-MCNC: 1.9 MG/DL (ref 1.7–2.3)
MCH RBC QN AUTO: 29.2 PG (ref 26.5–33)
MCHC RBC AUTO-ENTMCNC: 32.3 G/DL (ref 31.5–36.5)
MCV RBC AUTO: 90 FL (ref 78–100)
MONOCYTES # BLD AUTO: 0.5 10E3/UL (ref 0–1.3)
MONOCYTES NFR BLD AUTO: 7 %
NEUTROPHILS # BLD AUTO: 6.3 10E3/UL (ref 1.6–8.3)
NEUTROPHILS NFR BLD AUTO: 76 %
NRBC # BLD AUTO: 0 10E3/UL
NRBC BLD AUTO-RTO: 0 /100
PLATELET # BLD AUTO: 238 10E3/UL (ref 150–450)
POTASSIUM SERPL-SCNC: 4.6 MMOL/L (ref 3.4–5.3)
PROT SERPL-MCNC: 6.8 G/DL (ref 6.4–8.3)
RBC # BLD AUTO: 4.87 10E6/UL (ref 4.4–5.9)
SODIUM SERPL-SCNC: 141 MMOL/L (ref 135–145)
SPECIMEN EXPIRATION DATE: NORMAL
TROPONIN T SERPL HS-MCNC: 46 NG/L
TROPONIN T SERPL HS-MCNC: 48 NG/L
WBC # BLD AUTO: 8.2 10E3/UL (ref 4–11)

## 2023-10-12 PROCEDURE — 84484 ASSAY OF TROPONIN QUANT: CPT | Performed by: EMERGENCY MEDICINE

## 2023-10-12 PROCEDURE — 80053 COMPREHEN METABOLIC PANEL: CPT | Performed by: EMERGENCY MEDICINE

## 2023-10-12 PROCEDURE — 70450 CT HEAD/BRAIN W/O DYE: CPT | Mod: MG

## 2023-10-12 PROCEDURE — 93005 ELECTROCARDIOGRAM TRACING: CPT | Performed by: EMERGENCY MEDICINE

## 2023-10-12 PROCEDURE — 71046 X-RAY EXAM CHEST 2 VIEWS: CPT

## 2023-10-12 PROCEDURE — 85004 AUTOMATED DIFF WBC COUNT: CPT | Performed by: EMERGENCY MEDICINE

## 2023-10-12 PROCEDURE — 258N000003 HC RX IP 258 OP 636: Performed by: EMERGENCY MEDICINE

## 2023-10-12 PROCEDURE — 73562 X-RAY EXAM OF KNEE 3: CPT | Mod: RT

## 2023-10-12 PROCEDURE — 83735 ASSAY OF MAGNESIUM: CPT | Performed by: EMERGENCY MEDICINE

## 2023-10-12 PROCEDURE — 99285 EMERGENCY DEPT VISIT HI MDM: CPT | Mod: 25

## 2023-10-12 PROCEDURE — 86901 BLOOD TYPING SEROLOGIC RH(D): CPT | Performed by: EMERGENCY MEDICINE

## 2023-10-12 PROCEDURE — 36415 COLL VENOUS BLD VENIPUNCTURE: CPT | Performed by: EMERGENCY MEDICINE

## 2023-10-12 PROCEDURE — 96360 HYDRATION IV INFUSION INIT: CPT

## 2023-10-12 PROCEDURE — 96361 HYDRATE IV INFUSION ADD-ON: CPT

## 2023-10-12 RX ADMIN — SODIUM CHLORIDE 1000 ML: 9 INJECTION, SOLUTION INTRAVENOUS at 15:41

## 2023-10-12 ASSESSMENT — ACTIVITIES OF DAILY LIVING (ADL)
ADLS_ACUITY_SCORE: 35
ADLS_ACUITY_SCORE: 35

## 2023-10-12 NOTE — DISCHARGE INSTRUCTIONS
You were seen in the emergency department today for a fall.  Your evaluation included extensive lab testing as well as trauma imaging of your head and right knee.  We are reassured by your evaluation overall.  You received some IV fluids here and we are glad that you are feeling improved and stable.  As we discussed, we would like you to be monitored as much as possible particularly over the next couple of days.  If you have any recurrent severe lightheadedness or feel unsafe we should reevaluate you right away in the emergency department.  We would like you to call and update your primary doctor first thing tomorrow.  Please focus on trying to keep your liquid intake increased at home to avoid further lightheadedness.  If you have any other immediate concerns, as we discussed, we would need to reevaluate right away in the emergency department.

## 2023-10-12 NOTE — ED NOTES
Bed: [facility]  Expected date:   Expected time:   Means of arrival:   Comments:  [place] - 89 y Fall

## 2023-10-12 NOTE — ED PROVIDER NOTES
EMERGENCY DEPARTMENT ENCOUNTER      NAME: Elie Martinez  AGE: 89 year old male  YOB: 1932  MRN: [id number]  EVALUATION DATE & TIME: 10/12/2023  2:34 PM    PCP: Jhon Gutierrez    ED PROVIDER: Robert Williamson M.D.      Chief Complaint   Patient presents with    Fall         FINAL IMPRESSION:  1. Fall, initial encounter    2. Lightheadedness          ED COURSE & MEDICAL DECISION MAKIN:37 PM I met with the patient, obtained history, performed an initial exam, and discussed options and plan for diagnostics and treatment here in the ED.    89 year old male presents to the Emergency Department for evaluation of lightheadedness and fall.  Patient is 89, history of diabetes, lives at home and has some cognitive impairment per his son who does most of the work at home for him including checking on him daily and bring him groceries.  He fell this morning, reportedly was feeling lightheaded leading up to this, and then had some persistent lightheadedness which led them to present to the ED later in the afternoon.  Here he is vitally stable at rest.  He has an unremarkable cardiopulmonary exam.  Negative exam for trauma except for some anterior knee abrasions on the right side.  Did pursue an extensive work-up for lightheadedness/near syncope including EKG, cardiac monitoring, troponin and delta troponin.  His labs are notable for stable baseline mild CKD and mild hyperglycemia.  Hemoglobin and the rest of his metabolic profile are generally stable.  He does have a mild elevation of his high-sensitivity troponin without any associated complaints of chest pain.  This was unchanged on repeat here.  I am less suspicious that this is playing an active role in his presentation today given the complete absence of reported chest pain or dyspnea, with his baseline CKD and age, this is probably baseline.  He received some IV fluids here.  Following this he was able to get up out of bed, ambulate to the bathroom  with a cane which is his baseline.  His son and the patient both felt that he was doing much better than he had been earlier at home.  He was still offered admission to the hospital for consideration of additional work-up for lightheadedness or therapy evaluations to ensure safety however at this point they are feeling that he is much improved, no longer symptomatic, and feel that they can arrange for appropriate monitoring at home.  Specifically the patient's son and his daughter are going to work hard to include that he is monitored essentially at all times during the day for the next couple days at least.  They will contact his primary doctor for follow-up after this ED visit and return sooner if they have any other immediate concerns.    At the conclusion of the encounter I discussed the results of all of the tests and the disposition. The questions were answered. The patient or family acknowledged understanding and was agreeable with the care plan.       Medical Decision Making    History:  Supplemental history from: Documented in chart, if applicable  External Record(s) reviewed: Documented in chart, if applicable.    Work Up:  Chart documentation includes differential considered and any EKGs or imaging independently interpreted by provider, where specified.  In additional to work up documented, I considered the following work up: Documented in chart, if applicable.    External consultation:  Discussion of management with another provider: Documented in chart, if applicable    Complicating factors:  Care impacted by chronic illness: Cancer/Chemotherapy, Chronic Kidney Disease, Hyperlipidemia, and Hypertension  Care affected by social determinants of health: N/A    Disposition considerations: Discharge. No recommendations on prescription strength medication(s). I considered admission, but discharged the patient after share decision making conversation.    MEDICATIONS GIVEN IN THE EMERGENCY:  Medications    sodium chloride 0.9% BOLUS 1,000 mL (0 mLs Intravenous Stopped 10/12/23 6830)       NEW PRESCRIPTIONS STARTED AT TODAY'S ER VISIT  New Prescriptions    No medications on file          =================================================================    HPI    Patient information was obtained from: EMS, Patient, Patient's son    Use of : N/A       Elie Martinez is a 89 year old male with a pertinent history of  type II diabetes, hyperlipidemia, and hypertension who presents to this ED by EMS for evaluation of a fall.    Per EMS, patient had fallen earlier this morning (10/12).Patient did not have any head trauma and is not on any thinners.    Patient reports that he had a sudden onset of lightheadedness and so he went to lay down on the cough but missed and fell on the ground.  He sustained a right knee abrasion but otherwise no other injuries to the back or head.    Per patient's son, the patient lives in a senior apartment and he had went to visit him and he had concerns after the fall.He had been able to walk after the fall.    Patient denied any fever, abdominal pain and back pain. No other complaints at this time.    REVIEW OF SYSTEMS   All systems reviewed and negative except as noted in HPI.    PAST MEDICAL HISTORY:  Past Medical History:   Diagnosis Date    Cancer (H)     prostate    Cerebral vascular disease     Chronic kidney disease     Diabetes (H)     Hyperlipidemia     Hypertension        PAST SURGICAL HISTORY:  Past Surgical History:   Procedure Laterality Date    NO HISTORY OF SURGERY      US GUIDED NEEDLE PLACEMENT  10/21/2019           CURRENT MEDICATIONS:    No current facility-administered medications for this encounter.     Current Outpatient Medications   Medication    aspirin 325 MG tablet    atorvastatin (LIPITOR) 40 MG tablet    blood glucose (NO BRAND SPECIFIED) lancets standard    blood glucose (NO BRAND SPECIFIED) test strip    blood glucose monitoring (FREESTYLE FREEDOM LITE)  "meter device kit    blood glucose monitoring (NO BRAND SPECIFIED) meter device kit    Continuous Blood Gluc Sensor (FREESTYLE RADHA 2 SENSOR) MISC    empagliflozin (JARDIANCE) 25 MG TABS tablet    insulin glargine (LANTUS SOLOSTAR) 100 UNIT/ML pen    insulin pen needle (BD PEN NEEDLE MICRO U/F) 32G X 6 MM miscellaneous    lisinopril (ZESTRIL) 10 MG tablet    Multiple Vitamins-Minerals (ICAPS AREDS FORMULA) TABS         ALLERGIES:  No Known Allergies    FAMILY HISTORY:  Family History   Problem Relation Age of Onset    Diabetes Daughter     Hypertension No family hx of     Coronary Artery Disease No family hx of     Breast Cancer No family hx of     Colon Cancer No family hx of     Prostate Cancer No family hx of     Other Cancer No family hx of     Asthma No family hx of        SOCIAL HISTORY:   Social History     Socioeconomic History    Marital status:    Tobacco Use    Smoking status: Former     Types: Cigarettes     Quit date: 1994     Years since quittin.4    Smokeless tobacco: Former     Quit date: 1952   Substance and Sexual Activity    Alcohol use: Yes     Alcohol/week: 0.0 standard drinks of alcohol     Comment: 2 beers/week    Drug use: No    Sexual activity: Never       VITALS:  /55   Pulse 77   Temp (!) 96.3  F (35.7  C) (Axillary)   Resp 27   Ht 1.88 m (6' 2\")   Wt 81.6 kg (180 lb)   SpO2 98%   BMI 23.11 kg/m      PHYSICAL EXAM    Constitutional: Elderly male patient, sitting up in bed, no acute distress  HENT: Normocephalic, Atraumatic. Neck Supple.  Eyes: EOMI, Conjunctiva normal.  Respiratory: Breathing comfortably on room air. Speaks full sentences easily. Lungs clear to ascultation.  Cardiovascular: Normal heart rate, Regular rhythm. No peripheral edema.  Abdomen: Soft, nontender  Musculoskeletal: No major deformities noted.  Back is atraumatic.  There is a superficial appearing abrasion to the anterior side of the right knee without any underlying palpable bony " deformity or bony tenderness.  Normal range of motion of the affected right knee and all other major joints.  Integument: Warm, Dry.  Neurologic: Alert & awake, Normal motor function, Normal sensory function, No focal deficits noted.   Psychiatric: Cooperative. Affect appropriate.     LAB:  All pertinent labs reviewed and interpreted.  Labs Ordered and Resulted from Time of ED Arrival to Time of ED Departure   COMPREHENSIVE METABOLIC PANEL - Abnormal       Result Value    Sodium 141      Potassium 4.6      Carbon Dioxide (CO2) 21 (*)     Anion Gap 13      Urea Nitrogen 27.7 (*)     Creatinine 1.18 (*)     GFR Estimate 58 (*)     Calcium 9.7 (*)     Chloride 107      Glucose 227 (*)     Alkaline Phosphatase 91      AST 28      ALT 20      Protein Total 6.8      Albumin 4.1      Bilirubin Total 0.6     TROPONIN T, HIGH SENSITIVITY - Abnormal    Troponin T, High Sensitivity 48 (*)    TROPONIN T, HIGH SENSITIVITY - Abnormal    Troponin T, High Sensitivity 46 (*)    MAGNESIUM - Normal    Magnesium 1.9     CBC WITH PLATELETS AND DIFFERENTIAL    WBC Count 8.2      RBC Count 4.87      Hemoglobin 14.2      Hematocrit 44.0      MCV 90      MCH 29.2      MCHC 32.3      RDW 13.5      Platelet Count 238      % Neutrophils 76      % Lymphocytes 15      % Monocytes 7      Mids % (Monos, Eos, Basos)        % Eosinophils 1      % Basophils 1      % Immature Granulocytes 0      NRBCs per 100 WBC 0      Absolute Neutrophils 6.3      Absolute Lymphocytes 1.3      Absolute Monocytes 0.5      Mids Abs (Monos, Eos, Basos)        Absolute Eosinophils 0.1      Absolute Basophils 0.0      Absolute Immature Granulocytes 0.0      Absolute NRBCs 0.0     ROUTINE UA WITH MICROSCOPIC REFLEX TO CULTURE   TYPE AND SCREEN, ADULT    ABO/RH(D) O POS      Antibody Screen Negative      SPECIMEN EXPIRATION DATE [id number]     ABO/RH TYPE AND SCREEN       RADIOLOGY:  Reviewed all pertinent imaging. Please see official radiology report.  Chest XR,  PA &  LAT   Final Result   IMPRESSION:      Lungs are clear. No airspace opacities, pleural effusions, or pneumothorax. Nonenlarged cardiac silhouette.      Diffusely demineralized bones. Multilevel spinal degenerative changes.      XR Knee Right 3 Views   Final Result   IMPRESSION:    1.  No fracture or joint malalignment.   2.  Mild right knee degenerative arthrosis, stable. Mild medial compartment narrowing. Chondrocalcinosis in the medial and lateral compartments. No significant joint effusion.   3.  Mild bone demineralization.   4.  Atherosclerotic calcification.       Head CT w/o contrast   Final Result   IMPRESSION:   1.  No acute intracranial hemorrhage.   2.  CSF density prominence over left parietal convexity and right parafalcine favored to represent expanded extra-axial spaces related to cerebral volume loss/encephalomalacia. Hygromas can have a similar appearance.   3.  Mild-moderate diffuse cerebral volume loss and features compatible with chronic microangiopathic ischemic white matter changes.                EKG:    Performed at: 1444    Impression: Sinus rhythm with first-degree AV block, otherwise normal ECG    Rate: 68  Rhythm: Sinus  Axis: Normal  OK Interval: 336  QRS Interval: 70  QTc Interval: 438  ST Changes: None significant  Comparison: Compared to October 22, 2019, no significant change found    I have independently reviewed and interpreted the EKG(s) documented above.          I, Miguel Woodruff , am serving as a scribe to document services personally performed by Dr. Robert Williamson, based on my observation and the provider's statements to me. I, Robert Williamson MD attest that Miguel Woodruff  is acting in a scribe capacity, has observed my performance of the services and has documented them in accordance with my direction.    Robert Williamson M.D.  Emergency Medicine  [facility] EMERGENCY DEPARTMENT  [address] [place] [id number] [phone]  Dept:  [phone]       Robert Williamson MD  10/12/23 0486

## 2023-10-12 NOTE — ED TRIAGE NOTES
BIBA fell earlier today, scraped his R knee     Triage Assessment (Adult)       Row Name 10/12/23 4043          Triage Assessment    Airway WDL WDL        Respiratory WDL    Respiratory WDL WDL        Skin Circulation/Temperature WDL    Skin Circulation/Temperature WDL WDL        Cardiac WDL    Cardiac WDL WDL        Peripheral/Neurovascular WDL    Peripheral Neurovascular WDL WDL        Cognitive/Neuro/Behavioral WDL    Cognitive/Neuro/Behavioral WDL WDL

## 2023-10-12 NOTE — ED NOTES
"Patient ambulated fairly well with his own cane from the bed hallway to the bathroom.  Patient son said that he is walking with his normal, patient didn't show any signs of pain.  Patient stated \"I feels really good.\"  Primary RN is been notified.  "

## 2023-10-13 ENCOUNTER — PATIENT OUTREACH (OUTPATIENT)
Dept: CARE COORDINATION | Facility: CLINIC | Age: 88
End: 2023-10-13
Payer: MEDICARE

## 2023-10-13 LAB
ATRIAL RATE - MUSE: 68 BPM
DIASTOLIC BLOOD PRESSURE - MUSE: NORMAL MMHG
INTERPRETATION ECG - MUSE: NORMAL
P AXIS - MUSE: 73 DEGREES
PR INTERVAL - MUSE: 336 MS
QRS DURATION - MUSE: 70 MS
QT - MUSE: 412 MS
QTC - MUSE: 438 MS
R AXIS - MUSE: 73 DEGREES
SYSTOLIC BLOOD PRESSURE - MUSE: NORMAL MMHG
T AXIS - MUSE: 75 DEGREES
VENTRICULAR RATE- MUSE: 68 BPM

## 2023-10-13 NOTE — PROGRESS NOTES
Clinic Care Coordination Contact  Follow Up Progress Note      Assessment:  The pt was recently in the ED, I called to check up on the pt, and help the pt setup a ED follow up. The pt was at  [facility] for a fall. I called and talked to the pt, pt stated that he is doing shantel now. He did not feel that he needs a follow up.    Care Gaps:    Health Maintenance Due   Topic Date Due    ADVANCE CARE PLANNING  Never done    COLORECTAL CANCER SCREENING  Never done    HEPATITIS B IMMUNIZATION (1 of 3 - Risk 3-dose series) Never done    RSV VACCINE 60+ (1 - 1-dose 60+ series) Never done    MEDICARE ANNUAL WELLNESS VISIT  04/09/2019    FALL RISK ASSESSMENT  11/05/2019    DIABETIC FOOT EXAM  12/16/2020    MICROALBUMIN  09/13/2022    EYE EXAM  06/20/2023    INFLUENZA VACCINE (1) 09/01/2023    COVID-19 Vaccine (5 - 2023-24 season) 09/01/2023           Care Plans      Intervention/Education provided during outreach:               Plan:     Care Coordinator will follow up in

## 2024-02-05 ENCOUNTER — OFFICE VISIT (OUTPATIENT)
Dept: FAMILY MEDICINE | Facility: CLINIC | Age: 89
End: 2024-02-05
Payer: MEDICARE

## 2024-02-05 VITALS
HEART RATE: 69 BPM | OXYGEN SATURATION: 96 % | WEIGHT: 193 LBS | BODY MASS INDEX: 26.14 KG/M2 | HEIGHT: 72 IN | SYSTOLIC BLOOD PRESSURE: 131 MMHG | DIASTOLIC BLOOD PRESSURE: 72 MMHG

## 2024-02-05 DIAGNOSIS — E11.29 TYPE 2 DIABETES MELLITUS WITH MICROALBUMINURIA, WITH LONG-TERM CURRENT USE OF INSULIN (H): ICD-10-CM

## 2024-02-05 DIAGNOSIS — R80.9 TYPE 2 DIABETES MELLITUS WITH MICROALBUMINURIA, WITH LONG-TERM CURRENT USE OF INSULIN (H): ICD-10-CM

## 2024-02-05 DIAGNOSIS — Z79.4 TYPE 2 DIABETES MELLITUS WITH HYPERGLYCEMIA, WITH LONG-TERM CURRENT USE OF INSULIN (H): Primary | ICD-10-CM

## 2024-02-05 DIAGNOSIS — E78.5 HYPERLIPIDEMIA LDL GOAL <100: ICD-10-CM

## 2024-02-05 DIAGNOSIS — I67.9 CEREBRAL VASCULAR DISEASE: ICD-10-CM

## 2024-02-05 DIAGNOSIS — Z79.4 TYPE 2 DIABETES MELLITUS WITH MICROALBUMINURIA, WITH LONG-TERM CURRENT USE OF INSULIN (H): ICD-10-CM

## 2024-02-05 DIAGNOSIS — E11.65 TYPE 2 DIABETES MELLITUS WITH HYPERGLYCEMIA, WITH LONG-TERM CURRENT USE OF INSULIN (H): Primary | ICD-10-CM

## 2024-02-05 DIAGNOSIS — N18.31 CHRONIC KIDNEY DISEASE, STAGE 3A (H): ICD-10-CM

## 2024-02-05 DIAGNOSIS — Z00.00 WELLNESS EXAMINATION: Primary | ICD-10-CM

## 2024-02-05 DIAGNOSIS — Z98.890 S/P CAROTID ENDARTERECTOMY: ICD-10-CM

## 2024-02-05 DIAGNOSIS — I10 PRIMARY HYPERTENSION: ICD-10-CM

## 2024-02-05 DIAGNOSIS — C61 PROSTATE CANCER (H): ICD-10-CM

## 2024-02-05 LAB — HBA1C MFR BLD: 6.6 % (ref 0–5.6)

## 2024-02-05 PROCEDURE — 36415 COLL VENOUS BLD VENIPUNCTURE: CPT | Performed by: FAMILY MEDICINE

## 2024-02-05 PROCEDURE — G0439 PPPS, SUBSEQ VISIT: HCPCS | Performed by: FAMILY MEDICINE

## 2024-02-05 PROCEDURE — 99207 PR NO BILLABLE SERVICE THIS VISIT: CPT

## 2024-02-05 PROCEDURE — 86481 TB AG RESPONSE T-CELL SUSP: CPT | Performed by: FAMILY MEDICINE

## 2024-02-05 PROCEDURE — 80048 BASIC METABOLIC PNL TOTAL CA: CPT | Performed by: FAMILY MEDICINE

## 2024-02-05 PROCEDURE — 83036 HEMOGLOBIN GLYCOSYLATED A1C: CPT | Performed by: FAMILY MEDICINE

## 2024-02-05 SDOH — HEALTH STABILITY: PHYSICAL HEALTH: ON AVERAGE, HOW MANY MINUTES DO YOU ENGAGE IN EXERCISE AT THIS LEVEL?: 0 MIN

## 2024-02-05 SDOH — HEALTH STABILITY: PHYSICAL HEALTH: ON AVERAGE, HOW MANY DAYS PER WEEK DO YOU ENGAGE IN MODERATE TO STRENUOUS EXERCISE (LIKE A BRISK WALK)?: 0 DAYS

## 2024-02-05 ASSESSMENT — SOCIAL DETERMINANTS OF HEALTH (SDOH): HOW OFTEN DO YOU GET TOGETHER WITH FRIENDS OR RELATIVES?: MORE THAN THREE TIMES A WEEK

## 2024-02-05 NOTE — PATIENT INSTRUCTIONS
PERSONAL PREVENTIVE SERVICES PLAN - SERVICES     Review these tests with your medical staff then decide which ones you want and take this page home for your reference      SCREENING TESTS     Description   Year of Last Screening   Recommended Today?   Heart disease screening blood tests  Cholesterol level Reducing cholesterol can reduce your risk of heart attacks by 25%.  Screening is recommended yearly if you are at risk of heart disease otherwise every 4-5 years 2/6/2023  On statin    Diabetes screening tests  Hemoglobin A1c blood test   Finding and treating diabetes early can reduce complications.  Screening recommended/covered yearly if you have high blood pressure, high cholesterol, obesity (BMI >30), or a history of high blood glucose tests; or 2 of the following: family history of diabetes, overweight (BMI >25 but <30), age 65 years or older, and a history of diabetes of pregnancy or gave birth to baby weighing more than 9 lbs. 6/26/2023  hgbA1c  10.4 Yes; Recommended    Hepatitis B screening Finding hepatitis B early can reduce complications.  Screening is recommended for persons with selected risk factors.  No: is not indicated today.   Hepatitis C screening Finding hepatitis C early can reduce complications.  Screening is recommended for all persons born from 1945 through 1965 and for those with selected other risk factors.   No: is not indicated today.   HIV screening Finding HIV early can reduce complications.  Screening is recommended for persons with risk factors for HIV infection.  No: is not indicated today.   Glaucoma screening Early detection of glaucoma can prevent blindness.   Please talk to your eye doctor about this.       SCREENING TESTS     Description   Year of Last Screening   Recommended Today?   Colorectal cancer screening  Fecal occult blood test   Screening colonoscopy Screening for colon cancer has been shown to reduce death from colon cancer by 25-30%. Screening recommended to start  at 50 years and continuing until age 75 years.   2011  Per note in chart, normal No: is not indicated today.   Breast Cancer Screening (women)  Mammogram Mammogram screening for breast cancer has been shown to reduce the risk of dying from breast cancer and prolong life. Screening is recommended every 1-2 years for women aged 50 to 74 years.   No: is not indicated today.   Cervical Cancer screening (women)  Pap Cervical pap smears can reduce cervical cancer. Screening is recommended annually if high risk (history of abnormal pap smears) otherwise every 2-3 years, stop screening at 65 years of age if history of normal paps.  No: is not indicated today.   Screening for Osteoporosis:  Bone mass measurements (women)  Dexa Scan Screening and treating Osteoporosis can reduce the risk of hip and spine fractures. Screening is recommended in women 65 years or older and in women and men at risk of osteoporosis.  No: is not indicated today.   Screening for Lung Cancer   Low-dose CT scanning Screening can reduce mortality in persons aged 55-80 who have smoked at least 30 pack-years and who are either still smoking or have quit in the past 15 years.  No: is not indicated today.   Abdominal Aortic Aneurysm (AAA) screening  Ultrasound (US)   An aneurysm treated before rupture is very safe -a ruptured aneurysm can be fatal.  Screening  by US for AAA is limited to patients who meet one of the following criteria:  Men who are 65-75 years old and have smoked more than 100 cigarettes in their lifetime  Anyone with a family history of abdominal aortic aneurysm  No: is not indicated today.     Here are your recommended immunizations.  Take this home for your reference.                                                    IMMUNIZATIONS Description Recommend today?     Influenza (Flu shot) Prevents flu; should get every year No; is up to date.   PCV 13 Pneumonia vaccination; you get it once No; is up to date.   PPSV 23 Second pneumonia  vaccination; usually get it 1 year after PCV 13 No; is up to date.   Zoster (Shingles) Prevents shingles; you get it once  (Check with Part D insurance for coverage, must receive at a pharmacy, not clinic) No; is up to date.   Tetanus Prevents tetanus; once every 10 years No; is up to date.     Hepatitis B  If you have any of the following risk factors you should be immunized for hepatitis B: severe kidney disease, people who live in the same house as a carrier of Hepatitis B virus, people who live in  institutions (e.g. nursing homes or group homes), homosexual men, patients with hemophilia who received Factor VIII or IX concentrates, abusers of illicit injectable drugs No: is not indicated today.      PATIENT INSTRUCTIONS    Yearly exam:   See your health care provider every year in order to review changes in your health, review medicines that you take, and discuss preventive care needs such as immunizations and cancer screening.  Get a flu shot each year.     Advance Directives:  If you have not done so, you are encouraged to complete advance directives and/or a living will.   More information about advance directives can be found at: http://www.[place]med.org/advocacy/Key-Issues/Advance-Directives    Nutrition:   Eat at least 5 servings of fruits and vegetables each day.   Eat whole-grain bread, whole-wheat pasta and brown rice instead of white grains and rice.   Talk to your doctor about Calcium and Vitamin D.     Lifestyle:  Exercise for at least 150 minutes a week (30 minutes a day, 5 days a week). This will help you control your weight and prevent disease.   Limit alcohol to one drink per day.   If you smoke, try to quit - your doctor will be happy to help.   Wear sunscreen to prevent skin cancer.   See your dentist every six months for an exam and cleaning.   See your eye doctor every 1 to 2 years to screen for conditions such as glaucoma, macular degeneration and cataracts.

## 2024-02-05 NOTE — PROGRESS NOTES
Preventive Care Visit  [facility]  Jhon Gutierrez MD, Family Medicine  Feb 5, 2024    Assessment & Plan   Problem List Items Addressed This Visit          High    Type 2 diabetes mellitus with hyperglycemia, with long-term current use of insulin (H) - Primary    Relevant Orders    Quantiferon-TB Gold Plus (Completed)    Hemoglobin A1c (Completed)    Basic metabolic panel (Completed)       Medium    Cerebral vascular disease    Chronic kidney disease, stage 3a (H)    Hyperlipidemia LDL goal <100    Hypertension    Relevant Orders    Quantiferon-TB Gold Plus (Completed)    Hemoglobin A1c (Completed)    Basic metabolic panel (Completed)    Long-term insulin use in type 2 diabetes (H)    Relevant Orders    Quantiferon-TB Gold Plus (Completed)    Hemoglobin A1c (Completed)    Basic metabolic panel (Completed)    Prostate cancer (H)    S/P carotid endarterectomy          Review of the result(s) of each unique test - multiple prescription drugs to be managed       BMI  Estimated body mass index is 26.18 kg/m  as calculated from the following:    Height as of this encounter: 1.829 m (6').    Weight as of this encounter: 87.5 kg (193 lb).       Counseling  Appropriate preventive services were discussed with this patient, including applicable screening as appropriate for fall prevention, nutrition, physical activity, Tobacco-use cessation, weight loss and cognition.  Checklist reviewing preventive services available has been given to the patient.        No follow-ups on file.      Yaritza Pizarro is a 89 year old, presenting for the following:  Wellness Visit    [facility].  He has a number of forms and medication management orders that need to be reviewed and filled out today for [facility].    As far as his diabetes he has become dependent on his children for medication management.  He continues to use continuous glucose  monitor.  Review of the monitor shows that he has mostly in range.  Has not experienced hypoglycemic symptoms or readings.  Unfortunately his skilled nursing facility does not allow continuous glucose monitors so he will have to return to fingerstick blood glucose monitoring.  He continues on Jardiance 25 mg daily, Lantus 40 units daily.  He is no longer using meal insulin    Hypertension: Continues on lisinopril 10 mg a day.  No dizziness lightheadedness or orthostatic symptoms.  No palpitations or chest pain episodes.        2/5/2024     3:13 PM   Additional Questions   Roomed by Alyson PARNELL   Accompanied by Favian, son         Via the Health Maintenance questionnaire, the patient has reported the following services have been completed -Eye Exam, this information has been sent to the abstraction team.  Health Care Directive  Patient does not have a Health Care Directive or Living Will: Patient states has Advance Directive and will bring in a copy to clinic.    HPI  Over the past 2 years has required increasing help from his family for activities of daily living and medication management.  He and his family have elected to pursue residence at a [facility] skilled nursing facility        2/5/2024   General Health   How would you rate your overall physical health? Good   Feel stress (tense, anxious, or unable to sleep) Not at all         2/5/2024   Nutrition   Diet: Low salt    Diabetic         2/5/2024   Exercise   Days per week of moderate/strenous exercise 0 days   Average minutes spent exercising at this level 0 min   (!) EXERCISE CONCERN      2/5/2024   Social Factors   Frequency of gathering with friends or relatives More than three times a week   Worry food won't last until get money to buy more No    No   Food not last or not have enough money for food? No    No   Do you have housing?  Yes    Yes   Are you worried about losing your housing? No    No   Lack of transportation? No    No   Unable to get utilities  (heat,electricity)? No    No         2024   Fall Risk   Fallen 2 or more times in the past year? No    No   Trouble with walking or balance? (!) YES    Yes          2024   Activities of Daily Living- Home Safety   Needs help with the following daily activites Transportation    Shopping    Housework    Laundry    Medication administration    Money management   Safety concerns in the home None of the above         2024   Dental   Dentist two times every year? (!) NO         2024   Hearing Screening   Hearing concerns? None of the above         2024   Driving Risk Screening   Patient/family members have concerns about driving (!) DECLINE         2024   General Alertness/Fatigue Screening   Have you been more tired than usual lately? No         2024   Urinary Incontinence Screening   Bothered by leaking urine in past 6 months No         2024   TB Screening   Were you born outside of [place]?  No         Today's PHQ-2 Score:       2024     2:16 PM   PHQ-2 (  [employer])   Q1: Little interest or pleasure in doing things 0   Q2: Feeling down, depressed or hopeless 0   PHQ-2 Score 0    0   Q1: Little interest or pleasure in doing things Not at all   Q2: Feeling down, depressed or hopeless Not at all   PHQ-2 Score 0           2024   Substance Use   Alcohol more than 3/day or more than 7/wk No   Do you have a current opioid prescription? No   How severe/bad is pain from 1 to 10? 0/10 (No Pain)   Do you use any other substances recreationally? No     Social History     Tobacco Use    Smoking status: Former     Types: Cigarettes     Quit date: 1994     Years since quittin.7    Smokeless tobacco: Former     Quit date: 1952   Substance Use Topics    Alcohol use: Yes     Alcohol/week: 0.0 standard drinks of alcohol     Comment: 2 beers/week    Drug use: No       Current providers sharing in care for this patient include:  Patient Care Team:  Jhon Gutierrez MD as PCP - General  (Family Practice)  Jhon Gutierrez MD as Assigned PCP    The following health maintenance items are reviewed in Epic and correct as of today:  Health Maintenance   Topic Date Due    ADVANCE CARE PLANNING  Never done    COLORECTAL CANCER SCREENING  Never done    RSV VACCINE (Pregnancy & 60+) (1 - 1-dose 60+ series) Never done    MEDICARE ANNUAL WELLNESS VISIT  04/09/2019    DIABETIC FOOT EXAM  12/16/2020    MICROALBUMIN  09/13/2022    EYE EXAM  06/20/2023    A1C  12/26/2023    LIPID  02/06/2024    BMP  10/12/2024    HEMOGLOBIN  10/12/2024    FALL RISK ASSESSMENT  02/05/2025    DTAP/TDAP/TD IMMUNIZATION (2 - Td or Tdap) 08/21/2027    PHQ-2 (once per calendar year)  Completed    INFLUENZA VACCINE  Completed    Pneumococcal Vaccine: 65+ Years  Completed    URINALYSIS  Completed    ZOSTER IMMUNIZATION  Completed    COVID-19 Vaccine  Completed    IPV IMMUNIZATION  Aged Out    HPV IMMUNIZATION  Aged Out    MENINGITIS IMMUNIZATION  Aged Out    RSV MONOCLONAL ANTIBODY  Aged Out     Review of Systems    Review of Systems  Constitutional, HEENT, cardiovascular, pulmonary, gi and gu systems are negative, except as otherwise noted.     Objective    Exam  /72 (BP Location: Right arm, Patient Position: Sitting, Cuff Size: Adult Regular)   Pulse 69   Ht 1.829 m (6')   Wt 87.5 kg (193 lb)   SpO2 96%   BMI 26.18 kg/m     Estimated body mass index is 26.18 kg/m  as calculated from the following:    Height as of this encounter: 1.829 m (6').    Weight as of this encounter: 87.5 kg (193 lb).    Physical Exam  General: Alert.  Relies on son for details of his medical history.  HEENT: Head is normocephalic and atraumatic.  Sclera nonicteric noninjected.  Moist mucous membranes  Cardiovascular: Regular rate and rhythm without murmur  Respiratory: Fair air exchange bilaterally.  No rhonchi.  Abdomen: Soft and nontender  Extremities: Trace peripheral edema  Skin: No significant rash on exposed skin        2/5/2024   Mini Cog    Clock Draw Score 2 Normal   3 Item Recall 1 object recalled   Mini Cog Total Score 3            Signed Electronically by: Jhon Gutierrez MD

## 2024-02-06 ENCOUNTER — TELEPHONE (OUTPATIENT)
Dept: FAMILY MEDICINE | Facility: CLINIC | Age: 89
End: 2024-02-06
Payer: MEDICARE

## 2024-02-06 LAB
ANION GAP SERPL CALCULATED.3IONS-SCNC: 9 MMOL/L (ref 7–15)
BUN SERPL-MCNC: 24.5 MG/DL (ref 8–23)
CALCIUM SERPL-MCNC: 9.6 MG/DL (ref 8.2–9.6)
CHLORIDE SERPL-SCNC: 109 MMOL/L (ref 98–107)
CREAT SERPL-MCNC: 1.17 MG/DL (ref 0.67–1.17)
DEPRECATED HCO3 PLAS-SCNC: 23 MMOL/L (ref 22–29)
EGFRCR SERPLBLD CKD-EPI 2021: 59 ML/MIN/1.73M2
GLUCOSE SERPL-MCNC: 147 MG/DL (ref 70–99)
POTASSIUM SERPL-SCNC: 5.2 MMOL/L (ref 3.4–5.3)
SODIUM SERPL-SCNC: 141 MMOL/L (ref 135–145)

## 2024-02-06 NOTE — TELEPHONE ENCOUNTER
Hello,    We are in the process of renewing this patient's Freestyle Juan 2 Sensors through Medicare. In order to complete the renewal, the clinic notes from 2/5/24 need to be completed and signed. Please be sure to include the patient's insulin use upon completion per Medicare's guidelines below:    VISIT NOTE REQUIREMENTS: (must state the following)    Patient must be seen/clinical notes must be written in the last 6 months.  Must state that the patient is using daily insulin (Can be written that patient is injecting with insulin pump) We cannot accept medication list as insulin regimen.   Must state that the patient is a type 1 or type 2 diabetic.      We also need a new prescription for Freestyle Juan 2 Sensors. Medicare now allows a 90-day supply.    PRESCRIPTION REQUIREMENTS: must be written this way for Medicare Compliance      FREESTYLE JUAN 2 SENSORS  SIG: Change every 14 days.  QTY: 6   REFILLS: 3    *Prescriptions must be written after the clinical note date and will only be able to be used for 6 months from the date of the clinical notes. (We will be requesting new clinical notes and prescriptions every 6 months to meet Medicare Guidelines).      Please reach out with any questions or concerns.    Thank you,    Di[facility] Pharmacy Team  [facility]  Phone: [phone]  Fax: [phone]  Pool: Pharm Diabetes

## 2024-02-07 DIAGNOSIS — Z79.4 TYPE 2 DIABETES MELLITUS WITH HYPERGLYCEMIA, WITH LONG-TERM CURRENT USE OF INSULIN (H): ICD-10-CM

## 2024-02-07 DIAGNOSIS — E11.65 TYPE 2 DIABETES MELLITUS WITH HYPERGLYCEMIA, WITH LONG-TERM CURRENT USE OF INSULIN (H): ICD-10-CM

## 2024-02-07 LAB
GAMMA INTERFERON BACKGROUND BLD IA-ACNC: 0.02 IU/ML
M TB IFN-G BLD-IMP: NEGATIVE
M TB IFN-G CD4+ BCKGRND COR BLD-ACNC: 8.66 IU/ML
MITOGEN IGNF BCKGRD COR BLD-ACNC: 0 IU/ML
MITOGEN IGNF BCKGRD COR BLD-ACNC: 0 IU/ML
QUANTIFERON MITOGEN: 8.68 IU/ML
QUANTIFERON NIL TUBE: 0.02 IU/ML
QUANTIFERON TB1 TUBE: 0.02 IU/ML
QUANTIFERON TB2 TUBE: 0.02

## 2024-02-07 NOTE — TELEPHONE ENCOUNTER
PRESCRIPTIONS MUST BE WRITTEN THIS WAY TO MAKE THEM MEDICARE COMPLIANT    FREESTYLE RADHA 2 SENSORS  SIG: Change every 14 days.  QTY: 6  REFILLS: 1    -Prescriptions must be written after the clinical note date and will only be able to be used for 6 months from the date of the clinical notes. All prescriptions must be from same provider who patient had visit with. (We will be requesting new clinical notes and prescriptions every 6 months to meet Medicare Guidelines.)    Please contact us at [phone] (this number is for clinics only) with any questions. Please only give [phone] to patients.    [facility] Team   [address] [place] [address]  Phone # [phone]  Fax # [phone]

## 2024-02-08 ENCOUNTER — LAB REQUISITION (OUTPATIENT)
Dept: LAB | Facility: CLINIC | Age: 89
End: 2024-02-08
Payer: MEDICARE

## 2024-02-08 DIAGNOSIS — Z11.52 ENCOUNTER FOR SCREENING FOR COVID-19: ICD-10-CM

## 2024-02-08 PROCEDURE — 87635 SARS-COV-2 COVID-19 AMP PRB: CPT | Mod: ORL | Performed by: NURSE PRACTITIONER

## 2024-02-08 NOTE — TELEPHONE ENCOUNTER
Mr. Martinez is [facility] into a [facility] nursing [facility] today and unfortunately has to discontinue his CGM due to nursing home policy. No need to refill.

## 2024-02-09 LAB — SARS-COV-2 RNA RESP QL NAA+PROBE: NEGATIVE

## 2024-02-09 NOTE — RESULT ENCOUNTER NOTE
Don's kidney function is  stable over the past year.  His blood sugars are now well controlled.    His test for tuberculosis was normal.

## 2024-02-12 ENCOUNTER — LAB REQUISITION (OUTPATIENT)
Dept: LAB | Facility: CLINIC | Age: 89
End: 2024-02-12
Payer: MEDICARE

## 2024-02-12 DIAGNOSIS — U07.1 COVID-19: ICD-10-CM

## 2024-02-12 PROCEDURE — 87635 SARS-COV-2 COVID-19 AMP PRB: CPT | Mod: ORL | Performed by: REGISTERED NURSE

## 2024-02-13 LAB — SARS-COV-2 RNA RESP QL NAA+PROBE: NEGATIVE

## 2024-02-14 ENCOUNTER — LAB REQUISITION (OUTPATIENT)
Dept: LAB | Facility: CLINIC | Age: 89
End: 2024-02-14
Payer: MEDICARE

## 2024-02-14 DIAGNOSIS — Z13.9 ENCOUNTER FOR SCREENING, UNSPECIFIED: ICD-10-CM

## 2024-02-14 LAB
ALBUMIN SERPL BCG-MCNC: 3.7 G/DL (ref 3.5–5.2)
ALP SERPL-CCNC: 77 U/L (ref 40–150)
ALT SERPL W P-5'-P-CCNC: 19 U/L (ref 0–70)
ANION GAP SERPL CALCULATED.3IONS-SCNC: 13 MMOL/L (ref 7–15)
AST SERPL W P-5'-P-CCNC: 26 U/L (ref 0–45)
BASOPHILS # BLD AUTO: 0.1 10E3/UL (ref 0–0.2)
BASOPHILS NFR BLD AUTO: 1 %
BILIRUB SERPL-MCNC: 0.4 MG/DL
BUN SERPL-MCNC: 40.9 MG/DL (ref 8–23)
CALCIUM SERPL-MCNC: 9.3 MG/DL (ref 8.2–9.6)
CHLORIDE SERPL-SCNC: 102 MMOL/L (ref 98–107)
CHOLEST SERPL-MCNC: 99 MG/DL
CREAT SERPL-MCNC: 1.37 MG/DL (ref 0.67–1.17)
DEPRECATED HCO3 PLAS-SCNC: 21 MMOL/L (ref 22–29)
EGFRCR SERPLBLD CKD-EPI 2021: 49 ML/MIN/1.73M2
EOSINOPHIL # BLD AUTO: 0.2 10E3/UL (ref 0–0.7)
EOSINOPHIL NFR BLD AUTO: 2 %
ERYTHROCYTE [DISTWIDTH] IN BLOOD BY AUTOMATED COUNT: 14.6 % (ref 10–15)
FASTING STATUS PATIENT QL REPORTED: ABNORMAL
GLUCOSE SERPL-MCNC: 183 MG/DL (ref 70–99)
HBA1C MFR BLD: 6.8 %
HCT VFR BLD AUTO: 40.7 % (ref 40–53)
HDLC SERPL-MCNC: 31 MG/DL
HGB BLD-MCNC: 13.2 G/DL (ref 13.3–17.7)
HOLD SPECIMEN: NORMAL
IMM GRANULOCYTES # BLD: 0.1 10E3/UL
IMM GRANULOCYTES NFR BLD: 1 %
LDLC SERPL CALC-MCNC: 50 MG/DL
LYMPHOCYTES # BLD AUTO: 1.1 10E3/UL (ref 0.8–5.3)
LYMPHOCYTES NFR BLD AUTO: 15 %
MCH RBC QN AUTO: 29.5 PG (ref 26.5–33)
MCHC RBC AUTO-ENTMCNC: 32.4 G/DL (ref 31.5–36.5)
MCV RBC AUTO: 91 FL (ref 78–100)
MONOCYTES # BLD AUTO: 0.7 10E3/UL (ref 0–1.3)
MONOCYTES NFR BLD AUTO: 10 %
NEUTROPHILS # BLD AUTO: 4.8 10E3/UL (ref 1.6–8.3)
NEUTROPHILS NFR BLD AUTO: 71 %
NONHDLC SERPL-MCNC: 68 MG/DL
NRBC # BLD AUTO: 0 10E3/UL
NRBC BLD AUTO-RTO: 0 /100
PLATELET # BLD AUTO: 273 10E3/UL (ref 150–450)
POTASSIUM SERPL-SCNC: 5.5 MMOL/L (ref 3.4–5.3)
PROT SERPL-MCNC: 6.7 G/DL (ref 6.4–8.3)
RBC # BLD AUTO: 4.47 10E6/UL (ref 4.4–5.9)
SODIUM SERPL-SCNC: 136 MMOL/L (ref 135–145)
TRIGL SERPL-MCNC: 92 MG/DL
WBC # BLD AUTO: 6.9 10E3/UL (ref 4–11)

## 2024-02-14 PROCEDURE — 85025 COMPLETE CBC W/AUTO DIFF WBC: CPT | Mod: ORL | Performed by: REGISTERED NURSE

## 2024-02-14 PROCEDURE — 83036 HEMOGLOBIN GLYCOSYLATED A1C: CPT | Mod: ORL | Performed by: REGISTERED NURSE

## 2024-02-14 PROCEDURE — 80053 COMPREHEN METABOLIC PANEL: CPT | Mod: ORL | Performed by: REGISTERED NURSE

## 2024-02-14 PROCEDURE — 80061 LIPID PANEL: CPT | Mod: ORL | Performed by: REGISTERED NURSE

## 2024-02-20 ENCOUNTER — LAB REQUISITION (OUTPATIENT)
Dept: LAB | Facility: CLINIC | Age: 89
End: 2024-02-20
Payer: MEDICARE

## 2024-02-20 DIAGNOSIS — E11.9 TYPE 2 DIABETES MELLITUS WITHOUT COMPLICATIONS (H): ICD-10-CM

## 2024-02-21 LAB — POTASSIUM SERPL-SCNC: 5.1 MMOL/L (ref 3.4–5.3)

## 2024-02-21 PROCEDURE — 84132 ASSAY OF SERUM POTASSIUM: CPT | Mod: ORL | Performed by: REGISTERED NURSE

## 2024-02-21 PROCEDURE — P9604 ONE-WAY ALLOW PRORATED TRIP: HCPCS | Mod: ORL | Performed by: REGISTERED NURSE

## 2024-02-21 PROCEDURE — 36415 COLL VENOUS BLD VENIPUNCTURE: CPT | Mod: ORL | Performed by: REGISTERED NURSE

## 2024-06-25 ENCOUNTER — LAB REQUISITION (OUTPATIENT)
Dept: LAB | Facility: CLINIC | Age: 89
End: 2024-06-25
Payer: MEDICARE

## 2024-06-25 DIAGNOSIS — N18.31 CHRONIC KIDNEY DISEASE, STAGE 3A (H): ICD-10-CM

## 2024-06-25 DIAGNOSIS — I12.9 HYPERTENSIVE CHRONIC KIDNEY DISEASE WITH STAGE 1 THROUGH STAGE 4 CHRONIC KIDNEY DISEASE, OR UNSPECIFIED CHRONIC KIDNEY DISEASE: ICD-10-CM

## 2024-06-26 LAB
ANION GAP SERPL CALCULATED.3IONS-SCNC: 11 MMOL/L (ref 7–15)
BUN SERPL-MCNC: 37 MG/DL (ref 8–23)
CALCIUM SERPL-MCNC: 8.8 MG/DL (ref 8.2–9.6)
CHLORIDE SERPL-SCNC: 109 MMOL/L (ref 98–107)
CREAT SERPL-MCNC: 1.36 MG/DL (ref 0.67–1.17)
DEPRECATED HCO3 PLAS-SCNC: 21 MMOL/L (ref 22–29)
EGFRCR SERPLBLD CKD-EPI 2021: 49 ML/MIN/1.73M2
GLUCOSE SERPL-MCNC: 101 MG/DL (ref 70–99)
POTASSIUM SERPL-SCNC: 4.7 MMOL/L (ref 3.4–5.3)
SODIUM SERPL-SCNC: 141 MMOL/L (ref 135–145)

## 2024-06-26 PROCEDURE — 36415 COLL VENOUS BLD VENIPUNCTURE: CPT | Mod: ORL | Performed by: REGISTERED NURSE

## 2024-06-26 PROCEDURE — 80048 BASIC METABOLIC PNL TOTAL CA: CPT | Mod: ORL | Performed by: REGISTERED NURSE

## 2024-06-26 PROCEDURE — P9604 ONE-WAY ALLOW PRORATED TRIP: HCPCS | Mod: ORL | Performed by: REGISTERED NURSE

## 2024-06-29 ENCOUNTER — LAB REQUISITION (OUTPATIENT)
Dept: LAB | Facility: CLINIC | Age: 89
End: 2024-06-29
Payer: MEDICARE

## 2024-06-29 DIAGNOSIS — R53.1 WEAKNESS: ICD-10-CM

## 2024-06-29 LAB
BASOPHILS # BLD AUTO: 0 10E3/UL (ref 0–0.2)
BASOPHILS NFR BLD AUTO: 0 %
EOSINOPHIL # BLD AUTO: 0.1 10E3/UL (ref 0–0.7)
EOSINOPHIL NFR BLD AUTO: 0 %
ERYTHROCYTE [DISTWIDTH] IN BLOOD BY AUTOMATED COUNT: 15.5 % (ref 10–15)
HCT VFR BLD AUTO: 23.5 % (ref 40–53)
HGB BLD-MCNC: 7.2 G/DL (ref 13.3–17.7)
IMM GRANULOCYTES # BLD: 0.1 10E3/UL
IMM GRANULOCYTES NFR BLD: 0 %
LYMPHOCYTES # BLD AUTO: 0.8 10E3/UL (ref 0.8–5.3)
LYMPHOCYTES NFR BLD AUTO: 8 %
MCH RBC QN AUTO: 25 PG (ref 26.5–33)
MCHC RBC AUTO-ENTMCNC: 30.6 G/DL (ref 31.5–36.5)
MCV RBC AUTO: 82 FL (ref 78–100)
MONOCYTES # BLD AUTO: 0.9 10E3/UL (ref 0–1.3)
MONOCYTES NFR BLD AUTO: 8 %
NEUTROPHILS # BLD AUTO: 9.3 10E3/UL (ref 1.6–8.3)
NEUTROPHILS NFR BLD AUTO: 84 %
NRBC # BLD AUTO: 0 10E3/UL
NRBC BLD AUTO-RTO: 0 /100
PLATELET # BLD AUTO: 331 10E3/UL (ref 150–450)
RBC # BLD AUTO: 2.88 10E6/UL (ref 4.4–5.9)
WBC # BLD AUTO: 11.1 10E3/UL (ref 4–11)

## 2024-06-29 PROCEDURE — 85025 COMPLETE CBC W/AUTO DIFF WBC: CPT | Mod: ORL | Performed by: REGISTERED NURSE

## 2024-07-01 ENCOUNTER — LAB REQUISITION (OUTPATIENT)
Dept: LAB | Facility: CLINIC | Age: 89
End: 2024-07-01
Payer: MEDICARE

## 2024-07-01 DIAGNOSIS — R53.1 WEAKNESS: ICD-10-CM

## 2024-07-01 LAB
ALBUMIN UR-MCNC: 300 MG/DL
APPEARANCE UR: ABNORMAL
BILIRUB UR QL STRIP: NEGATIVE
COLOR UR AUTO: ABNORMAL
GLUCOSE UR STRIP-MCNC: >=1000 MG/DL
HGB UR QL STRIP: ABNORMAL
KETONES UR STRIP-MCNC: NEGATIVE MG/DL
LEUKOCYTE ESTERASE UR QL STRIP: ABNORMAL
MUCOUS THREADS #/AREA URNS LPF: PRESENT /LPF
NITRATE UR QL: NEGATIVE
PH UR STRIP: 6.5 [PH] (ref 5–7)
RBC URINE: >182 /HPF
SP GR UR STRIP: 1.02 (ref 1–1.03)
UROBILINOGEN UR STRIP-MCNC: NORMAL MG/DL
WBC URINE: 5 /HPF

## 2024-07-01 PROCEDURE — 81001 URINALYSIS AUTO W/SCOPE: CPT | Mod: ORL | Performed by: REGISTERED NURSE

## 2024-07-10 ENCOUNTER — LAB REQUISITION (OUTPATIENT)
Dept: LAB | Facility: CLINIC | Age: 89
End: 2024-07-10
Payer: MEDICARE

## 2024-07-10 DIAGNOSIS — R31.0 GROSS HEMATURIA: ICD-10-CM

## 2024-07-10 DIAGNOSIS — E11.42 TYPE 2 DIABETES MELLITUS WITH DIABETIC POLYNEUROPATHY (H): ICD-10-CM

## 2024-07-10 DIAGNOSIS — R53.1 WEAKNESS: ICD-10-CM

## 2024-07-10 LAB
ALBUMIN UR-MCNC: 200 MG/DL
APPEARANCE UR: ABNORMAL
BACTERIA #/AREA URNS HPF: ABNORMAL /HPF
BILIRUB UR QL STRIP: NEGATIVE
COLOR UR AUTO: ABNORMAL
GLUCOSE UR STRIP-MCNC: >=1000 MG/DL
HGB BLD-MCNC: 7.1 G/DL (ref 13.3–17.7)
HGB UR QL STRIP: ABNORMAL
KETONES UR STRIP-MCNC: NEGATIVE MG/DL
LEUKOCYTE ESTERASE UR QL STRIP: ABNORMAL
MUCOUS THREADS #/AREA URNS LPF: PRESENT /LPF
NITRATE UR QL: NEGATIVE
PH UR STRIP: 5.5 [PH] (ref 5–7)
RBC URINE: 147 /HPF
SP GR UR STRIP: 1.02 (ref 1–1.03)
UROBILINOGEN UR STRIP-MCNC: NORMAL MG/DL
WBC CLUMPS #/AREA URNS HPF: PRESENT /HPF
WBC URINE: >182 /HPF

## 2024-07-10 PROCEDURE — 83036 HEMOGLOBIN GLYCOSYLATED A1C: CPT | Mod: ORL | Performed by: NURSE PRACTITIONER

## 2024-07-10 PROCEDURE — 87086 URINE CULTURE/COLONY COUNT: CPT | Mod: ORL | Performed by: NURSE PRACTITIONER

## 2024-07-10 PROCEDURE — 87186 SC STD MICRODIL/AGAR DIL: CPT | Mod: ORL | Performed by: NURSE PRACTITIONER

## 2024-07-10 PROCEDURE — 85018 HEMOGLOBIN: CPT | Mod: ORL | Performed by: NURSE PRACTITIONER

## 2024-07-10 PROCEDURE — 81001 URINALYSIS AUTO W/SCOPE: CPT | Mod: ORL | Performed by: NURSE PRACTITIONER

## 2024-07-11 LAB
BACTERIA UR CULT: ABNORMAL
HBA1C MFR BLD: 6.6 %

## 2024-07-18 ENCOUNTER — LAB REQUISITION (OUTPATIENT)
Dept: LAB | Facility: CLINIC | Age: 89
End: 2024-07-18
Payer: MEDICARE

## 2024-07-18 DIAGNOSIS — R31.0 GROSS HEMATURIA: ICD-10-CM

## 2024-07-18 LAB — HGB BLD-MCNC: 6.5 G/DL (ref 13.3–17.7)

## 2024-07-18 PROCEDURE — 85018 HEMOGLOBIN: CPT | Mod: ORL | Performed by: NURSE PRACTITIONER

## 2024-07-22 ENCOUNTER — LAB REQUISITION (OUTPATIENT)
Dept: LAB | Facility: CLINIC | Age: 89
End: 2024-07-22
Payer: MEDICARE

## 2024-07-22 DIAGNOSIS — U07.1 COVID-19: ICD-10-CM

## 2024-07-22 PROCEDURE — 87635 SARS-COV-2 COVID-19 AMP PRB: CPT | Mod: ORL | Performed by: REGISTERED NURSE

## 2024-07-23 LAB — SARS-COV-2 RNA RESP QL NAA+PROBE: NEGATIVE

## 2024-08-07 ENCOUNTER — LAB REQUISITION (OUTPATIENT)
Dept: LAB | Facility: CLINIC | Age: 89
End: 2024-08-07
Payer: OTHER MISCELLANEOUS

## 2024-08-07 DIAGNOSIS — E11.42 TYPE 2 DIABETES MELLITUS WITH DIABETIC POLYNEUROPATHY (H): ICD-10-CM

## 2024-08-07 LAB — HBA1C MFR BLD: 5.9 %

## 2024-08-07 PROCEDURE — 83036 HEMOGLOBIN GLYCOSYLATED A1C: CPT | Mod: ORL | Performed by: REGISTERED NURSE

## 2024-08-07 PROCEDURE — 85018 HEMOGLOBIN: CPT | Mod: ORL | Performed by: REGISTERED NURSE

## 2024-08-08 ENCOUNTER — LAB REQUISITION (OUTPATIENT)
Dept: LAB | Facility: CLINIC | Age: 89
End: 2024-08-08
Payer: OTHER MISCELLANEOUS

## 2024-08-08 DIAGNOSIS — R31.0 GROSS HEMATURIA: ICD-10-CM

## 2024-08-08 LAB — HGB BLD-MCNC: 9.9 G/DL (ref 13.3–17.7)

## 2024-10-11 ENCOUNTER — LAB REQUISITION (OUTPATIENT)
Dept: LAB | Facility: CLINIC | Age: 89
End: 2024-10-11
Payer: OTHER MISCELLANEOUS

## 2024-10-11 DIAGNOSIS — R50.9 FEVER, UNSPECIFIED: ICD-10-CM

## 2024-10-11 DIAGNOSIS — U07.1 COVID-19: ICD-10-CM

## 2024-10-11 LAB
C PNEUM DNA SPEC QL NAA+PROBE: NOT DETECTED
FLUAV H1 2009 PAND RNA SPEC QL NAA+PROBE: NOT DETECTED
FLUAV H1 RNA SPEC QL NAA+PROBE: NOT DETECTED
FLUAV H3 RNA SPEC QL NAA+PROBE: NOT DETECTED
FLUAV RNA SPEC QL NAA+PROBE: NOT DETECTED
FLUBV RNA SPEC QL NAA+PROBE: NOT DETECTED
HADV DNA SPEC QL NAA+PROBE: NOT DETECTED
HCOV PNL SPEC NAA+PROBE: NOT DETECTED
HMPV RNA SPEC QL NAA+PROBE: NOT DETECTED
HPIV1 RNA SPEC QL NAA+PROBE: NOT DETECTED
HPIV2 RNA SPEC QL NAA+PROBE: NOT DETECTED
HPIV3 RNA SPEC QL NAA+PROBE: NOT DETECTED
HPIV4 RNA SPEC QL NAA+PROBE: NOT DETECTED
M PNEUMO DNA SPEC QL NAA+PROBE: NOT DETECTED
RSV RNA SPEC QL NAA+PROBE: NOT DETECTED
RSV RNA SPEC QL NAA+PROBE: NOT DETECTED
RV+EV RNA SPEC QL NAA+PROBE: NOT DETECTED

## 2024-10-11 PROCEDURE — 87635 SARS-COV-2 COVID-19 AMP PRB: CPT | Mod: ORL | Performed by: REGISTERED NURSE

## 2024-10-11 PROCEDURE — 87633 RESP VIRUS 12-25 TARGETS: CPT | Mod: ORL | Performed by: REGISTERED NURSE

## 2024-10-12 LAB — SARS-COV-2 RNA RESP QL NAA+PROBE: POSITIVE

## 2024-11-05 ENCOUNTER — LAB REQUISITION (OUTPATIENT)
Dept: LAB | Facility: CLINIC | Age: 89
End: 2024-11-05
Payer: MEDICARE

## 2024-11-05 DIAGNOSIS — E11.42 TYPE 2 DIABETES MELLITUS WITH DIABETIC POLYNEUROPATHY (H): ICD-10-CM

## 2024-11-06 LAB
EST. AVERAGE GLUCOSE BLD GHB EST-MCNC: 183 MG/DL
HBA1C MFR BLD: 8 %

## 2024-11-06 PROCEDURE — 83036 HEMOGLOBIN GLYCOSYLATED A1C: CPT | Mod: ORL | Performed by: REGISTERED NURSE

## 2024-11-06 PROCEDURE — 36415 COLL VENOUS BLD VENIPUNCTURE: CPT | Mod: ORL | Performed by: REGISTERED NURSE

## 2024-11-06 PROCEDURE — P9604 ONE-WAY ALLOW PRORATED TRIP: HCPCS | Mod: ORL | Performed by: REGISTERED NURSE

## 2024-12-04 ENCOUNTER — LAB REQUISITION (OUTPATIENT)
Dept: LAB | Facility: CLINIC | Age: 89
End: 2024-12-04
Payer: MEDICARE

## 2024-12-04 DIAGNOSIS — R60.9 EDEMA, UNSPECIFIED: ICD-10-CM

## 2024-12-04 LAB
ANION GAP SERPL CALCULATED.3IONS-SCNC: 13 MMOL/L (ref 7–15)
BASOPHILS # BLD MANUAL: 0 10E3/UL (ref 0–0.2)
BASOPHILS NFR BLD MANUAL: 0 %
BUN SERPL-MCNC: 75.9 MG/DL (ref 8–23)
CALCIUM SERPL-MCNC: 8.7 MG/DL (ref 8.8–10.4)
CHLORIDE SERPL-SCNC: 96 MMOL/L (ref 98–107)
CREAT SERPL-MCNC: 1.54 MG/DL (ref 0.67–1.17)
EGFRCR SERPLBLD CKD-EPI 2021: 42 ML/MIN/1.73M2
ELLIPTOCYTES BLD QL SMEAR: SLIGHT
EOSINOPHIL # BLD MANUAL: 0 10E3/UL (ref 0–0.7)
EOSINOPHIL NFR BLD MANUAL: 0 %
ERYTHROCYTE [DISTWIDTH] IN BLOOD BY AUTOMATED COUNT: 17 % (ref 10–15)
GLUCOSE SERPL-MCNC: 415 MG/DL (ref 70–99)
HCO3 SERPL-SCNC: 21 MMOL/L (ref 22–29)
HCT VFR BLD AUTO: 21 % (ref 40–53)
HGB BLD-MCNC: 6.3 G/DL (ref 13.3–17.7)
LYMPHOCYTES # BLD MANUAL: 1.3 10E3/UL (ref 0.8–5.3)
LYMPHOCYTES NFR BLD MANUAL: 8 %
MCH RBC QN AUTO: 26.4 PG (ref 26.5–33)
MCHC RBC AUTO-ENTMCNC: 30 G/DL (ref 31.5–36.5)
MCV RBC AUTO: 88 FL (ref 78–100)
MONOCYTES # BLD MANUAL: 0.4 10E3/UL (ref 0–1.3)
MONOCYTES NFR BLD MANUAL: 3 %
MYELOCYTES # BLD MANUAL: 0.3 10E3/UL
MYELOCYTES NFR BLD MANUAL: 2 %
NEUTROPHILS # BLD MANUAL: 13.9 10E3/UL (ref 1.6–8.3)
NEUTROPHILS NFR BLD MANUAL: 88 %
PLAT MORPH BLD: ABNORMAL
PLATELET # BLD AUTO: 521 10E3/UL (ref 150–450)
POLYCHROMASIA BLD QL SMEAR: SLIGHT
POTASSIUM SERPL-SCNC: 5.6 MMOL/L (ref 3.4–5.3)
RBC # BLD AUTO: 2.39 10E6/UL (ref 4.4–5.9)
RBC MORPH BLD: ABNORMAL
SODIUM SERPL-SCNC: 130 MMOL/L (ref 135–145)
WBC # BLD AUTO: 15.8 10E3/UL (ref 4–11)

## 2024-12-04 PROCEDURE — 80048 BASIC METABOLIC PNL TOTAL CA: CPT | Mod: ORL | Performed by: REGISTERED NURSE

## 2024-12-04 PROCEDURE — 85007 BL SMEAR W/DIFF WBC COUNT: CPT | Mod: ORL | Performed by: REGISTERED NURSE

## 2024-12-04 PROCEDURE — 85027 COMPLETE CBC AUTOMATED: CPT | Mod: ORL | Performed by: REGISTERED NURSE

## 2025-01-06 ENCOUNTER — PATIENT OUTREACH (OUTPATIENT)
Dept: CARE COORDINATION | Facility: CLINIC | Age: OVER 89
End: 2025-01-06
Payer: MEDICARE

## 2025-01-20 ENCOUNTER — PATIENT OUTREACH (OUTPATIENT)
Dept: CARE COORDINATION | Facility: CLINIC | Age: OVER 89
End: 2025-01-20
Payer: MEDICARE

## 2025-02-04 ENCOUNTER — LAB REQUISITION (OUTPATIENT)
Dept: LAB | Facility: CLINIC | Age: OVER 89
End: 2025-02-04
Payer: MEDICARE

## 2025-02-04 DIAGNOSIS — E11.9 TYPE 2 DIABETES MELLITUS WITHOUT COMPLICATIONS (H): ICD-10-CM

## 2025-02-05 LAB
EST. AVERAGE GLUCOSE BLD GHB EST-MCNC: 206 MG/DL
HBA1C MFR BLD: 8.8 %

## 2025-02-05 PROCEDURE — 36415 COLL VENOUS BLD VENIPUNCTURE: CPT | Mod: ORL | Performed by: REGISTERED NURSE

## 2025-02-05 PROCEDURE — P9604 ONE-WAY ALLOW PRORATED TRIP: HCPCS | Mod: ORL | Performed by: REGISTERED NURSE

## 2025-02-05 PROCEDURE — 83036 HEMOGLOBIN GLYCOSYLATED A1C: CPT | Mod: ORL | Performed by: REGISTERED NURSE

## 2025-03-09 ENCOUNTER — HEALTH MAINTENANCE LETTER (OUTPATIENT)
Age: OVER 89
End: 2025-03-09

## 2025-08-24 ENCOUNTER — HEALTH MAINTENANCE LETTER (OUTPATIENT)
Age: OVER 89
End: 2025-08-24